# Patient Record
Sex: MALE | Race: WHITE | NOT HISPANIC OR LATINO | Employment: FULL TIME | URBAN - METROPOLITAN AREA
[De-identification: names, ages, dates, MRNs, and addresses within clinical notes are randomized per-mention and may not be internally consistent; named-entity substitution may affect disease eponyms.]

---

## 2018-04-19 ENCOUNTER — HOSPITAL ENCOUNTER (INPATIENT)
Facility: HOSPITAL | Age: 43
LOS: 1 days | Discharge: HOME | DRG: 305 | End: 2018-04-20
Attending: EMERGENCY MEDICINE | Admitting: INTERNAL MEDICINE
Payer: COMMERCIAL

## 2018-04-19 DIAGNOSIS — R80.9 PROTEINURIA, UNSPECIFIED TYPE: ICD-10-CM

## 2018-04-19 DIAGNOSIS — Z72.0 TOBACCO ABUSE: ICD-10-CM

## 2018-04-19 DIAGNOSIS — E87.6 HYPOKALEMIA: ICD-10-CM

## 2018-04-19 DIAGNOSIS — I16.1 HYPERTENSIVE EMERGENCY: Primary | ICD-10-CM

## 2018-04-19 DIAGNOSIS — I10 HYPERTENSION: ICD-10-CM

## 2018-04-19 DIAGNOSIS — N17.9 AKI (ACUTE KIDNEY INJURY): ICD-10-CM

## 2018-04-19 DIAGNOSIS — N28.1 BILATERAL RENAL CYSTS: ICD-10-CM

## 2018-04-19 DIAGNOSIS — R31.0 GROSS HEMATURIA: ICD-10-CM

## 2018-04-19 LAB
ABO + RH BLD: NORMAL
ALBUMIN SERPL BCP-MCNC: 3.9 G/DL
ALP SERPL-CCNC: 85 U/L
ALT SERPL W/O P-5'-P-CCNC: 27 U/L
AMPHET+METHAMPHET UR QL: NEGATIVE
ANION GAP SERPL CALC-SCNC: 12 MMOL/L
AST SERPL-CCNC: 20 U/L
BACTERIA #/AREA URNS HPF: NORMAL /HPF
BARBITURATES UR QL SCN>200 NG/ML: NEGATIVE
BASOPHILS # BLD AUTO: 0.09 K/UL
BASOPHILS NFR BLD: 0.6 %
BENZODIAZ UR QL SCN>200 NG/ML: NEGATIVE
BILIRUB SERPL-MCNC: 0.6 MG/DL
BILIRUB UR QL STRIP: NEGATIVE
BLD GP AB SCN CELLS X3 SERPL QL: NORMAL
BUN SERPL-MCNC: 23 MG/DL
BZE UR QL SCN: NEGATIVE
CALCIUM SERPL-MCNC: 9.7 MG/DL
CANNABINOIDS UR QL SCN: NEGATIVE
CHLORIDE SERPL-SCNC: 105 MMOL/L
CK SERPL-CCNC: 128 U/L
CLARITY UR: CLEAR
CO2 SERPL-SCNC: 24 MMOL/L
COLOR UR: YELLOW
CREAT SERPL-MCNC: 1.6 MG/DL
CREAT UR-MCNC: 158.1 MG/DL
CREAT UR-MCNC: 158.1 MG/DL
DIFFERENTIAL METHOD: ABNORMAL
EOSINOPHIL # BLD AUTO: 0.4 K/UL
EOSINOPHIL NFR BLD: 2.9 %
EOSINOPHIL URNS QL WRIGHT STN: NORMAL
ERYTHROCYTE [DISTWIDTH] IN BLOOD BY AUTOMATED COUNT: 13.9 %
EST. GFR  (AFRICAN AMERICAN): >60 ML/MIN/1.73 M^2
EST. GFR  (NON AFRICAN AMERICAN): 52 ML/MIN/1.73 M^2
ESTIMATED AVG GLUCOSE: 108 MG/DL
GLUCOSE SERPL-MCNC: 107 MG/DL
GLUCOSE UR QL STRIP: ABNORMAL
HBA1C MFR BLD HPLC: 5.4 %
HCT VFR BLD AUTO: 47.4 %
HGB BLD-MCNC: 16.3 G/DL
HGB UR QL STRIP: ABNORMAL
HYALINE CASTS #/AREA URNS LPF: 0 /LPF
INR PPP: 0.9
KETONES UR QL STRIP: NEGATIVE
LACTATE SERPL-SCNC: 1.8 MMOL/L
LEUKOCYTE ESTERASE UR QL STRIP: NEGATIVE
LYMPHOCYTES # BLD AUTO: 3.9 K/UL
LYMPHOCYTES NFR BLD: 26.7 %
MCH RBC QN AUTO: 30.5 PG
MCHC RBC AUTO-ENTMCNC: 34.4 G/DL
MCV RBC AUTO: 89 FL
METHADONE UR QL SCN>300 NG/ML: NEGATIVE
MICROALBUMIN UR DL<=1MG/L-MCNC: 1444 UG/ML
MICROALBUMIN/CREATININE RATIO: 913.3 UG/MG
MICROSCOPIC COMMENT: NORMAL
MONOCYTES # BLD AUTO: 1 K/UL
MONOCYTES NFR BLD: 6.7 %
NEUTROPHILS # BLD AUTO: 9 K/UL
NEUTROPHILS NFR BLD: 62.3 %
NITRITE UR QL STRIP: NEGATIVE
OPIATES UR QL SCN: NEGATIVE
PCP UR QL SCN>25 NG/ML: NEGATIVE
PH UR STRIP: 6 [PH] (ref 5–8)
PLATELET # BLD AUTO: 252 K/UL
PMV BLD AUTO: 12.1 FL
POTASSIUM SERPL-SCNC: 3.3 MMOL/L
PROT SERPL-MCNC: 7.6 G/DL
PROT UR QL STRIP: ABNORMAL
PROTHROMBIN TIME: 9.4 SEC
RBC # BLD AUTO: 5.34 M/UL
RBC #/AREA URNS HPF: 1 /HPF (ref 0–4)
SODIUM SERPL-SCNC: 141 MMOL/L
SODIUM UR-SCNC: 93 MMOL/L
SP GR UR STRIP: >=1.03 (ref 1–1.03)
SQUAMOUS #/AREA URNS HPF: 2 /HPF
TOXICOLOGY INFORMATION: NORMAL
TROPONIN I SERPL DL<=0.01 NG/ML-MCNC: 0.03 NG/ML
TROPONIN I SERPL DL<=0.01 NG/ML-MCNC: 0.04 NG/ML
TSH SERPL DL<=0.005 MIU/L-ACNC: 1.77 UIU/ML
URN SPEC COLLECT METH UR: ABNORMAL
UROBILINOGEN UR STRIP-ACNC: NEGATIVE EU/DL
WBC # BLD AUTO: 14.4 K/UL
WBC #/AREA URNS HPF: 3 /HPF (ref 0–5)

## 2018-04-19 PROCEDURE — 25000003 PHARM REV CODE 250: Performed by: INTERNAL MEDICINE

## 2018-04-19 PROCEDURE — 84484 ASSAY OF TROPONIN QUANT: CPT

## 2018-04-19 PROCEDURE — 87205 SMEAR GRAM STAIN: CPT

## 2018-04-19 PROCEDURE — 85025 COMPLETE CBC W/AUTO DIFF WBC: CPT

## 2018-04-19 PROCEDURE — 36415 COLL VENOUS BLD VENIPUNCTURE: CPT

## 2018-04-19 PROCEDURE — 82550 ASSAY OF CK (CPK): CPT

## 2018-04-19 PROCEDURE — 84484 ASSAY OF TROPONIN QUANT: CPT | Mod: 91

## 2018-04-19 PROCEDURE — 84300 ASSAY OF URINE SODIUM: CPT

## 2018-04-19 PROCEDURE — 83036 HEMOGLOBIN GLYCOSYLATED A1C: CPT

## 2018-04-19 PROCEDURE — 80053 COMPREHEN METABOLIC PANEL: CPT

## 2018-04-19 PROCEDURE — 84443 ASSAY THYROID STIM HORMONE: CPT

## 2018-04-19 PROCEDURE — 82043 UR ALBUMIN QUANTITATIVE: CPT

## 2018-04-19 PROCEDURE — 86901 BLOOD TYPING SEROLOGIC RH(D): CPT

## 2018-04-19 PROCEDURE — 25000003 PHARM REV CODE 250: Performed by: EMERGENCY MEDICINE

## 2018-04-19 PROCEDURE — 96366 THER/PROPH/DIAG IV INF ADDON: CPT

## 2018-04-19 PROCEDURE — 85610 PROTHROMBIN TIME: CPT

## 2018-04-19 PROCEDURE — 96361 HYDRATE IV INFUSION ADD-ON: CPT

## 2018-04-19 PROCEDURE — 81000 URINALYSIS NONAUTO W/SCOPE: CPT

## 2018-04-19 PROCEDURE — 80307 DRUG TEST PRSMV CHEM ANLYZR: CPT

## 2018-04-19 PROCEDURE — 93005 ELECTROCARDIOGRAM TRACING: CPT

## 2018-04-19 PROCEDURE — 20000000 HC ICU ROOM

## 2018-04-19 PROCEDURE — 99285 EMERGENCY DEPT VISIT HI MDM: CPT | Mod: 25

## 2018-04-19 PROCEDURE — 96365 THER/PROPH/DIAG IV INF INIT: CPT

## 2018-04-19 PROCEDURE — 93010 ELECTROCARDIOGRAM REPORT: CPT | Mod: ,,, | Performed by: INTERNAL MEDICINE

## 2018-04-19 PROCEDURE — 83605 ASSAY OF LACTIC ACID: CPT

## 2018-04-19 RX ORDER — SODIUM CHLORIDE 9 MG/ML
INJECTION, SOLUTION INTRAVENOUS CONTINUOUS
Status: ACTIVE | OUTPATIENT
Start: 2018-04-19 | End: 2018-04-19

## 2018-04-19 RX ORDER — METOPROLOL TARTRATE 50 MG/1
50 TABLET ORAL 2 TIMES DAILY
Status: DISCONTINUED | OUTPATIENT
Start: 2018-04-19 | End: 2018-04-20 | Stop reason: HOSPADM

## 2018-04-19 RX ORDER — POTASSIUM CHLORIDE 20 MEQ/1
20 TABLET, EXTENDED RELEASE ORAL ONCE
Status: COMPLETED | OUTPATIENT
Start: 2018-04-19 | End: 2018-04-19

## 2018-04-19 RX ORDER — ASPIRIN 325 MG
325 TABLET, DELAYED RELEASE (ENTERIC COATED) ORAL
Status: COMPLETED | OUTPATIENT
Start: 2018-04-19 | End: 2018-04-19

## 2018-04-19 RX ORDER — METOPROLOL TARTRATE 1 MG/ML
5 INJECTION, SOLUTION INTRAVENOUS EVERY 5 MIN PRN
Status: DISCONTINUED | OUTPATIENT
Start: 2018-04-19 | End: 2018-04-20 | Stop reason: HOSPADM

## 2018-04-19 RX ORDER — NICARDIPINE HYDROCHLORIDE 0.2 MG/ML
2.5 INJECTION INTRAVENOUS CONTINUOUS
Status: DISCONTINUED | OUTPATIENT
Start: 2018-04-19 | End: 2018-04-20

## 2018-04-19 RX ORDER — SODIUM CHLORIDE 0.9 % (FLUSH) 0.9 %
5 SYRINGE (ML) INJECTION
Status: DISCONTINUED | OUTPATIENT
Start: 2018-04-19 | End: 2018-04-20 | Stop reason: HOSPADM

## 2018-04-19 RX ADMIN — METOPROLOL TARTRATE 50 MG: 50 TABLET ORAL at 12:04

## 2018-04-19 RX ADMIN — ASPIRIN 325 MG: 325 TABLET, DELAYED RELEASE ORAL at 12:04

## 2018-04-19 RX ADMIN — SODIUM CHLORIDE: 0.9 INJECTION, SOLUTION INTRAVENOUS at 02:04

## 2018-04-19 RX ADMIN — METOPROLOL TARTRATE 50 MG: 50 TABLET ORAL at 08:04

## 2018-04-19 RX ADMIN — NICARDIPINE HYDROCHLORIDE 2.5 MG/HR: 0.2 INJECTION, SOLUTION INTRAVENOUS at 10:04

## 2018-04-19 RX ADMIN — SODIUM CHLORIDE, SODIUM LACTATE, POTASSIUM CHLORIDE, AND CALCIUM CHLORIDE 1000 ML: 600; 310; 30; 20 INJECTION, SOLUTION INTRAVENOUS at 10:04

## 2018-04-19 RX ADMIN — POTASSIUM CHLORIDE 20 MEQ: 20 TABLET, EXTENDED RELEASE ORAL at 12:04

## 2018-04-19 RX ADMIN — NICARDIPINE HYDROCHLORIDE 2.5 MG/HR: 0.2 INJECTION, SOLUTION INTRAVENOUS at 08:04

## 2018-04-19 NOTE — ED PROVIDER NOTES
Encounter Date: 4/19/2018    SCRIBE #1 NOTE: I, Melvin Hendrickson, am scribing for, and in the presence of,  Dr. Hua. I have scribed the entire note.       History     Chief Complaint   Patient presents with    Hematuria     c/o hematuria and pain across his lower back      Time seen by provider: 10:21 AM    This is a 42 y.o. male who presents with complaint of intermittent hematuria with associated lower back pain for the past 2 weeks. Patient is hypertensive at triage with a BP of 233/155. He reports that the pain does not radiate anywhere, but states it is worse on the right. He reports burning in his pelvic region, but not in his penis, during urination.  Patient also reports intermittent mild nausea, and frequency of urination. Patient denies any vomiting, fever, chills, rash, penile discomfort, penile discharge, HA, flushing, or any other concerning symptoms. Patient reports no improvement of symptoms with Azithromycin and cranberry juice. Patient has no history of kidney stone, STI's, or FHx of kidney abnormalities.      The history is provided by the patient. The history is limited by a language barrier. A  was used.     Review of patient's allergies indicates:  No Known Allergies  History reviewed. No pertinent past medical history.  History reviewed. No pertinent surgical history.  No family history on file.  Social History   Substance Use Topics    Smoking status: Current Every Day Smoker    Smokeless tobacco: Not on file    Alcohol use No     Review of Systems   Constitutional: Negative for chills and fever.   Gastrointestinal: Positive for nausea. Negative for abdominal pain and vomiting.   Genitourinary: Positive for dysuria, frequency and hematuria. Negative for discharge and penile pain.        Pelvic pain on urination   Musculoskeletal: Positive for back pain (bilateral, worse on right, nonradiating).   Skin: Negative for color change (No flushing) and rash.   Neurological:  Negative for headaches.   All other systems reviewed and are negative.      Physical Exam     Initial Vitals [04/19/18 0944]   BP Pulse Resp Temp SpO2   (!) 233/155 102 18 98 °F (36.7 °C) 97 %      MAP       181         Physical Exam    Nursing note and vitals reviewed.  Constitutional: He appears well-developed and well-nourished. He is not diaphoretic. No distress.   HENT:   Head: Normocephalic and atraumatic.   Mouth/Throat: Oropharynx is clear and moist.   Eyes: Conjunctivae and EOM are normal.   Neck: Normal range of motion. Neck supple.   Cardiovascular: Normal rate, regular rhythm and normal heart sounds. Exam reveals no gallop and no friction rub.    No murmur heard.  Pulmonary/Chest: Breath sounds normal. He has no wheezes. He has no rhonchi. He has no rales.   Abdominal: Soft. There is no tenderness.   No CVA TTP, no abdominal TTP   Genitourinary: Penis normal. No discharge found.   Genitourinary Comments: No penile discharge, no rash; circumcised   Musculoskeletal: Normal range of motion. He exhibits no edema or tenderness.   Lymphadenopathy:     He has no cervical adenopathy.   Neurological: He is alert and oriented to person, place, and time. He has normal strength.   Skin: Skin is warm and dry. No rash noted.         ED Course   Procedures  Labs Reviewed   URINALYSIS   URINALYSIS MICROSCOPIC     EKG Readings: (Independently Interpreted)   Sinus tachycardia at 113 bpm, QT interval normal, no ST elevation, RBBB          Medical Decision Making:   History:   I obtained history from: someone other than patient.       <> Summary of History: Patient's future brother-in-law  Clinical Tests:   Lab Tests: Ordered and Reviewed  Medical Tests: Ordered and Reviewed  ED Management:  11:44 AM  Paged [facility] hospitalist.    11:47 AM  Spoke with Dr. Carlos, [facility] hospitalist, who will assume care of patient. Dr. Carlos will wane patient off of Cardine drip so that patient can be admitted to floor. No further ED  imaging; Dr. Carlos will probably obtain US.    Additional MDM:   Comments: 42-year-old male with no past medical history presenting with extreme hypertension and a history of lower back pain intermittently over the past 2 weeks or so as well as intermittent hematuria.  Currently he has no pain and no complaints.  Does not endorse a history of flushing or headaches.  We will assess for end organ damage and lower his blood pressure with a titratable medication.  Patient had a small bump in troponin his tox screen is pending case discussed with Dr. Carlos he was given an aspirin prothrombin troponin.  He is to hydrated with a urine specific gravity of 10:30 and this could be contributing as a  prerenal  Insult however it is possible this could be due to his hypertension.  He needs a reassessment of his chemistry and kidney function after the fluids..                    Clinical Impression:   No diagnosis found.          Scribe attestation  I reviewed note and agree                 Anamaria Hua MD  04/19/18 3605       Anamaria Hua MD  04/22/18 8978

## 2018-04-19 NOTE — H&P
[facility] Internal Medicine History and Physical - Resident Note    Admitting Team: Medicine Team A  Attending Physician: Terrance  Resident: Vern  Interns: Gianna    Date of Admit: 4/19/2018    Chief Complaint     Hematuria and back pain x 2 weeks    Subjective:      History of Present Illness:  Mark Rivera is a 42 y.o. male who  has no past medical history on file.. The patient presented to [facility] on 4/19/2018 with a primary complaint of Hematuria (c/o hematuria and pain across his lower back )    Patient was USOH until 2 weeks ago when he began to notice blood in urine and intermittent low back pain. Pain described on right side lower back comes and goes throughout the day and will last for about 2 hours when it comes on. No radiation of the pain. Patient brought picture of bloody urine and urine is copious and turning entire toilet bowl red. Has been drinkingcranberry juice and the pain has improved but still persistent. Came to ER today because he had the day off and is concerned about kidney stones. Patient denies any headaches, dizziness, fever, lightheadedness, chest pain, SOB, changes in hearing or vision. Not currently sexually active. No discharge or rashes on penis.    Past Medical History:  History reviewed. No pertinent past medical history.    Past Surgical History:  History reviewed. No pertinent surgical history.    Allergies:  Review of patient's allergies indicates:  No Known Allergies    Home Medications:  Prior to Admission medications    Not on File       Family History:  M diabetes  F COPD    Social History:  Social History   Substance Use Topics    Smoking status: Current Every Day Smoker    Smokeless tobacco: Not on file    Alcohol use No   1 ppd smoker x 30 years. No alcohol/drugs. Works as [employer]    Review of Systems:  Pertinent positives and negatives are listed in HPI.  All other systems are reviewed and are negative.    Health Maintaince :   Primary Care  Physician: none  Immunizations:   Immunizations UTD     Objective:   Last 24 Hour Vital Signs:  BP  Min: 176/122  Max: 233/155  Temp  Av °F (36.7 °C)  Min: 98 °F (36.7 °C)  Max: 98 °F (36.7 °C)  Pulse  Av  Min: 98  Max: 118  Resp  Av.2  Min: 15  Max: 22  SpO2  Av.2 %  Min: 93 %  Max: 97 %  Height  Av' (182.9 cm)  Min: 6' (182.9 cm)  Max: 6' (182.9 cm)  Weight  Av.9 kg (174 lb)  Min: 78.9 kg (174 lb)  Max: 78.9 kg (174 lb)  Body mass index is 23.6 kg/m².  No intake/output data recorded.    Physical Examination:  General: Alert and awake in NAD  Head:  Normocephalic and atraumatic  Eyes:  PERRL; EOMi with anicteric sclera and clear conjunctivae  Mouth:  Oropharynx clear and without exudate; moist mucous membranes  Cardio:  Regular rate and rhythm with normal S1 and S2; no murmurs or rubs  Resp:  CTAB and unlabored; no wheezes, crackles or rhonchi  Back:  No CVA tenderness  Abdom: Soft, NTND with normoactive bowel sounds  Extrem: WWP with no clubbing, cyanosis or edema  Skin:  No rashes, lesions, or color changes  Pulses: 2+ and symmetric distally  Neuro:  AAOx3; cooperative and pleasant with no focal deficits    Laboratory:  Most Recent Data:  CBC: Lab Results   Component Value Date    WBC 14.40 (H) 2018    HGB 16.3 2018    HCT 47.4 2018     2018    MCV 89 2018    RDW 13.9 2018     BMP: Lab Results   Component Value Date     2018    K 3.3 (L) 2018     2018    CO2 24 2018    BUN 23 (H) 2018    CREATININE 1.6 (H) 2018     2018    CALCIUM 9.7 2018     LFTs: Lab Results   Component Value Date    PROT 7.6 2018    ALBUMIN 3.9 2018    BILITOT 0.6 2018    AST 20 2018    ALKPHOS 85 2018    ALT 27 2018     Coags:   Lab Results   Component Value Date    INR 0.9 2018     Urinalysis: Lab Results   Component Value Date    COLORU Yellow 2018     SPECGRAV >=1.030 (A) 04/19/2018    NITRITE Negative 04/19/2018    KETONESU Negative 04/19/2018    UROBILINOGEN Negative 04/19/2018    WBCUA 3 04/19/2018       Trended Lab Data:    Recent Labs  Lab 04/19/18  1043   WBC 14.40*   HGB 16.3   HCT 47.4      MCV 89   RDW 13.9      K 3.3*      CO2 24   BUN 23*   CREATININE 1.6*      PROT 7.6   ALBUMIN 3.9   BILITOT 0.6   AST 20   ALKPHOS 85   ALT 27       Trended Cardiac Data:    Recent Labs  Lab 04/19/18  1043   TROPONINI 0.038*     Radiology:  Imaging Results    None            Assessment:     HTN emergency  - initial /155. Creatinine elevated at 1.6  - likely longstanding essential HTN with no PCP  - started on nicardipine drip in ER  - starting lopressor 50, PRN IV metoprolol    Hematuria  - reported hematuria x2 weeks  - UA with 1+ occult blood. WBC 14.4  - urine labs pending. US and CT stone protocol pending  - lactate pending    DULCE  - Cr 1.6, BUN 23  - likely 2/2 stone vs HTN  - urine studies pending and imaging pending  - 1L LR given in ER, will add 1 more liter    Tobacco abuse  - 30 pack-year smoker  - will offer resources at [place]    Hypokalemia  - K 3.3  - monitor and replete PRN    HCM  - No PCP  - UTD vaccinations  - TSH A1c pending    Diet - cardiac  Ppx - OMAR hose  Dispo - pending workup    Code Status:     full    Simon Katz  [facility] Internal Medicine HO-I  [employer] Medicine Service    [facility] Medicine Hospitalist Pager numbers:   [facility] Hospitalist Medicine Team A (Jacqui/Terrance): [phone]  [facility] Hospitalist Medicine Team B (Brynn/Dyana):  [phone]

## 2018-04-19 NOTE — ED NOTES
Pt is alert and oriented x 3. Denies any pain or s/s of hypertension. No distress noted. On cardiac monitor. Friend at bedside. SR up and CB in reach.

## 2018-04-19 NOTE — NURSING
Pt arrived in stable condition. Walked from stretcher to ICU bed without difficulty. Attached monitor. Normal sinus rhythm noted, pt on Room air. No difficulty breathing. AAOx4, bilateral BS clear, active Bowel sounds, abd soft/nontender. Denies pain at this time. Pulses palpable in all extremities. Urinal provided. No complaints or questions at this time. Will continue to monitor.

## 2018-04-19 NOTE — ED NOTES
Pt transferred to ICU on cardiac monitor via stretcher and this nurse at side. Pt is alert and oriented, no distress noted. Pt watching videos on phone. ICU nurses met this nurse at bedside for transfer.

## 2018-04-19 NOTE — ED NOTES
APPEARANCE: Alert, oriented and in no acute distress.  CARDIAC: Tachycardic rate and rhythm, no murmur heard. Elevated BP  PERIPHERAL VASCULAR: peripheral pulses present. Normal cap refill. No edema. Warm to touch.    RESPIRATORY:Normal rate and effort, breath sounds clear bilaterally throughout chest. Respirations are equal and unlabored no obvious signs of distress.  GASTRO: soft, bowel sounds normal, no tenderness, no abdominal distention.  MUSC: Full ROM. No bony tenderness or soft tissue tenderness. No obvious deformity. Complains of back pain  SKIN: Skin is warm and dry, normal skin turgor, mucous membranes moist.  NEURO: 5/5 strength major flexors/extensors bilaterally. Sensory intact to light touch bilaterally. Antonia coma scale: eyes open spontaneously-4, oriented & converses-5, obeys commands-6. No neurological abnormalities.   MENTAL STATUS: awake, alert and aware of environment.  EYE: PERRL, both eyes: pupils brisk and reactive to light. Normal size.  ENT: EARS: no obvious drainage. NOSE: no active bleeding.   Pt complains of lower back pain and hematuria. BP is elevated with no hx or s/s of hypertension.

## 2018-04-19 NOTE — ED NOTES
42 year old male presented to ed complaint of back pain and hematuria ,dysuria for 2 weeks , no worzse todaty just had time to come to hospital today. Iv started and blood drawn , transfer to central monitoring / of bp 228/166 .

## 2018-04-19 NOTE — ED NOTES
Pt resting on stretcher, watching videos on his cell phone. Friend is at bedside. On cardiac monitor. No distress noted.

## 2018-04-20 VITALS
TEMPERATURE: 98 F | RESPIRATION RATE: 17 BRPM | SYSTOLIC BLOOD PRESSURE: 152 MMHG | OXYGEN SATURATION: 97 % | HEIGHT: 67 IN | HEART RATE: 74 BPM | WEIGHT: 177.5 LBS | BODY MASS INDEX: 27.86 KG/M2 | DIASTOLIC BLOOD PRESSURE: 103 MMHG

## 2018-04-20 LAB
ALBUMIN SERPL BCP-MCNC: 3.5 G/DL
ALP SERPL-CCNC: 75 U/L
ALT SERPL W/O P-5'-P-CCNC: 24 U/L
ANION GAP SERPL CALC-SCNC: 10 MMOL/L
AST SERPL-CCNC: 19 U/L
BASOPHILS # BLD AUTO: 0.08 K/UL
BASOPHILS NFR BLD: 0.5 %
BILIRUB SERPL-MCNC: 0.6 MG/DL
BUN SERPL-MCNC: 21 MG/DL
CALCIUM SERPL-MCNC: 9.1 MG/DL
CHLORIDE SERPL-SCNC: 105 MMOL/L
CO2 SERPL-SCNC: 24 MMOL/L
CREAT SERPL-MCNC: 1.4 MG/DL
CREAT UR-MCNC: 163.2 MG/DL
DIFFERENTIAL METHOD: ABNORMAL
EOSINOPHIL # BLD AUTO: 0.4 K/UL
EOSINOPHIL NFR BLD: 2.5 %
ERYTHROCYTE [DISTWIDTH] IN BLOOD BY AUTOMATED COUNT: 14 %
EST. GFR  (AFRICAN AMERICAN): >60 ML/MIN/1.73 M^2
EST. GFR  (NON AFRICAN AMERICAN): >60 ML/MIN/1.73 M^2
GLUCOSE SERPL-MCNC: 87 MG/DL
HCT VFR BLD AUTO: 45.3 %
HGB BLD-MCNC: 15.2 G/DL
LYMPHOCYTES # BLD AUTO: 5.2 K/UL
LYMPHOCYTES NFR BLD: 32.1 %
MAGNESIUM SERPL-MCNC: 2 MG/DL
MCH RBC QN AUTO: 29.9 PG
MCHC RBC AUTO-ENTMCNC: 33.6 G/DL
MCV RBC AUTO: 89 FL
MONOCYTES # BLD AUTO: 1 K/UL
MONOCYTES NFR BLD: 6.2 %
NEUTROPHILS # BLD AUTO: 9.3 K/UL
NEUTROPHILS NFR BLD: 57.5 %
PHOSPHATE SERPL-MCNC: 3.3 MG/DL
PLATELET # BLD AUTO: 266 K/UL
PMV BLD AUTO: 12.1 FL
POTASSIUM SERPL-SCNC: 3.2 MMOL/L
PROT SERPL-MCNC: 6.8 G/DL
RBC # BLD AUTO: 5.09 M/UL
SODIUM SERPL-SCNC: 139 MMOL/L
TROPONIN I SERPL DL<=0.01 NG/ML-MCNC: 0.03 NG/ML
WBC # BLD AUTO: 16.1 K/UL

## 2018-04-20 PROCEDURE — 90471 IMMUNIZATION ADMIN: CPT | Performed by: RADIOLOGY

## 2018-04-20 PROCEDURE — 85025 COMPLETE CBC W/AUTO DIFF WBC: CPT

## 2018-04-20 PROCEDURE — 90670 PCV13 VACCINE IM: CPT | Performed by: RADIOLOGY

## 2018-04-20 PROCEDURE — 3E0234Z INTRODUCTION OF SERUM, TOXOID AND VACCINE INTO MUSCLE, PERCUTANEOUS APPROACH: ICD-10-PCS | Performed by: INTERNAL MEDICINE

## 2018-04-20 PROCEDURE — 63600175 PHARM REV CODE 636 W HCPCS: Performed by: RADIOLOGY

## 2018-04-20 PROCEDURE — 84100 ASSAY OF PHOSPHORUS: CPT

## 2018-04-20 PROCEDURE — 36415 COLL VENOUS BLD VENIPUNCTURE: CPT

## 2018-04-20 PROCEDURE — 83735 ASSAY OF MAGNESIUM: CPT

## 2018-04-20 PROCEDURE — 80053 COMPREHEN METABOLIC PANEL: CPT

## 2018-04-20 PROCEDURE — 82570 ASSAY OF URINE CREATININE: CPT

## 2018-04-20 PROCEDURE — 25000003 PHARM REV CODE 250: Performed by: INTERNAL MEDICINE

## 2018-04-20 PROCEDURE — 94761 N-INVAS EAR/PLS OXIMETRY MLT: CPT

## 2018-04-20 PROCEDURE — 63600175 PHARM REV CODE 636 W HCPCS: Performed by: INTERNAL MEDICINE

## 2018-04-20 RX ORDER — LISINOPRIL 20 MG/1
20 TABLET ORAL DAILY
Qty: 90 TABLET | Refills: 3 | Status: SHIPPED | OUTPATIENT
Start: 2018-04-20 | End: 2018-04-20

## 2018-04-20 RX ORDER — LISINOPRIL 20 MG/1
40 TABLET ORAL DAILY
Qty: 180 TABLET | Refills: 3 | Status: SHIPPED | OUTPATIENT
Start: 2018-04-20 | End: 2018-07-18 | Stop reason: SDUPTHER

## 2018-04-20 RX ORDER — POTASSIUM CHLORIDE 20 MEQ/1
20 TABLET, EXTENDED RELEASE ORAL
Status: COMPLETED | OUTPATIENT
Start: 2018-04-20 | End: 2018-04-20

## 2018-04-20 RX ORDER — MAGNESIUM SULFATE 1 G/100ML
1 INJECTION INTRAVENOUS ONCE
Status: COMPLETED | OUTPATIENT
Start: 2018-04-20 | End: 2018-04-20

## 2018-04-20 RX ADMIN — MAGNESIUM SULFATE 1 G: 1 INJECTION INTRAVENOUS at 09:04

## 2018-04-20 RX ADMIN — POTASSIUM CHLORIDE 20 MEQ: 20 TABLET, EXTENDED RELEASE ORAL at 11:04

## 2018-04-20 RX ADMIN — METOPROLOL TARTRATE 50 MG: 50 TABLET ORAL at 07:04

## 2018-04-20 RX ADMIN — PNEUMOCOCCAL 13-VALENT CONJUGATE VACCINE 0.5 ML: 2.2; 2.2; 2.2; 2.2; 2.2; 4.4; 2.2; 2.2; 2.2; 2.2; 2.2; 2.2; 2.2 INJECTION, SUSPENSION INTRAMUSCULAR at 11:04

## 2018-04-20 RX ADMIN — POTASSIUM CHLORIDE 20 MEQ: 20 TABLET, EXTENDED RELEASE ORAL at 09:04

## 2018-04-20 NOTE — DISCHARGE SUMMARY
[facility] Hospitalist Medicine Discharge Summary    Primary Team: [facility] Medicine Team A  Attending Physician: Marge Carlos  Resident: Trevor Porras  Intern: Simon Katz    Date of Admit: 4/19/2018  Date of Discharge:     Discharge to: home  Condition: stable    Discharge Diagnoses     Patient Active Problem List   Diagnosis    Hypertensive emergency    DULCE (acute kidney injury)    Gross hematuria    Tobacco abuse    Hypokalemia       Consultants and Procedures     Consultants:  --    Procedures:   --    Brief History of Present Illness      Mark Rivera is a 42 y.o. male who  has no past medical history on file.. The patient presented to [facility] on 4/19/2018 with a primary complaint of Hematuria (c/o hematuria and pain across his lower back )     Patient was USOH until 2 weeks ago when he began to notice blood in urine and intermittent low back pain. Pain described on right side lower back comes and goes throughout the day and will last for about 2 hours when it comes on. No radiation of the pain. Patient brought picture of bloody urine and urine is copious and turning entire toilet bowl red. Has been drinkingcranberry juice and the pain has improved but still persistent. Came to ER today because he had the day off and is concerned about kidney stones. Patient denies any headaches, dizziness, fever, lightheadedness, chest pain, SOB, changes in hearing or vision. Not currently sexually active. No discharge or rashes on penis.    Hospital Course By Problem with Pertinent Findings     HTN emergency  - initial /155 on arrival. Creatinine elevated at 1.6  - likely longstanding essential HTN with no PCP  - started on nicardipine drip in ER. Weaned off  - started lopressor 50, continue. PRN IV metoprolol  - No SIRIA on US with doppler  - will go home with lisinopril 40 and f/u PCP     Hematuria  - reported hematuria x2 weeks  - UA with 1+ occult blood. WBC 14.4  - urine labs pending. US as  above, CT stone protocol without evidence of stone  - lactate wnl    DULCE  - Cr 1.6, BUN 23 on arrival. Cr 1.4 with normal GFR this AM.  - likely 2/2 stone vs HTN  - Fena pending, microalbumin/creatinine ratio 900  - 1L LR given in ER, additional liter of NS given on floor.     Tobacco abuse  - 30 pack-year smoker  - will offer resources at [place]     Hypokalemia  - Repleting as needed     HCM  - No PCP  - UTD vaccinations  - TSH 1.7, A1c 5.4     Discharge Medications        Medication List      START taking these medications    lisinopril 40 MG tablet  Commonly known as:  PRINIVIL,ZESTRIL  Take 1 tablet (40 mg total) by mouth once daily.           Where to Get Your Medications      You can get these medications from any pharmacy    Bring a paper prescription for each of these medications  · lisinopril 20 MG tablet         Discharge Information:   Diet:  cardiac    Physical Activity:  As tolerated    Instructions:  1. Take all medications as prescribed  2. Keep all follow-up appointments  3. Return to the hospital or call your primary care physicians if any worsening symptoms such as fever, vomiting, chest pain, shortness of breath, weakness, lightheadedness, fainting occur.      Follow-Up Appointments:  PCP 1-2 weeks    Simon Katz  [facility] Internal Medicine, -I

## 2018-04-20 NOTE — PROGRESS NOTES
Dr. Katz notified of pt bp, md and team aware ok to discharge pt with bp as below      04/20/18 1100   Vital Signs   Pulse 74   Resp 17   SpO2 97 %   BP (!) 152/103   MAP (mmHg) 123

## 2018-04-20 NOTE — PLAN OF CARE
Problem: Patient Care Overview  Goal: Plan of Care Review  Outcome: Ongoing (interventions implemented as appropriate)  Plan of care reviewed with pt in pt preferred language Yakut, via Language Line, pt educated on blood pressure, risk of high blood pressure, medications, side effects, fall risk prevention measures, and plan of care, pt verbalizes understanding. Denies any needs and denies any questions at this time. Care is ongoing will continue to monitor pt status.

## 2018-04-20 NOTE — PLAN OF CARE
Problem: Patient Care Overview  Goal: Plan of Care Review  Pt refuses language line for information in Hungarian, pt brother in law at bedside who is a pharmacist is pt preferred way of communication. Education given in the form of handouts and verbally educated pt. Pt given education on lisinopril and the side effects, hypertension, how to take your blood pressure, f/u, risk factors, and tobacco cessation. Pt states he has already gone 2 days without smoking and does not plan to start up. Pt denies any needs or questions. Brother in law states he will help pt with bp monitoring and medication at home. nad noted. Pt ambulated off unit denies any symptoms.

## 2018-04-20 NOTE — PROGRESS NOTES
Patient resting at present appears to be in no distress. He remain with his Cardene drip infusing. POC is ongoing.

## 2018-04-20 NOTE — PLAN OF CARE
Chief Complaint      Hematuria and back pain x 2 weeks    Pt and brother in law, Rosado Joselito, bedside for assessment, pt independent with ADLs, no HH/HME, lives with adult brother, friend, [place] can provide transportation on discharge    Pt speaks broken english, [place] offered ,  Pt declined, friend translated       04/20/18 1056   Discharge Assessment   Assessment Type Discharge Planning Assessment   Confirmed/corrected address and phone number on facesheet? Yes   Assessment information obtained from? Patient  (pt and brother in law Rosado Vo [phone])   Expected Length of Stay (days) 2   Communicated expected length of stay with patient/caregiver yes   Prior to hospitilization cognitive status: Alert/Oriented   Prior to hospitalization functional status: Independent   Current cognitive status: Alert/Oriented   Current Functional Status: Independent   Facility Arrived From: (home)   Lives With sibling(s)  (brother)   Able to Return to Prior Arrangements yes   Is patient able to care for self after discharge? Yes   Who are your caregiver(s) and their phone number(s)? brother in law: [facility] [phone]   Patient's perception of discharge disposition home or selfcare   Readmission Within The Last 30 Days no previous admission in last 30 days   Patient currently being followed by outpatient case management? No   Patient currently receives any other outside agency services? No   Equipment Currently Used at Home none   Do you have any problems affording any of your prescribed medications? No   Is the patient taking medications as prescribed? yes   Does the patient have transportation home? Yes   Transportation Available family or friend will provide   Dialysis Name and Scheduled days N/A   Does the patient receive services at the [facility]? No   Discharge Plan A Home   Discharge Plan B Home with family   Patient/Family In Agreement With Plan yes     Pura Hudson RN Transitional Navigator  [phone]

## 2018-04-20 NOTE — PROGRESS NOTES
"[facility] Internal Medicine Resident HO-III Progress Note    Subjective:      Afeb, VSS overnight. BP well controlled. Patient without complaints this AM. Denies headache.     Objective:   Last 24 Hour Vital Signs:  BP  Min: 130/86  Max: 233/155  Temp  Av.2 °F (36.8 °C)  Min: 98 °F (36.7 °C)  Max: 98.6 °F (37 °C)  Pulse  Av.3  Min: 69  Max: 118  Resp  Av.9  Min: 11  Max: 28  SpO2  Av.7 %  Min: 89 %  Max: 98 %  Height  Av' 9.5" (176.5 cm)  Min: 5' 7" (170.2 cm)  Max: 6' (182.9 cm)  Weight  Av.7 kg (175 lb 11.8 oz)  Min: 78.9 kg (174 lb)  Max: 80.5 kg (177 lb 7.5 oz)  I/O last 3 completed shifts:  In: -   Out: 2100 [Urine:2100]    Physical Examination:    General:          Alert and awake in NAD  Head:               Normocephalic and atraumatic  Eyes:               PERRL; EOMi with anicteric sclera and clear conjunctivae  Mouth:             Oropharynx clear and without exudate; moist mucous membranes  Cardio:             Regular rate and rhythm with normal S1 and S2; no murmurs or rubs  Resp:               CTAB and unlabored; no wheezes, crackles or rhonchi  Back:               No CVA tenderness  Abdom:            Soft, NTND with normoactive bowel sounds  Extrem:            WWP with no clubbing, cyanosis or edema  Skin:                No rashes, lesions, or color changes  Pulses:            2+ and symmetric distally  Neuro:             AAOx3; cooperative and pleasant with no focal deficits    Laboratory:    Laboratory Data Reviewed: yes  Pertinent Findings:      Recent Labs  Lab 18  1043 18  0335   WBC 14.40* 16.10*   HGB 16.3 15.2   HCT 47.4 45.3    266   MCV 89 89   INR 0.9  --           Recent Labs  Lab 18  1043 18  0335    139   K 3.3* 3.2*    105   CO2 24 24   BUN 23* 21*   CREATININE 1.6* 1.4   AST 20 19   ALT 27 24   ALBUMIN 3.9 3.5        Microbiology Data Reviewed: yes  Pertinent Findings:  Microbiology Results (last 7 days)     ** No results " found for the last 168 hours. **          Other Results:  EKG (my interpretation): None    Radiology Data Reviewed: yes  Pertinent Findings:  Imaging Results          US Renal Artery Stenosis Hyperten (xpd) (Final result)  Result time 04/19/18 14:19:24    Final result by Apple Brown MD (04/19/18 14:19:24)                 Impression:      There is no evidence renal artery stenosis.      Electronically signed by: Apple Brown MD  Date:    04/19/2018  Time:    14:19             Narrative:    EXAMINATION:  US RENAL ARTERY STENOSIS HYPERTEN (XPD)    CLINICAL HISTORY:  Hypertension/DULCE;    TECHNIQUE:  Sonographic evaluation of the kidneys was performed.    COMPARISON:  None    FINDINGS:  The right kidney measures 12.1 cm in length.  There is no hydronephrosis.  There is a 1.5 cm cyst seen within the superior pole.  Resistive indices within a cortical artery are 0.4.  The highest peak systolic velocity along the main renal artery is 65 centimeters/second.    The left kidney measures 10 cm in length with resistive index within the parenchymal artery of 0.5.  The highest velocity along the left main renal artery is 50 centimeters/second.  Cysts are seen within the left kidney.    The aortic peak systolic velocity is 50 centimeters/second.                               CT Renal Stone Study Abd Pelvis WO (Final result)  Result time 04/19/18 12:53:51    Final result by Niecy Keita MD (04/19/18 12:53:51)                 Impression:      No evidence of the forming or obstructive renal stone.      Electronically signed by: Niecy Keita MD  Date:    04/19/2018  Time:    12:53             Narrative:    EXAMINATION:  CT RENAL STONE STUDY ABD PELVIS WO    CLINICAL HISTORY:  Flank pain, stone disease suspected;    TECHNIQUE:  Low dose axial images, sagittal and coronal reformations were obtained from the lung bases to the pubic symphysis.  Contrast was not administered.    COMPARISON:  None    FINDINGS:  The  lung bases are clear.  No liver mass.  The gallbladder is present.  The stomach, pancreas, spleen, adrenal glands and bilateral kidneys appear normal.  There is no forming renal Stones.  No stone seen within the bilateral ureters or within the bladder.  The aorta tapers normally, no para-aortic lymphadenopathy.  Small amount of retained stool, no inflammatory changes of the bowel seen.  The appendix is normal.  The bladder, prostate and seminal vesicles appear within normal limits.  Small left fat containing inguinal hernia.  The osseous structures appear normal.                                Current Medications:   Infusions:          Scheduled:   metoprolol tartrate  50 mg Oral BID         PRN:  influenza, metoprolol, sodium chloride 0.9%    Antibiotics and Day Number of Therapy:  Antibiotics     None          Lines and Day Number of Therapy:    Assessment:     Mark Rivera is a 42 y.o.male with  Patient Active Problem List    Diagnosis Date Noted    Hypertensive emergency 04/19/2018    DULCE (acute kidney injury) 04/19/2018    Gross hematuria 04/19/2018    Tobacco abuse 04/19/2018    Hypokalemia 04/19/2018        Plan:     HTN emergency  - initial /155 on arrival. Creatinine elevated at 1.6  - likely longstanding essential HTN with no PCP  - started on nicardipine drip in ER. Weaned off  - started lopressor 50, continue. PRN IV metoprolol  - No SIRIA on US with doppler     Hematuria  - reported hematuria x2 weeks  - UA with 1+ occult blood. WBC 14.4  - urine labs pending. US as above, CT stone protocol without evidence of stone  - lactate wnl    DULCE  - Cr 1.6, BUN 23 on arrival. Cr 1.4 with normal GFR this AM.  - likely 2/2 stone vs HTN  - Fena pending, microalbumin/creatinine ratio 900  - 1L LR given in ER, additional liter of NS given on floor.     Tobacco abuse  - 30 pack-year smoker  - will offer resources at [place]     Hypokalemia  - Repleting as needed     HCM  - No PCP  - UTD vaccinations  - TSH 1.7, A1c  5.4      Diet - cardiac  Ppx - OMAR hose  Dispo - Likely discharge today    Trevor Porras MD  [facility] Internal Medicine HO-III  [facility] Internal Medicine Service Team A    [facility] Medicine Hospitalist Pager numbers:   [facility] Hospitalist Medicine Team A (Jacqui/Terrance): [phone]  [facility] Hospitalist Medicine Team B (Brynn/Dyana):  [phone]

## 2018-04-20 NOTE — PROGRESS NOTES
Patient have 2 visitors here to bring him some food and inquire as to when the doctor will be in tomorrow. Patient brother-in-law phone number was placed into [employer]. He request the doctor call him, secondary to language barrier that the patient have currently.

## 2018-04-20 NOTE — PLAN OF CARE
Discharge orders noted, no HH or HME ordered.    Pt's nurse will go over medications/signs and symptoms prior to discharge    TN met with pt bedside, no discharge needs at this time       04/20/18 1114   Final Note   Assessment Type Final Discharge Note   Discharge Disposition Home   What phone number can be called within the next 1-3 days to see how you are doing after discharge? [phone]   Hospital Follow Up  Appt(s) scheduled? No  (pt to schedule own f/u appt)   Right Care Referral Info   Post Acute Recommendation No Care     Pura Hudson, RN Transitional Navigator  [phone]

## 2018-04-20 NOTE — PROGRESS NOTES
Patient family bought him some fruits and he is finished eating it, for now. I offered to place it in the fridge with his patient label on it, he agreed.

## 2018-04-20 NOTE — PROGRESS NOTES
Bedside report given to oncoming nurse. PIV flushed again with saline, patent. IV site switched to right hand. Patient asked if he would be willing to stay, if the doctors do not order him d/c. He did not give a clear answer, he warrant further assessment. Patient was educated on the severity of high blood pressure, he just moaned, not using clear words. Ongoing care.

## 2018-04-20 NOTE — DISCHARGE INSTRUCTIONS
BLOOD PRESSURE, DISCHARGE INSTRUCTIONS: TAKING YOUR (ENGLISH)   Smoke Free, Benefits of Living (English)   Smoking Withdrawal, Coping with (English)  Smoking Cessation (English)   Heart-Healthy Food, Eating: Using the DASH Plan (English)  High Blood Pressure (Hypertension), Discharge Instructions (English)   Hypertension, New (Begin Treatment) (English)   Lisinopril tablets (English)

## 2018-04-20 NOTE — PROGRESS NOTES
Report was received from off going nurse, patient lying in bed resting  With phone in his hand. Patient is AAOX4 he speak English with some barrier. Noted he is currently on a Cardene drip at rate control. POC discussed with patient verbalized understanding.

## 2018-04-22 PROBLEM — R80.9 PROTEINURIA: Status: ACTIVE | Noted: 2018-04-22

## 2018-04-23 ENCOUNTER — PATIENT OUTREACH (OUTPATIENT)
Dept: ADMINISTRATIVE | Facility: CLINIC | Age: 43
End: 2018-04-23

## 2018-04-23 NOTE — PATIENT INSTRUCTIONS
"  Discharge Instructions for High Blood Pressure (Hypertension)  You have been diagnosed with high blood pressure (also called hypertension). This means the force of blood against your artery walls is too strong. It also means your heart is working hard to move blood. High blood pressure usually has no symptoms, but over time, it can damage your heart, blood vessels, eyes, kidneys, and other organs. With help from your doctor, you can manage your blood pressure and protect your health.  Taking medicine  · Learn to take your own blood pressure. Keep a record of your results. Ask your doctor which readings mean that you need medical attention.  · Take your blood pressure medicine exactly as directed. Dont skip doses. Missing doses can cause your blood pressure to get out of control.  · If you do miss a dose (or doses) check with your healthcare provider about what to do.  · Avoid medicine that contain heart stimulants, including over-the-counter drugs. Check for warnings about high blood pressure on the label. Ask the pharmacist before purchasing something you haven't used before  · Check with your doctor or pharmacist before taking a decongestant. Some decongestants can worsen high blood pressure.  Lifestyle changes  · Maintain a healthy weight. Get help to lose any extra pounds.  · Cut back on salt.  ¨ Limit canned, dried, packaged, and fast foods.  ¨ Dont add salt to your food at the table.  ¨ Season foods with herbs instead of salt when you cook.  ¨ Request no added salt when you go to a restaurant.  ¨ The American Heart Associations (AHA) "ideal" sodium intake recommendation is 1,500 milligrams per day.  However, since American's eat so much salt, the AHA says a positive change can occur by cutting back to even 2,400 milligrams of sodium a day.   · Follow the DASH (Dietary Approaches to Stop Hypertension) eating plan. This plan recommends vegetables, fruits, whole gains, and other heart healthy foods.  · Begin " an exercise program. Ask your doctor how to get started. The American Heart Association recommends aerobic exercise 3 to 4 times a week for an average of 40 minutes at a time, with your doctor's approval. Simple activities like walking or gardening can help.  · Break the smoking habit. Enroll in a stop-smoking program to improve your chances of success. Ask your healthcare provider about programs and medicines to help you stop smoking.  · Limit drinks that contain caffeine (coffee, black or green tea, cola) to 2 per day.  · Never take stimulants such as amphetamines or cocaine; these drugs can be deadly for someone with high blood pressure.  · Control your stress. Learn stress-management techniques.  · Limit alcohol to no more than 1 drink a day for women and 2 drinks a day for men.  Follow-up care  Make a follow-up appointment as directed by our staff.     When to seek medical care  Call your doctor immediately or seek emergency care if you have any of the following:  · Chest pain or shortness of breath (call 911)  · Moderate to severe headache  · Weakness in the muscles of your face, arms, or legs  · Trouble speaking  · Extreme drowsiness  · Confusion  · Fainting or dizziness  · Pulsating or rushing sound in your ears  · Unexplained nosebleed  · Weakness, tingling, or numbness of your face, arms, or legs  · Change in vision  · Blood pressure measured at home that is greater than 180/110   Date Last Reviewed: 4/27/2016  © 5154-5824 [employer]. [address] [place] [address], [place] [address]. All rights reserved. This information is not intended as a substitute for professional medical care. Always follow your healthcare professional's instructions.

## 2018-07-18 ENCOUNTER — OFFICE VISIT (OUTPATIENT)
Dept: FAMILY MEDICINE | Facility: CLINIC | Age: 43
End: 2018-07-18
Payer: COMMERCIAL

## 2018-07-18 VITALS
TEMPERATURE: 98 F | SYSTOLIC BLOOD PRESSURE: 220 MMHG | WEIGHT: 193.56 LBS | RESPIRATION RATE: 16 BRPM | BODY MASS INDEX: 30.38 KG/M2 | HEART RATE: 94 BPM | HEIGHT: 67 IN | DIASTOLIC BLOOD PRESSURE: 160 MMHG | OXYGEN SATURATION: 95 %

## 2018-07-18 DIAGNOSIS — I16.0 HYPERTENSIVE URGENCY: Primary | ICD-10-CM

## 2018-07-18 DIAGNOSIS — R31.0 GROSS HEMATURIA: ICD-10-CM

## 2018-07-18 DIAGNOSIS — N17.9 AKI (ACUTE KIDNEY INJURY): ICD-10-CM

## 2018-07-18 PROBLEM — F17.211 CIGARETTE NICOTINE DEPENDENCE IN REMISSION: Status: ACTIVE | Noted: 2018-04-19

## 2018-07-18 PROBLEM — I16.1 HYPERTENSIVE EMERGENCY: Status: RESOLVED | Noted: 2018-04-19 | Resolved: 2018-07-18

## 2018-07-18 LAB
BACTERIA #/AREA URNS AUTO: NORMAL /HPF
BILIRUB UR QL STRIP: NEGATIVE
CLARITY UR REFRACT.AUTO: ABNORMAL
COLOR UR AUTO: YELLOW
CREAT UR-MCNC: 98 MG/DL
GLUCOSE UR QL STRIP: ABNORMAL
HGB UR QL STRIP: ABNORMAL
HYALINE CASTS UR QL AUTO: 0 /LPF
KETONES UR QL STRIP: NEGATIVE
LEUKOCYTE ESTERASE UR QL STRIP: NEGATIVE
MICROALBUMIN UR DL<=1MG/L-MCNC: 324 UG/ML
MICROALBUMIN/CREATININE RATIO: 330.6 UG/MG
MICROSCOPIC COMMENT: NORMAL
NITRITE UR QL STRIP: NEGATIVE
PH UR STRIP: 5 [PH] (ref 5–8)
PROT UR QL STRIP: ABNORMAL
RBC #/AREA URNS AUTO: 1 /HPF (ref 0–4)
SP GR UR STRIP: 1.01 (ref 1–1.03)
URATE CRY UR QL COMP ASSIST: NORMAL
URN SPEC COLLECT METH UR: ABNORMAL
UROBILINOGEN UR STRIP-ACNC: NEGATIVE EU/DL
WBC #/AREA URNS AUTO: 0 /HPF (ref 0–5)

## 2018-07-18 PROCEDURE — 3080F DIAST BP >= 90 MM HG: CPT | Mod: CPTII,S$GLB,, | Performed by: FAMILY MEDICINE

## 2018-07-18 PROCEDURE — 82043 UR ALBUMIN QUANTITATIVE: CPT

## 2018-07-18 PROCEDURE — 99205 OFFICE O/P NEW HI 60 MIN: CPT | Mod: S$GLB,,, | Performed by: FAMILY MEDICINE

## 2018-07-18 PROCEDURE — 3077F SYST BP >= 140 MM HG: CPT | Mod: CPTII,S$GLB,, | Performed by: FAMILY MEDICINE

## 2018-07-18 PROCEDURE — 93010 ELECTROCARDIOGRAM REPORT: CPT | Mod: S$GLB,,, | Performed by: INTERNAL MEDICINE

## 2018-07-18 PROCEDURE — 3008F BODY MASS INDEX DOCD: CPT | Mod: CPTII,S$GLB,, | Performed by: FAMILY MEDICINE

## 2018-07-18 PROCEDURE — 81001 URINALYSIS AUTO W/SCOPE: CPT

## 2018-07-18 PROCEDURE — 93005 ELECTROCARDIOGRAM TRACING: CPT | Mod: S$GLB,,, | Performed by: FAMILY MEDICINE

## 2018-07-18 PROCEDURE — 99999 PR PBB SHADOW E&M-EST. PATIENT-LVL III: CPT | Mod: PBBFAC,,, | Performed by: FAMILY MEDICINE

## 2018-07-18 RX ORDER — LISINOPRIL 40 MG/1
40 TABLET ORAL DAILY
Qty: 90 TABLET | Refills: 3 | Status: SHIPPED | OUTPATIENT
Start: 2018-07-18 | End: 2018-08-09

## 2018-07-18 RX ORDER — HYDROCHLOROTHIAZIDE 25 MG/1
25 TABLET ORAL DAILY
Qty: 90 TABLET | Refills: 3 | Status: SHIPPED | OUTPATIENT
Start: 2018-07-18 | End: 2018-07-19

## 2018-07-18 NOTE — PROGRESS NOTES
Subjective:       Patient ID: Mark Rivera is a 42 y.o. male.    Chief Complaint: Hospital Follow Up (4/19/18 htn est care )    HPI Interpretation by friend provided and preferred by patient.     Pt was treated for htn emergency and was discharged to follow up and establish with a pcp. Due to pts insurance, he had difficulty finding a covered provider per report.     Pt states that he is adherent with lisinopril.     Nicotine dependence - quit smoking x 3 months! Rare alcohol use. Rare nsaids use.        No outpatient prescriptions have been marked as taking for the 7/18/18 encounter (Office Visit) with Ronald Pearson MD.       Past Medical History:   Diagnosis Date    Hypertension        Family History   Problem Relation Age of Onset    Diabetes Mother     Hypertension Father         reports that he quit smoking about 3 months ago. He has a 25.00 pack-year smoking history. He has never used smokeless tobacco. He reports that he drinks alcohol. He reports that he does not use drugs.    Review of Systems   Constitutional: Negative for chills and fever.   HENT: Negative for ear pain and rhinorrhea.    Eyes: Negative for discharge and visual disturbance.   Respiratory: Negative for shortness of breath and wheezing.    Cardiovascular: Negative for chest pain and palpitations.   Gastrointestinal: Negative for diarrhea and vomiting.   Genitourinary: Negative for dysuria and hematuria.   Skin: Negative for rash and wound.   Neurological: Negative for syncope and weakness.   Psychiatric/Behavioral: Negative for dysphoric mood. The patient is not nervous/anxious.        Objective:     Vitals:    07/18/18 1012   BP: (!) 220/160   Pulse: 94   Resp: 16   Temp: 98.2 °F (36.8 °C)        Physical Exam   Constitutional: He appears well-developed. No distress.   HENT:   Head: Normocephalic and atraumatic.   Eyes: Conjunctivae and EOM are normal. No scleral icterus.   Cardiovascular: Normal rate and regular rhythm.  Exam  reveals no gallop and no friction rub.    No murmur heard.  Pulmonary/Chest: Effort normal and breath sounds normal. No respiratory distress. He has no wheezes. He has no rales. He exhibits no tenderness.   Musculoskeletal: He exhibits no edema.   No gross extremity deformity   Neurological: He is alert.   Psychiatric: He has a normal mood and affect. His behavior is normal.   Nursing note and vitals reviewed.      Assessment:       1. Hypertensive urgency    2. DULCE (acute kidney injury)    3. Gross hematuria        Plan:       Mark was seen today for hospital follow up.    Diagnoses and all orders for this visit:    Hypertensive urgency  -     hydroCHLOROthiazide (HYDRODIURIL) 25 MG tablet; Take 1 tablet (25 mg total) by mouth once daily.  -     lisinopril (PRINIVIL,ZESTRIL) 40 MG tablet; Take 1 tablet (40 mg total) by mouth once daily.  -     Comprehensive metabolic panel; Future  -     Microalbumin/creatinine urine ratio  -     Metanephrines, urine Random; Future  -     Cortisol, 8AM; Future  -     ANTIDIURETIC HORMONE; Future  This is a high risk patient encounter with a patient who was lost to follow up after his hospital admission to the ICU for malignant hypertension.     Pt is aware that if he develops ANY sxs such as headache, chest pain, dizziness, vomiting, or any other concerning sxs that he is to report to the ED immediately.     Plan we will increase patient's medications today from 20 mg twice a day to once a day in order to help with adherence because he was forgetting the evening dose and will have also hydrochlorothiazide 25 mg. Patient will return in 2 days for blood pressure check. Complicating matters, as the patient is leaving the country for [place] for 2 weeks and will not return after this weekend until August 8. He will follow with me the week he returns. Patient is aware that I advise the avoidance of high risk activities such as heavy alcohol intake, smoking, or NSAID use.    I will also  check again for primary causes of hypertension. I reviewed some of the records from the hospitalization. I will not reorder ultrasound for renal artery stenosis, status post done while he was admitted. I do feel that these other labs warrant a repeat to confirm that they are within a normal range.     I feel that it is in the patient's best interest to try outpatient management of his hypertension. He denies any symptoms whatsoever at this time. In order  To remain sensitive to his cost of care, we will attempt outpatient treatment. Pt agrees with this plan of action.     EKG as read by me is NSR @ ~75 bpm, nl axis, IRBBB, mild nonspecific st- t wave abnormalities, no acute MI.      DULCE (acute kidney injury)  Will check labs today to confirm recovery.     Gross hematuria  -     Urinalysis            Follow-up in about 1 week (around 7/25/2018) for Hypertension.        Pt verbalized understanding and agreed with our plan.

## 2018-07-19 ENCOUNTER — LAB VISIT (OUTPATIENT)
Dept: LAB | Facility: HOSPITAL | Age: 43
End: 2018-07-19
Attending: FAMILY MEDICINE
Payer: COMMERCIAL

## 2018-07-19 ENCOUNTER — TELEPHONE (OUTPATIENT)
Dept: FAMILY MEDICINE | Facility: CLINIC | Age: 43
End: 2018-07-19

## 2018-07-19 ENCOUNTER — HOSPITAL ENCOUNTER (EMERGENCY)
Facility: HOSPITAL | Age: 43
Discharge: HOME | End: 2018-07-19
Attending: EMERGENCY MEDICINE
Payer: COMMERCIAL

## 2018-07-19 VITALS
DIASTOLIC BLOOD PRESSURE: 128 MMHG | WEIGHT: 190 LBS | HEIGHT: 67 IN | SYSTOLIC BLOOD PRESSURE: 190 MMHG | OXYGEN SATURATION: 100 % | BODY MASS INDEX: 29.82 KG/M2 | HEART RATE: 92 BPM | RESPIRATION RATE: 18 BRPM | TEMPERATURE: 98 F

## 2018-07-19 DIAGNOSIS — I16.0 HYPERTENSIVE URGENCY: ICD-10-CM

## 2018-07-19 DIAGNOSIS — I10 ASYMPTOMATIC HYPERTENSION: Primary | ICD-10-CM

## 2018-07-19 LAB
ALBUMIN SERPL BCP-MCNC: 4.3 G/DL
ALP SERPL-CCNC: 67 U/L
ALT SERPL W/O P-5'-P-CCNC: 119 U/L
ANION GAP SERPL CALC-SCNC: 9 MMOL/L
AST SERPL-CCNC: 59 U/L
BILIRUB SERPL-MCNC: 1.5 MG/DL
BUN SERPL-MCNC: 18 MG/DL
CALCIUM SERPL-MCNC: 10 MG/DL
CHLORIDE SERPL-SCNC: 99 MMOL/L
CO2 SERPL-SCNC: 30 MMOL/L
CORTIS SERPL-MCNC: 16.7 UG/DL
CREAT SERPL-MCNC: 1.7 MG/DL
EST. GFR  (AFRICAN AMERICAN): 55.9 ML/MIN/1.73 M^2
EST. GFR  (NON AFRICAN AMERICAN): 48.3 ML/MIN/1.73 M^2
GLUCOSE SERPL-MCNC: 142 MG/DL
POTASSIUM SERPL-SCNC: 3.8 MMOL/L
PROT SERPL-MCNC: 7.9 G/DL
SODIUM SERPL-SCNC: 138 MMOL/L

## 2018-07-19 PROCEDURE — 25000003 PHARM REV CODE 250: Performed by: EMERGENCY MEDICINE

## 2018-07-19 PROCEDURE — 99283 EMERGENCY DEPT VISIT LOW MDM: CPT

## 2018-07-19 PROCEDURE — 84588 ASSAY OF VASOPRESSIN: CPT

## 2018-07-19 PROCEDURE — 83835 ASSAY OF METANEPHRINES: CPT

## 2018-07-19 PROCEDURE — 80053 COMPREHEN METABOLIC PANEL: CPT

## 2018-07-19 PROCEDURE — 82533 TOTAL CORTISOL: CPT

## 2018-07-19 PROCEDURE — 36415 COLL VENOUS BLD VENIPUNCTURE: CPT | Mod: PO

## 2018-07-19 RX ORDER — AMLODIPINE BESYLATE 10 MG/1
10 TABLET ORAL DAILY
Qty: 30 TABLET | Refills: 0 | Status: SHIPPED | OUTPATIENT
Start: 2018-07-19 | End: 2018-08-09

## 2018-07-19 RX ORDER — HYDROCHLOROTHIAZIDE 25 MG/1
25 TABLET ORAL 2 TIMES DAILY
Qty: 30 TABLET | Refills: 0 | Status: SHIPPED | OUTPATIENT
Start: 2018-07-19 | End: 2018-07-19

## 2018-07-19 RX ORDER — AMLODIPINE BESYLATE 5 MG/1
10 TABLET ORAL
Status: COMPLETED | OUTPATIENT
Start: 2018-07-19 | End: 2018-07-19

## 2018-07-19 RX ORDER — AMLODIPINE BESYLATE 5 MG/1
5 TABLET ORAL
Status: DISCONTINUED | OUTPATIENT
Start: 2018-07-19 | End: 2018-07-19

## 2018-07-19 RX ORDER — HYDROCHLOROTHIAZIDE 25 MG/1
25 TABLET ORAL 2 TIMES DAILY
Qty: 30 TABLET | Refills: 0 | Status: SHIPPED | OUTPATIENT
Start: 2018-07-19 | End: 2018-08-09

## 2018-07-19 RX ORDER — HYDROCHLOROTHIAZIDE 25 MG/1
25 TABLET ORAL
Status: COMPLETED | OUTPATIENT
Start: 2018-07-19 | End: 2018-07-19

## 2018-07-19 RX ORDER — AMLODIPINE BESYLATE 10 MG/1
10 TABLET ORAL DAILY
Qty: 30 TABLET | Refills: 0 | Status: SHIPPED | OUTPATIENT
Start: 2018-07-19 | End: 2018-07-19

## 2018-07-19 RX ORDER — HYDROCHLOROTHIAZIDE 25 MG/1
50 TABLET ORAL
Status: DISCONTINUED | OUTPATIENT
Start: 2018-07-19 | End: 2018-07-19

## 2018-07-19 RX ADMIN — AMLODIPINE BESYLATE 10 MG: 5 TABLET ORAL at 07:07

## 2018-07-19 RX ADMIN — HYDROCHLOROTHIAZIDE 25 MG: 25 TABLET ORAL at 08:07

## 2018-07-19 NOTE — TELEPHONE ENCOUNTER
PCP would like to call patient to follow up on recent BP taken at home. Pt came to clinic this morning for labs. Spoke to patient stated will call back with BP level. Pt did took medication this morning. Denied of any sx. PCP notified.

## 2018-07-19 NOTE — TELEPHONE ENCOUNTER
----- Message from Ronald Pearson MD sent at 7/19/2018  3:53 PM CDT -----  Also, inform patient that his liver function is not normal either. Please ask him to avoid any alcohol intake until we can establish the cause of this! His kidneys are stressed as mentioned before, please make sure that he is drinking plenty of water.

## 2018-07-19 NOTE — TELEPHONE ENCOUNTER
----- Message from Ronald Pearson MD sent at 7/19/2018  3:50 PM CDT -----  Please notify patient results are abnormal and show sings of kidney damage. Nothing emergent needs to be done, but we need to get his BP down ASAP.  Please have the patient follow up with me when he gets back from [place].  Thanks.

## 2018-07-19 NOTE — TELEPHONE ENCOUNTER
Patient stated recent BP was 204/141. Notified pt due to recent abnormal labs of kidney and liver it is best for patient to report to ED due to recent elevated blood pressure per Dr Pearson. Note can go to any ER with [facility] in case facility need to see pt records and labs.  Pt stated not able to go to emergency room today due to having to go to work tomorrow and will not have anyone to work in place for him if he go to ER. Notified patient again it is best for patient to go to ER before going out of country. Strongly notified pt he may not be able to work anymore or go out of country if patient do not take care of himself if he do not report to ER. Notified patient to call work facility prior to going to ER and note again of precaution. Pt verbalized understanding. Pt stated ok regarding request.

## 2018-07-20 ENCOUNTER — CLINICAL SUPPORT (OUTPATIENT)
Dept: FAMILY MEDICINE | Facility: CLINIC | Age: 43
End: 2018-07-20
Payer: COMMERCIAL

## 2018-07-20 VITALS — SYSTOLIC BLOOD PRESSURE: 154 MMHG | HEART RATE: 93 BPM | DIASTOLIC BLOOD PRESSURE: 120 MMHG

## 2018-07-20 DIAGNOSIS — I10 HYPERTENSION, UNSPECIFIED TYPE: Primary | ICD-10-CM

## 2018-07-20 PROCEDURE — 99499 UNLISTED E&M SERVICE: CPT | Mod: S$GLB,,, | Performed by: FAMILY MEDICINE

## 2018-07-20 PROCEDURE — 99999 PR PBB SHADOW E&M-EST. PATIENT-LVL I: CPT | Mod: PBBFAC,,,

## 2018-07-20 NOTE — ED NOTES
To ER with c/o high blood pressure.  Pt placed on medication yesterday for same c/o.  Denies headache, blurred vision, chest pain or SOB.  Awake and alert, oriented x 4.  resp even and unlabored.  Lungs clear.  abd soft and non tender.  + BS noted.  No peripheral edema noted.  Dr. Duron at the bedside.

## 2018-07-20 NOTE — ED NOTES
Pt continues to deny headache, vision changes, chest pain or SOB.  Discharge instructions given and explained.  Prescriptions and medications discussed.  Follow up care discussed.

## 2018-07-20 NOTE — PROGRESS NOTES
Karen  Mark Rivera 42 y.o. male is here today for Blood Pressure check.   History of HTN yes.    Review of patient's allergies indicates:  No Known Allergies  Creatinine   Date Value Ref Range Status   07/19/2018 1.7 (H) 0.5 - 1.4 mg/dL Final     Sodium   Date Value Ref Range Status   07/19/2018 138 136 - 145 mmol/L Final     Potassium   Date Value Ref Range Status   07/19/2018 3.8 3.5 - 5.1 mmol/L Final   ]  Patient verifies taking blood pressure medications on a regular basis at the same time of the day.     Current Outpatient Prescriptions:     amLODIPine (NORVASC) 10 MG tablet, Take 1 tablet (10 mg total) by mouth once daily., Disp: 30 tablet, Rfl: 0    hydroCHLOROthiazide (HYDRODIURIL) 25 MG tablet, Take 1 tablet (25 mg total) by mouth 2 (two) times daily., Disp: 30 tablet, Rfl: 0    lisinopril (PRINIVIL,ZESTRIL) 40 MG tablet, Take 1 tablet (40 mg total) by mouth once daily., Disp: 90 tablet, Rfl: 3  No current facility-administered medications for this visit.   Does patient have record of home blood pressure readings no. Readings have been averaging N/A.   Last dose of blood pressure medication was taken at 7/20/2018.  Patient is asymptomatic.   Complains of nothing at this time.    BP: (!) 152/122 ,   .    Blood pressure reading after 15 minutes was 154/120, Pulse 93.  Dr. Pearson notified.   Patient advised per  to continue medication regimen with instructions to take lisinopril and hydrochlorothiazide in the morning and amlodipine at night. Patient verbalized understanding. Follow up appointment scheduled at last visit.

## 2018-07-20 NOTE — ED PROVIDER NOTES
Encounter Date: 7/19/2018    SCRIBE #1 NOTE: I, Yonas Maria, am scribing for, and in the presence of, Dr. Duron.       History     Chief Complaint   Patient presents with    Hypertension     c/o went to MD--elevated BP. [facility]. asymptomatic.lisinopril dose doubled yesterday and hydrochlorothiazide was started new yesterday.     Mark Rivera is a 42 y.o. male who  has a past medical history of Hypertension.    Patient presents to the ED c/o elevated BP.  He was just seen by PCP yesterday and had medications changed.  He is on lisinopril 40 mg daily, HCTZ 25 daily.  Reports that he is compliant.  This morning measured in 200's, later 170's. HE is completely asymptomatic.  Denies HA, CP, SOB, N/V, vision changes. Labs performed yesterday and reveal Friend acted as  from [place].            Review of patient's allergies indicates:  No Known Allergies  Past Medical History:   Diagnosis Date    Hypertension      No past surgical history on file.  Family History   Problem Relation Age of Onset    Diabetes Mother     Hypertension Father      Social History   Substance Use Topics    Smoking status: Former Smoker     Packs/day: 1.00     Years: 25.00     Quit date: 4/18/2018    Smokeless tobacco: Never Used    Alcohol use Yes      Comment: socially     Review of Systems   Constitutional: Negative for fever.   HENT: Negative for sore throat.    Respiratory: Negative for shortness of breath.    Cardiovascular: Negative for chest pain.        Asymptomatic HTN   Gastrointestinal: Negative for nausea.   Genitourinary: Negative for dysuria.   Musculoskeletal: Negative for back pain.   Skin: Negative for rash.   Neurological: Negative for weakness.   Hematological: Does not bruise/bleed easily.       Physical Exam     Initial Vitals [07/19/18 1759]   BP Pulse Resp Temp SpO2   (!) 195/127 106 18 98.4 °F (36.9 °C) 95 %      MAP       --         Physical Exam    Nursing note and vitals  reviewed.  Constitutional: He appears well-developed and well-nourished. He is not diaphoretic. No distress.   HENT:   Head: Normocephalic and atraumatic.   Right Ear: External ear normal.   Left Ear: External ear normal.   Nose: Nose normal.   Eyes: Conjunctivae and EOM are normal. Right eye exhibits no discharge. Left eye exhibits no discharge. No scleral icterus.   Neck: Normal range of motion. Neck supple.   Cardiovascular: Normal rate, regular rhythm, normal heart sounds and intact distal pulses. Exam reveals no gallop and no friction rub.    No murmur heard.  Pulmonary/Chest: Breath sounds normal. No respiratory distress. He has no wheezes. He has no rhonchi. He has no rales.   Abdominal: Soft. He exhibits no distension. There is no tenderness. There is no rebound.   Musculoskeletal: Normal range of motion.   Neurological: He is alert and oriented to person, place, and time.   Skin: Skin is warm and dry. Capillary refill takes less than 2 seconds.   Psychiatric: He has a normal mood and affect.         ED Course   Procedures  Labs Reviewed - No data to display       Imaging Results    None          Medical Decision Making:   Initial Assessment:   Patient here with asymptomatic HTN  Differential Diagnosis:   HTN emergency, HTN urgency, asymptomatic HTN  ED Management:  After complete ED evaluation, clinical impression is most consistent with asymptomatic HTN.  His HCTZ was increased to BID and norvasc 10 mg added.  Recommended that patient continue to monitor BP and return if not more controlled in 24 hrs  At this time, I feel there is no emergent condition requiring further evaluation or admission. I believe the patient is stable for discharge from the ED. The patient and any additional family present were updated with test results, overall clinical impression, and recommended further plan of care. All questions were answered. The patient expressed understanding and agreed with current plan for discharge with  PCP follow-up within 2 days for BP recheck. Strict return precautions were provided, including HA, N/V, blurry vision, CP/SOB, return/worsening of current symptoms or any other concerns.                      ED Course as of Jul 22 0725   Thu Jul 19, 2018 1944 Patient asymptomatic.  Norvasc 10 mg, HCTZ 25 mg BP: (!) 195/127 [LD]      ED Course User Index  [LD] Savannah Duron MD     Clinical Impression:   The encounter diagnosis was Asymptomatic hypertension.      Disposition:   Disposition: Discharged  Condition: Stable     I, Savannah Duron,  personally performed the services described in this documentation. All medical record entries made by the scribe were at my direction and in my presence.  I have reviewed the chart and agree that the record reflects my personal performance and is accurate and complete. Savannah Duron M.D. 7:25 AM07/22/2018                   Savannah Duron MD  07/22/18 0725

## 2018-07-23 LAB
COLLECT DURATION TIME SPEC: NORMAL HR
CREAT 24H UR-MRATE: NORMAL MG/D (ref 1000–2500)
CREAT UR-MCNC: 129 MG/DL
METANEPH 24H UR-MCNC: 121 UG/L
METANEPH 24H UR-MRATE: NORMAL UG/D (ref 62–207)
METANEPH+NORMETANEPH UR-IMP: NORMAL
METANEPH/CREAT 24H UR: 94 UG/G CRT (ref 0–300)
NORMETANEPHRINE 24H UR-MCNC: 359 UG/L
NORMETANEPHRINE 24H UR-MRATE: NORMAL UG/D (ref 125–510)
NORMETANEPHRINE/CREAT 24H UR: 278 UG/G CRT (ref 0–400)
SPECIMEN VOL ?TM UR: NORMAL ML

## 2018-07-26 LAB — VASOPRESSIN SERPL-MCNC: <0.5 PG/ML (ref 0–6.9)

## 2018-08-09 ENCOUNTER — OFFICE VISIT (OUTPATIENT)
Dept: FAMILY MEDICINE | Facility: CLINIC | Age: 43
End: 2018-08-09
Payer: COMMERCIAL

## 2018-08-09 ENCOUNTER — LAB VISIT (OUTPATIENT)
Dept: LAB | Facility: HOSPITAL | Age: 43
End: 2018-08-09
Attending: FAMILY MEDICINE
Payer: COMMERCIAL

## 2018-08-09 VITALS
DIASTOLIC BLOOD PRESSURE: 80 MMHG | HEART RATE: 96 BPM | SYSTOLIC BLOOD PRESSURE: 130 MMHG | WEIGHT: 195.13 LBS | OXYGEN SATURATION: 96 % | HEIGHT: 67 IN | RESPIRATION RATE: 16 BRPM | TEMPERATURE: 99 F | BODY MASS INDEX: 30.63 KG/M2

## 2018-08-09 DIAGNOSIS — M54.6 ACUTE LEFT-SIDED THORACIC BACK PAIN: ICD-10-CM

## 2018-08-09 DIAGNOSIS — N17.9 AKI (ACUTE KIDNEY INJURY): ICD-10-CM

## 2018-08-09 DIAGNOSIS — Z13.6 ENCOUNTER FOR LIPID SCREENING FOR CARDIOVASCULAR DISEASE: ICD-10-CM

## 2018-08-09 DIAGNOSIS — Z13.220 ENCOUNTER FOR LIPID SCREENING FOR CARDIOVASCULAR DISEASE: ICD-10-CM

## 2018-08-09 DIAGNOSIS — M54.6 ACUTE LEFT-SIDED THORACIC BACK PAIN: Primary | ICD-10-CM

## 2018-08-09 DIAGNOSIS — I16.0 HYPERTENSIVE URGENCY: ICD-10-CM

## 2018-08-09 LAB
ALBUMIN SERPL BCP-MCNC: 3.8 G/DL
ALP SERPL-CCNC: 69 U/L
ALT SERPL W/O P-5'-P-CCNC: 107 U/L
ANION GAP SERPL CALC-SCNC: 10 MMOL/L
AST SERPL-CCNC: 47 U/L
BILIRUB SERPL-MCNC: 0.7 MG/DL
BUN SERPL-MCNC: 26 MG/DL
CALCIUM SERPL-MCNC: 9.9 MG/DL
CHLORIDE SERPL-SCNC: 102 MMOL/L
CHOLEST SERPL-MCNC: 245 MG/DL
CHOLEST/HDLC SERPL: 6.6 {RATIO}
CO2 SERPL-SCNC: 26 MMOL/L
CREAT SERPL-MCNC: 1.6 MG/DL
EST. GFR  (AFRICAN AMERICAN): >60 ML/MIN/1.73 M^2
EST. GFR  (NON AFRICAN AMERICAN): 52 ML/MIN/1.73 M^2
GLUCOSE SERPL-MCNC: 103 MG/DL
HDLC SERPL-MCNC: 37 MG/DL
HDLC SERPL: 15.1 %
LDLC SERPL CALC-MCNC: ABNORMAL MG/DL
NONHDLC SERPL-MCNC: 208 MG/DL
POTASSIUM SERPL-SCNC: 3.7 MMOL/L
PROT SERPL-MCNC: 7.3 G/DL
SODIUM SERPL-SCNC: 138 MMOL/L
TRIGL SERPL-MCNC: 871 MG/DL

## 2018-08-09 PROCEDURE — 3079F DIAST BP 80-89 MM HG: CPT | Mod: CPTII,S$GLB,, | Performed by: FAMILY MEDICINE

## 2018-08-09 PROCEDURE — 80061 LIPID PANEL: CPT

## 2018-08-09 PROCEDURE — 99999 PR PBB SHADOW E&M-EST. PATIENT-LVL III: CPT | Mod: PBBFAC,,, | Performed by: FAMILY MEDICINE

## 2018-08-09 PROCEDURE — 3008F BODY MASS INDEX DOCD: CPT | Mod: CPTII,S$GLB,, | Performed by: FAMILY MEDICINE

## 2018-08-09 PROCEDURE — 99214 OFFICE O/P EST MOD 30 MIN: CPT | Mod: S$GLB,,, | Performed by: FAMILY MEDICINE

## 2018-08-09 PROCEDURE — 36415 COLL VENOUS BLD VENIPUNCTURE: CPT | Mod: PO

## 2018-08-09 PROCEDURE — 80053 COMPREHEN METABOLIC PANEL: CPT

## 2018-08-09 PROCEDURE — 3075F SYST BP GE 130 - 139MM HG: CPT | Mod: CPTII,S$GLB,, | Performed by: FAMILY MEDICINE

## 2018-08-09 RX ORDER — HYDROCHLOROTHIAZIDE 25 MG/1
25 TABLET ORAL 2 TIMES DAILY
Qty: 90 TABLET | Refills: 3 | Status: CANCELLED | OUTPATIENT
Start: 2018-08-09 | End: 2019-08-09

## 2018-08-09 RX ORDER — TIZANIDINE 4 MG/1
4 TABLET ORAL NIGHTLY PRN
Qty: 25 TABLET | Refills: 0 | Status: SHIPPED | OUTPATIENT
Start: 2018-08-09 | End: 2019-05-22 | Stop reason: SDUPTHER

## 2018-08-09 RX ORDER — AMLODIPINE AND BENAZEPRIL HYDROCHLORIDE 10; 40 MG/1; MG/1
1 CAPSULE ORAL DAILY
Qty: 90 CAPSULE | Refills: 3 | Status: SHIPPED | OUTPATIENT
Start: 2018-08-09 | End: 2019-05-22 | Stop reason: SDUPTHER

## 2018-08-09 RX ORDER — HYDROCHLOROTHIAZIDE 25 MG/1
25 TABLET ORAL 2 TIMES DAILY
Qty: 90 TABLET | Refills: 3 | Status: SHIPPED | OUTPATIENT
Start: 2018-08-09 | End: 2018-12-12

## 2018-08-09 RX ORDER — AMLODIPINE BESYLATE 10 MG/1
10 TABLET ORAL DAILY
Qty: 90 TABLET | Refills: 3 | Status: CANCELLED | OUTPATIENT
Start: 2018-08-09 | End: 2019-08-09

## 2018-08-10 ENCOUNTER — TELEPHONE (OUTPATIENT)
Dept: FAMILY MEDICINE | Facility: CLINIC | Age: 43
End: 2018-08-10

## 2018-08-10 NOTE — TELEPHONE ENCOUNTER
Notified patient cousin regarding information below. Pt scheduled for 9/20/18 would you like patient to come in earlier?

## 2018-08-10 NOTE — TELEPHONE ENCOUNTER
----- Message from Ronald Pearson MD sent at 8/10/2018  8:40 AM CDT -----  Please notify patient results are abnormal as his kidney function has improved, but shows signs of permanent damage and his triglycerides are very high. Nothing emergent needs to be done. Please have the patient follow up with me in 1-2 weeks to discuss if not already scheduled in this time frame. Thanks.

## 2018-08-10 NOTE — PROGRESS NOTES
Subjective:       Patient ID: Mark Rivera is a 42 y.o. male.    Chief Complaint: Hypertension (follow up )    HPI Hx partially through friend who interprets from [place].     htn - chronic - pt has been adherent with his medications without side effect, and has been recording his blood pressures at home, all of which have been belwo 140/90 over the last 10 days or so! He states that he feels much better now that his blood pressure is controlled with more energy and better sleep.       Alcohol dependence - pt has dramatically cut back on his alcohol intake and is doing very well. He has a hard time quantifying exactly how much he is drinking now, but it is a dramatic decrease.     DULCE - pt has been adherent with bp meds, and has no urinary complaints. Due for recheck.     Pt also complains of Int L sided upper back pain s/p going on a flight from [place] that is worse with certain movements, and mild -mod in severity.     Outpatient Prescriptions Marked as Taking for the 8/9/18 encounter (Office Visit) with Ronald Pearson MD   Medication Sig Dispense Refill    hydroCHLOROthiazide (HYDRODIURIL) 25 MG tablet Take 1 tablet (25 mg total) by mouth 2 (two) times daily. 90 tablet 3    [DISCONTINUED] amLODIPine (NORVASC) 10 MG tablet Take 1 tablet (10 mg total) by mouth once daily. 30 tablet 0    [DISCONTINUED] hydroCHLOROthiazide (HYDRODIURIL) 25 MG tablet Take 1 tablet (25 mg total) by mouth 2 (two) times daily. 30 tablet 0    [DISCONTINUED] lisinopril (PRINIVIL,ZESTRIL) 40 MG tablet Take 1 tablet (40 mg total) by mouth once daily. 90 tablet 3       Past Medical History:   Diagnosis Date    Hypertension        Family History   Problem Relation Age of Onset    Diabetes Mother     Hypertension Father         reports that he quit smoking about 3 months ago. He has a 25.00 pack-year smoking history. He has never used smokeless tobacco. He reports that he drinks alcohol. He reports that he does not use  drugs.    Review of Systems    Objective:     Vitals:    08/09/18 1137   BP: 130/80   Pulse:    Resp:    Temp:         Physical Exam    Assessment:       1. Rhomboid Strain Acute   2. Hypertensive urgency    3. Acute left-sided thoracic back pain    4. DULCE (acute kidney injury)    5. Encounter for lipid screening for cardiovascular disease        Plan:       Mark was seen today for hypertension.    Diagnoses and all orders for this visit:    Rhomboid Strain  -     tiZANidine (ZANAFLEX) 4 MG tablet; Take 1 tablet (4 mg total) by mouth nightly as needed. Do not take with alcohol  - Pts hx and pex suggest muscle strain/spasm of rhomboid, L, muscle. Pt advised to take NSAIDs and magnesium to help relieve sxs, and to perform PT exercises given to them two times a day. Pt to return if sxs have not improved in 2 weeks.     Hypertensive urgency  -     amlodipine-benazepril (LOTREL) 10-40 mg per capsule; Take 1 capsule by mouth once daily.  -     hydroCHLOROthiazide (HYDRODIURIL) 25 MG tablet; Take 1 tablet (25 mg total) by mouth 2 (two) times daily.  Improved -  Pts blood pressure looks perfect at home. Will combine medications to increase ease of use and prob of adherence.      Acute left-sided thoracic back pain  -     tiZANidine (ZANAFLEX) 4 MG tablet; Take 1 tablet (4 mg total) by mouth nightly as needed. Do not take with alcohol    DULCE (acute kidney injury)  -     Comprehensive metabolic panel; Future  Asxs. Will recheck today. Acute vs chronic Ki?    Encounter for lipid screening for cardiovascular disease  -     Lipid panel; Future              Follow-up in about 6 weeks (around 9/20/2018) for Hypertension.        Pt verbalized understanding and agreed with our plan.

## 2018-08-30 ENCOUNTER — OFFICE VISIT (OUTPATIENT)
Dept: FAMILY MEDICINE | Facility: CLINIC | Age: 43
End: 2018-08-30
Payer: COMMERCIAL

## 2018-08-30 VITALS
BODY MASS INDEX: 29.2 KG/M2 | OXYGEN SATURATION: 96 % | HEIGHT: 67 IN | HEART RATE: 60 BPM | WEIGHT: 186.06 LBS | RESPIRATION RATE: 20 BRPM | SYSTOLIC BLOOD PRESSURE: 132 MMHG | DIASTOLIC BLOOD PRESSURE: 84 MMHG | TEMPERATURE: 98 F

## 2018-08-30 DIAGNOSIS — R80.9 PROTEINURIA, UNSPECIFIED TYPE: ICD-10-CM

## 2018-08-30 DIAGNOSIS — E78.00 HYPERCHOLESTEROLEMIA: ICD-10-CM

## 2018-08-30 DIAGNOSIS — I10 HYPERTENSION, WELL CONTROLLED: ICD-10-CM

## 2018-08-30 DIAGNOSIS — E78.1 HYPERTRIGLYCERIDEMIA: ICD-10-CM

## 2018-08-30 DIAGNOSIS — N18.30 STAGE 3 CHRONIC KIDNEY DISEASE: Primary | ICD-10-CM

## 2018-08-30 PROBLEM — N17.9 AKI (ACUTE KIDNEY INJURY): Status: RESOLVED | Noted: 2018-04-19 | Resolved: 2018-08-30

## 2018-08-30 PROCEDURE — 99214 OFFICE O/P EST MOD 30 MIN: CPT | Mod: S$GLB,,, | Performed by: FAMILY MEDICINE

## 2018-08-30 PROCEDURE — 99999 PR PBB SHADOW E&M-EST. PATIENT-LVL III: CPT | Mod: PBBFAC,,, | Performed by: FAMILY MEDICINE

## 2018-08-30 PROCEDURE — 3008F BODY MASS INDEX DOCD: CPT | Mod: CPTII,S$GLB,, | Performed by: FAMILY MEDICINE

## 2018-08-30 PROCEDURE — 3079F DIAST BP 80-89 MM HG: CPT | Mod: CPTII,S$GLB,, | Performed by: FAMILY MEDICINE

## 2018-08-30 PROCEDURE — 3075F SYST BP GE 130 - 139MM HG: CPT | Mod: CPTII,S$GLB,, | Performed by: FAMILY MEDICINE

## 2018-08-30 RX ORDER — FENOFIBRATE 160 MG/1
160 TABLET ORAL DAILY
Qty: 90 TABLET | Refills: 3 | Status: SHIPPED | OUTPATIENT
Start: 2018-08-30 | End: 2019-10-31 | Stop reason: SDUPTHER

## 2018-08-30 NOTE — PATIENT INSTRUCTIONS
Chronic Kidney Disease (CKD)     The role of the kidneys is to remove waste products and extra water from the blood.  When the kidneys do not work as they should, waste products begin to build up in the blood. This is called chronic kidney disease (CKD). CKD means that you have kidney damage or a decrease in kidney function lasting at least 3 months. CKD allows extra water, waste, and toxins to build up in the body. This can eventually become life-threatening. You might need dialysis or a kidney transplant to stay alive. This most severe form is called end stage renal disease.  Diabetes is the leading causes of chronic renal failure. Other causes include high blood pressure, hardening of the arteries (atherosclerosis), lupus, inflammation of the blood vessels (vasculitis), and past viral or bacterial infections. Certain over-the-counter pain medicines can cause renal failure when taken often over a long period of time. These include aspirin, ibuprofen, and related anti-inflammatory medicines called NSAIDs (nonsteroidal anti-inflammatory drugs).  Home care  The following guidelines will help you care for yourself at home:  · If you have diabetes, talk with your healthcare provider about keeping your blood sugar under control. Ask if you need to make and changes to your diet, lifestyle, or medicines.  · If you have high blood pressure:  ¨ Take prescribed medicine to lower your blood pressure to the recommended goal of less than 130/80.  ¨ Start a regular exercise program that you enjoy. Check with your healthcare provider to be sure your planned exercise program is right for you.  ¨ Eat less salt (sodium). Your healthcare provider can tell you how much salt per day is safe for you.  · If you are overweight, talk with your healthcare provider about a weight loss plan.  · If you smoke, you must quit. Smoking makes kidney disease worse. Talk with your healthcare provider about ways to help you quit.  For more  information, visit the following links:  ¨ www.smokefree.gov/sites/default/files/pdf/clearing-the-air-accessible.pdf  ¨ www.smokefree.gov  ¨ www.cancer.org/healthy/stayawayfromtobacco/guidetoquittingsmoking/  · Most people with CKD need to follow a special diet.  Be sure you understand yours. In general, you will need to limit protein, salt, potassium, and phosphorus. You also need to limit how much fluid you drink.   · CKD is a risk factor for heart disease. Talk with your healthcare provider about any other risk factors you might have and what you can do to lessen them.  · Talk with your healthcare provider about any medicines you are taking to find out if they need to be reduced or stopped.  · Don't use the following over-the-counter medicines, or consult your healthcare provider before using:  ¨ Aspirin and NSAIDs such as ibuprofen or naproxen. Using acetaminophen for fever or pain is OK.  ¨ Laxatives and antacids containing magnesium or aluminum  ¨ Fleet or phospho soda enemas containing phosphorus  ¨ Certain stomach acid-blocking medicine such as cimetidine or ranitidine   ¨ Decongestants containing pseudoephedrine   ¨ Herbal supplements  Follow-up care  Follow up with your healthcare provider, or as advised. Contact one of the following for more information:  · American Association of Kidney Patients 753-480-9920 www.aakp.org  · National Kidney Foundation 192-978-8829 www.kidney.org  · American Kidney Fund 846-078-8312 www.kidneyfund.org  · National Kidney Disease Education Program 866-4KIDNEY www.nkdep.nih.gov  If an X-ray, ECG (cardiogram), or other diagnostic test was taken, you will be told of any new findings that may affect your care.  Call 911  Call 911 if you have any of the following:  · Severe weakness, dizziness, fainting, drowsiness, or confusion  · Chest pain or shortness of breath  · Heart beating fast, slow, or irregularly  When to seek medical advice  Call your healthcare provider right away  if any of these occur:  · Nausea or vomiting  · Fever of 100.4°F (38°C) or higher, or as directed by your healthcare provider  · Unexpected weight gain or swelling in the legs, ankles, or around the eyes  · Decrease or absent urine output  Date Last Reviewed: 9/1/2016  © 0910-0958 [employer]. [address] [place] [address], [place] [address]. All rights reserved. This information is not intended as a substitute for professional medical care. Always follow your healthcare professional's instructions.        Chronic Kidney Disease (CKD)     The role of the kidneys is to remove waste products and extra water from the blood.  When the kidneys do not work as they should, waste products begin to build up in the blood. This is called chronic kidney disease (CKD). CKD means that you have kidney damage or a decrease in kidney function lasting at least 3 months. CKD allows extra water, waste, and toxins to build up in the body. This can eventually become life-threatening. You might need dialysis or a kidney transplant to stay alive. This most severe form is called end stage renal disease.  Diabetes is the leading causes of chronic renal failure. Other causes include high blood pressure, hardening of the arteries (atherosclerosis), lupus, inflammation of the blood vessels (vasculitis), and past viral or bacterial infections. Certain over-the-counter pain medicines can cause renal failure when taken often over a long period of time. These include aspirin, ibuprofen, and related anti-inflammatory medicines called NSAIDs (nonsteroidal anti-inflammatory drugs).  Home care  The following guidelines will help you care for yourself at home:  · If you have diabetes, talk with your healthcare provider about keeping your blood sugar under control. Ask if you need to make and changes to your diet, lifestyle, or medicines.  · If you have high blood pressure:  ¨ Take prescribed medicine to lower your blood pressure to the  recommended goal of less than 130/80.  ¨ Start a regular exercise program that you enjoy. Check with your healthcare provider to be sure your planned exercise program is right for you.  ¨ Eat less salt (sodium). Your healthcare provider can tell you how much salt per day is safe for you.  · If you are overweight, talk with your healthcare provider about a weight loss plan.  · If you smoke, you must quit. Smoking makes kidney disease worse. Talk with your healthcare provider about ways to help you quit.  For more information, visit the following links:  ¨ www.smokefree.gov/sites/default/files/pdf/clearing-the-air-accessible.pdf  ¨ www.smokefree.gov  ¨ www.cancer.org/healthy/stayawayfromtobacco/guidetoquittingsmoking/  · Most people with CKD need to follow a special diet.  Be sure you understand yours. In general, you will need to limit protein, salt, potassium, and phosphorus. You also need to limit how much fluid you drink.   · CKD is a risk factor for heart disease. Talk with your healthcare provider about any other risk factors you might have and what you can do to lessen them.  · Talk with your healthcare provider about any medicines you are taking to find out if they need to be reduced or stopped.  · Don't use the following over-the-counter medicines, or consult your healthcare provider before using:  ¨ Aspirin and NSAIDs such as ibuprofen or naproxen. Using acetaminophen for fever or pain is OK.  ¨ Laxatives and antacids containing magnesium or aluminum  ¨ Fleet or phospho soda enemas containing phosphorus  ¨ Certain stomach acid-blocking medicine such as cimetidine or ranitidine   ¨ Decongestants containing pseudoephedrine   ¨ Herbal supplements  Follow-up care  Follow up with your healthcare provider, or as advised. Contact one of the following for more information:  · American Association of Kidney Patients 993-210-4176 www.aakp.org  · National Kidney Foundation 220-407-9978 www.kidney.org  · American Kidney  [facility] 463-970-5649 www.kidneyfund.org  · National Kidney Disease Education Program 866-4KIDNEY www.nkdep.nih.gov  If an X-ray, ECG (cardiogram), or other diagnostic test was taken, you will be told of any new findings that may affect your care.  Call 911  Call 911 if you have any of the following:  · Severe weakness, dizziness, fainting, drowsiness, or confusion  · Chest pain or shortness of breath  · Heart beating fast, slow, or irregularly  When to seek medical advice  Call your healthcare provider right away if any of these occur:  · Nausea or vomiting  · Fever of 100.4°F (38°C) or higher, or as directed by your healthcare provider  · Unexpected weight gain or swelling in the legs, ankles, or around the eyes  · Decrease or absent urine output  Date Last Reviewed: 9/1/2016  © 4921-4823 [employer]. [address] [place] [address], [place] [address]. All rights reserved. This information is not intended as a substitute for professional medical care. Always follow your healthcare professional's instructions.

## 2018-08-30 NOTE — PROGRESS NOTES
Subjective:       Patient ID: Mark Rivera is a 42 y.o. male.    Chief Complaint: Results    HPI    HTN F/u:    Adherence with meds? Yes  Home BP range: 130-140s  Side effects: Yes  Following a low salt diet ? No  Current exercise? Yes  Need of refills? No  Recent changes in blood pressure medication: Yes  Patient has been in pain or taking decongestants/anti-inflammatory/OCP/SSRI medication: no  Patient has other symptoms (headache, weakness,  blurry vision, chest pain, palpitations, SOB): no    Lost 9 lbs from diet changes! A little walking for exercise.     CKD 3 - reviewed blood work and new diagnosis with the patient today.     Proteinuria - reviewed blood work and new diagnosis with the patient today.     Alcohol - 1-2 times a month when he goes to a party 5-6 beers at a time.   Current Outpatient Medications on File Prior to Visit   Medication Sig Dispense Refill    amlodipine-benazepril (LOTREL) 10-40 mg per capsule Take 1 capsule by mouth once daily. 90 capsule 3    hydroCHLOROthiazide (HYDRODIURIL) 25 MG tablet Take 1 tablet (25 mg total) by mouth 2 (two) times daily. 90 tablet 3    tiZANidine (ZANAFLEX) 4 MG tablet Take 1 tablet (4 mg total) by mouth nightly as needed. Do not take with alcohol 25 tablet 0     No current facility-administered medications on file prior to visit.        Past Medical History:   Diagnosis Date    Hypertension     Hypertriglyceridemia 8/30/2018       Family History   Problem Relation Age of Onset    Diabetes Mother     Hypertension Father         reports that he quit smoking about 4 months ago. He has a 25.00 pack-year smoking history. he has never used smokeless tobacco. He reports that he drinks alcohol. He reports that he does not use drugs.    Review of Systems   Respiratory: Negative for chest tightness and shortness of breath.    Cardiovascular: Negative for chest pain and palpitations.       Objective:     Vitals:    08/30/18 0819   BP: 132/84   Pulse: 60   Resp: 20    Temp: 98.1 °F (36.7 °C)        Physical Exam   Constitutional: He appears well-developed. No distress.   HENT:   Head: Normocephalic and atraumatic.   Eyes: Conjunctivae are normal. No scleral icterus.   Pulmonary/Chest: Effort normal.   Neurological: He is alert.   Psychiatric: He has a normal mood and affect. His behavior is normal.   Nursing note and vitals reviewed.      Assessment:       1. Stage 3 chronic kidney disease    2. Proteinuria, unspecified type    3. Hypertension, well controlled    4. Hypertriglyceridemia    5. Hypercholesterolemia        Plan:       Mark was seen today for results.    Diagnoses and all orders for this visit:    Stage 3 chronic kidney disease  -     Protein, urine, timed; Future  -     Comprehensive metabolic panel; Future  New dx - pt educated on disease etiology, prognosis and treatment. Answered pts questions.  Pt handout given  Proteinuria, unspecified type  -     Protein, urine, timed; Future  New dx - pt educated on disease etiology, prognosis and treatment. Answered pts questions.    Hypertension, well controlled  - Chronic - stable     Pt is doing well on current therapy. No side effects noted. Will continue current therapy.    Hypertriglyceridemia  -     fenofibrate 160 MG Tab; Take 1 tablet (160 mg total) by mouth once daily.  New dx - pt educated on disease etiology, prognosis and treatment. Answered pts questions.    Hypercholesterolemia  -     Lipid panel; Future  Pt will try diet modifications and continue exercise with a goal of weight loss. Recheck in 3-4 months.          Follow-up in about 3 months (around 11/30/2018) for Hypertension, high cholesterol, ckd3.        Pt verbalized understanding and agreed with our plan.

## 2018-09-06 ENCOUNTER — LAB VISIT (OUTPATIENT)
Dept: LAB | Facility: HOSPITAL | Age: 43
End: 2018-09-06
Attending: FAMILY MEDICINE
Payer: COMMERCIAL

## 2018-09-06 DIAGNOSIS — N18.30 STAGE 3 CHRONIC KIDNEY DISEASE: ICD-10-CM

## 2018-09-06 DIAGNOSIS — R80.9 PROTEINURIA, UNSPECIFIED TYPE: ICD-10-CM

## 2018-09-06 LAB
PROT 24H UR-MRATE: 258 MG/SPEC
PROT UR-MCNC: 12 MG/DL
URINE COLLECTION DURATION: 24 HR
URINE VOLUME: 2150 ML

## 2018-09-06 PROCEDURE — 84156 ASSAY OF PROTEIN URINE: CPT

## 2018-12-05 ENCOUNTER — LAB VISIT (OUTPATIENT)
Dept: LAB | Facility: HOSPITAL | Age: 43
End: 2018-12-05
Attending: FAMILY MEDICINE
Payer: COMMERCIAL

## 2018-12-05 DIAGNOSIS — N18.30 STAGE 3 CHRONIC KIDNEY DISEASE: ICD-10-CM

## 2018-12-05 DIAGNOSIS — E78.00 HYPERCHOLESTEROLEMIA: ICD-10-CM

## 2018-12-05 LAB
ALBUMIN SERPL BCP-MCNC: 4 G/DL
ALP SERPL-CCNC: 51 U/L
ALT SERPL W/O P-5'-P-CCNC: 84 U/L
ANION GAP SERPL CALC-SCNC: 7 MMOL/L
AST SERPL-CCNC: 44 U/L
BILIRUB SERPL-MCNC: 0.5 MG/DL
BUN SERPL-MCNC: 40 MG/DL
CALCIUM SERPL-MCNC: 10.7 MG/DL
CHLORIDE SERPL-SCNC: 103 MMOL/L
CHOLEST SERPL-MCNC: 251 MG/DL
CHOLEST/HDLC SERPL: 6.1 {RATIO}
CO2 SERPL-SCNC: 28 MMOL/L
CREAT SERPL-MCNC: 2.2 MG/DL
EST. GFR  (AFRICAN AMERICAN): 40.6 ML/MIN/1.73 M^2
EST. GFR  (NON AFRICAN AMERICAN): 35.1 ML/MIN/1.73 M^2
GLUCOSE SERPL-MCNC: 161 MG/DL
HDLC SERPL-MCNC: 41 MG/DL
HDLC SERPL: 16.3 %
LDLC SERPL CALC-MCNC: 141.4 MG/DL
NONHDLC SERPL-MCNC: 210 MG/DL
POTASSIUM SERPL-SCNC: 4.2 MMOL/L
PROT SERPL-MCNC: 7.8 G/DL
SODIUM SERPL-SCNC: 138 MMOL/L
TRIGL SERPL-MCNC: 343 MG/DL

## 2018-12-05 PROCEDURE — 80053 COMPREHEN METABOLIC PANEL: CPT

## 2018-12-05 PROCEDURE — 80061 LIPID PANEL: CPT

## 2018-12-05 PROCEDURE — 36415 COLL VENOUS BLD VENIPUNCTURE: CPT | Mod: PO

## 2018-12-12 ENCOUNTER — OFFICE VISIT (OUTPATIENT)
Dept: FAMILY MEDICINE | Facility: CLINIC | Age: 43
End: 2018-12-12
Payer: COMMERCIAL

## 2018-12-12 VITALS
BODY MASS INDEX: 30 KG/M2 | TEMPERATURE: 98 F | RESPIRATION RATE: 16 BRPM | HEIGHT: 67 IN | WEIGHT: 191.13 LBS | SYSTOLIC BLOOD PRESSURE: 130 MMHG | OXYGEN SATURATION: 97 % | HEART RATE: 41 BPM | DIASTOLIC BLOOD PRESSURE: 80 MMHG

## 2018-12-12 DIAGNOSIS — E83.52 HYPERCALCEMIA: ICD-10-CM

## 2018-12-12 DIAGNOSIS — E78.2 MIXED HYPERLIPIDEMIA: ICD-10-CM

## 2018-12-12 DIAGNOSIS — G56.21 CUBITAL TUNNEL SYNDROME ON RIGHT: ICD-10-CM

## 2018-12-12 DIAGNOSIS — N18.30 STAGE 3 CHRONIC KIDNEY DISEASE: ICD-10-CM

## 2018-12-12 DIAGNOSIS — E66.3 OVERWEIGHT (BMI 25.0-29.9): ICD-10-CM

## 2018-12-12 DIAGNOSIS — R80.9 PROTEINURIA, UNSPECIFIED TYPE: ICD-10-CM

## 2018-12-12 DIAGNOSIS — I10 HYPERTENSION, WELL CONTROLLED: Primary | ICD-10-CM

## 2018-12-12 PROCEDURE — 3079F DIAST BP 80-89 MM HG: CPT | Mod: CPTII,S$GLB,, | Performed by: FAMILY MEDICINE

## 2018-12-12 PROCEDURE — 99214 OFFICE O/P EST MOD 30 MIN: CPT | Mod: S$GLB,,, | Performed by: FAMILY MEDICINE

## 2018-12-12 PROCEDURE — 3008F BODY MASS INDEX DOCD: CPT | Mod: CPTII,S$GLB,, | Performed by: FAMILY MEDICINE

## 2018-12-12 PROCEDURE — 99999 PR PBB SHADOW E&M-EST. PATIENT-LVL III: CPT | Mod: PBBFAC,,, | Performed by: FAMILY MEDICINE

## 2018-12-12 PROCEDURE — 3075F SYST BP GE 130 - 139MM HG: CPT | Mod: CPTII,S$GLB,, | Performed by: FAMILY MEDICINE

## 2018-12-12 RX ORDER — ROSUVASTATIN CALCIUM 20 MG/1
20 TABLET, COATED ORAL DAILY
Qty: 90 TABLET | Refills: 2 | Status: SHIPPED | OUTPATIENT
Start: 2018-12-12 | End: 2019-03-20

## 2018-12-12 RX ORDER — HYDROCHLOROTHIAZIDE 25 MG/1
25 TABLET ORAL DAILY
Qty: 90 TABLET | Refills: 3 | Status: SHIPPED | OUTPATIENT
Start: 2018-12-12 | End: 2019-10-31 | Stop reason: SDUPTHER

## 2018-12-12 NOTE — PROGRESS NOTES
Subjective:       Patient ID: Mark Rivera is a 43 y.o. male.    Chief Complaint: Hypertension (3 months follow up ); Hyperlipidemia (3 months follow up ); Stage 3 chronic kidney disease (3 months follow up ); and Numbness (pt c/o numbness in middle of palm when patient wakes up in the morning ; past month )    HPI    HPI     HTN F/u:     Adherence with meds? Yes  Home BP range: 130-140s  Side effects: Yes  Following a low salt diet ? No  Current exercise? Yes  Need of refills? No  Recent changes in blood pressure medication: Yes  Patient has been in pain or taking decongestants/anti-inflammatory/OCP/SSRI medication: no  Patient has other symptoms (headache, weakness,  blurry vision, chest pain, palpitations, SOB):     He has been limiting his alcohol intake     Up 5 lbs. A little walking for exercise 1 day per week.      CKD 3 - reviewed blood work and worsening eGFR today. No NSAID use.      Proteinuria - reviewed blood work and urien studies with  the patient today.   Current Outpatient Medications on File Prior to Visit   Medication Sig Dispense Refill    amlodipine-benazepril (LOTREL) 10-40 mg per capsule Take 1 capsule by mouth once daily. 90 capsule 3    fenofibrate 160 MG Tab Take 1 tablet (160 mg total) by mouth once daily. 90 tablet 3    tiZANidine (ZANAFLEX) 4 MG tablet Take 1 tablet (4 mg total) by mouth nightly as needed. Do not take with alcohol 25 tablet 0    [DISCONTINUED] FLUARIX QUAD 9915-0811, PF, 60 mcg (15 mcg x 4)/0.5 mL Syrg vaccine ADM 0.5ML IM UTD  0    [DISCONTINUED] hydroCHLOROthiazide (HYDRODIURIL) 25 MG tablet Take 1 tablet (25 mg total) by mouth 2 (two) times daily. 90 tablet 3     No current facility-administered medications on file prior to visit.        Past Medical History:   Diagnosis Date    Hypertension     Hypertriglyceridemia 8/30/2018       Family History   Problem Relation Age of Onset    Diabetes Mother     Hypertension Father         reports that he quit smoking  about 7 months ago. He has a 25.00 pack-year smoking history. he has never used smokeless tobacco. He reports that he drinks alcohol. He reports that he does not use drugs.    Review of Systems  see hpi  Objective:     Vitals:    12/12/18 0922   BP: 130/80   Pulse:    Resp:    Temp:         Physical Exam   Constitutional: He appears well-developed. No distress.   HENT:   Head: Normocephalic and atraumatic.   Eyes: Conjunctivae are normal. No scleral icterus.   Pulmonary/Chest: Effort normal.   Neurological: He is alert.   Psychiatric: He has a normal mood and affect. His behavior is normal.   Nursing note and vitals reviewed.      Assessment:       1. Hypertension, well controlled    2. Proteinuria, unspecified type    3. Stage 3 chronic kidney disease    4. Cubital tunnel syndrome on right    5. Mixed hyperlipidemia    6. Hypercalcemia    7. Overweight (BMI 25.0-29.9)        Plan:       Mark was seen today for hypertension, hyperlipidemia, stage 3 chronic kidney disease and numbness.    Diagnoses and all orders for this visit:    Hypertension, well controlled  - Chronic - stable     Pt is doing well on current therapy and is requesting a refill. No side effects noted. Will continue current therapy.      Proteinuria, unspecified type  -     Comprehensive metabolic panel; Future    Stage 3 chronic kidney disease  Chronic - worsening - patient asked to keep track of his blood pressure at home and to avoid any NSAID use.  I also encouraged his efforts to work on diet weight loss.    Cubital tunnel syndrome on right  New dx - pt educated on disease etiology, prognosis and treatment. Answered pts questions.    Mixed hyperlipidemia  -     rosuvastatin (CRESTOR) 20 MG tablet; Take 1 tablet (20 mg total) by mouth once daily. For high cholesterol  New dx - pt educated on disease etiology, prognosis and treatment. Answered pts questions.    Hypercalcemia  -     Calcium, ionized; Future    Hypertensive urgency  -      hydroCHLOROthiazide (HYDRODIURIL) 25 MG tablet; Take 1 tablet (25 mg total) by mouth once daily.    Overweight (BMI 25.0-29.9)  Advised weight loss          Follow-up in about 3 months (around 3/12/2019) for Hypertension, hld, ckd3.      Pt verbalized understanding and agreed with our plan.

## 2018-12-12 NOTE — PROGRESS NOTES
HPI     HTN F/u:     Adherence with meds? Yes  Home BP range: 130-140s  Side effects: Yes  Following a low salt diet ? No  Current exercise? Yes  Need of refills? No  Recent changes in blood pressure medication: Yes  Patient has been in pain or taking decongestants/anti-inflammatory/OCP/SSRI medication: no  Patient has other symptoms (headache, weakness,  blurry vision, chest pain, palpitations, SOB): no     Up 5 lbs. A little walking for exercise 1 day per week.      CKD 3 - reviewed blood work and worsening eGFR today. No NSAID use.      Proteinuria - reviewed blood work and new diagnosis with the patient today.

## 2019-03-13 ENCOUNTER — LAB VISIT (OUTPATIENT)
Dept: LAB | Facility: HOSPITAL | Age: 44
End: 2019-03-13
Attending: FAMILY MEDICINE
Payer: COMMERCIAL

## 2019-03-13 DIAGNOSIS — R80.9 PROTEINURIA, UNSPECIFIED TYPE: ICD-10-CM

## 2019-03-13 DIAGNOSIS — E83.52 HYPERCALCEMIA: ICD-10-CM

## 2019-03-13 LAB
ALBUMIN SERPL BCP-MCNC: 4.3 G/DL
ALP SERPL-CCNC: 49 U/L
ALT SERPL W/O P-5'-P-CCNC: 125 U/L
ANION GAP SERPL CALC-SCNC: 11 MMOL/L
AST SERPL-CCNC: 54 U/L
BILIRUB SERPL-MCNC: 0.7 MG/DL
BUN SERPL-MCNC: 26 MG/DL
CA-I BLDV-SCNC: 1.27 MMOL/L
CALCIUM SERPL-MCNC: 10.2 MG/DL
CHLORIDE SERPL-SCNC: 101 MMOL/L
CO2 SERPL-SCNC: 24 MMOL/L
CREAT SERPL-MCNC: 1.7 MG/DL
EST. GFR  (AFRICAN AMERICAN): 55.5 ML/MIN/1.73 M^2
EST. GFR  (NON AFRICAN AMERICAN): 48 ML/MIN/1.73 M^2
GLUCOSE SERPL-MCNC: 155 MG/DL
POTASSIUM SERPL-SCNC: 4 MMOL/L
PROT SERPL-MCNC: 7.3 G/DL
SODIUM SERPL-SCNC: 136 MMOL/L

## 2019-03-13 PROCEDURE — 80053 COMPREHEN METABOLIC PANEL: CPT

## 2019-03-13 PROCEDURE — 82330 ASSAY OF CALCIUM: CPT

## 2019-03-13 PROCEDURE — 36415 COLL VENOUS BLD VENIPUNCTURE: CPT | Mod: PO

## 2019-03-20 ENCOUNTER — OFFICE VISIT (OUTPATIENT)
Dept: FAMILY MEDICINE | Facility: CLINIC | Age: 44
End: 2019-03-20
Payer: COMMERCIAL

## 2019-03-20 ENCOUNTER — HOSPITAL ENCOUNTER (OUTPATIENT)
Dept: RADIOLOGY | Facility: HOSPITAL | Age: 44
Discharge: HOME | End: 2019-03-20
Attending: FAMILY MEDICINE
Payer: COMMERCIAL

## 2019-03-20 VITALS
RESPIRATION RATE: 16 BRPM | TEMPERATURE: 98 F | OXYGEN SATURATION: 97 % | WEIGHT: 191.38 LBS | HEIGHT: 67 IN | HEART RATE: 85 BPM | DIASTOLIC BLOOD PRESSURE: 75 MMHG | SYSTOLIC BLOOD PRESSURE: 130 MMHG | BODY MASS INDEX: 30.04 KG/M2

## 2019-03-20 DIAGNOSIS — L21.9 SEBORRHEIC DERMATITIS: ICD-10-CM

## 2019-03-20 DIAGNOSIS — R79.89 ELEVATED LIVER FUNCTION TESTS: ICD-10-CM

## 2019-03-20 DIAGNOSIS — E78.00 HYPERCHOLESTEROLEMIA: ICD-10-CM

## 2019-03-20 DIAGNOSIS — R79.89 ELEVATED LIVER FUNCTION TESTS: Primary | ICD-10-CM

## 2019-03-20 DIAGNOSIS — E78.1 HYPERTRIGLYCERIDEMIA: ICD-10-CM

## 2019-03-20 DIAGNOSIS — I10 HYPERTENSION, WELL CONTROLLED: ICD-10-CM

## 2019-03-20 PROBLEM — F10.10 ALCOHOL ABUSE: Status: ACTIVE | Noted: 2019-03-20

## 2019-03-20 PROCEDURE — 99999 PR PBB SHADOW E&M-EST. PATIENT-LVL IV: CPT | Mod: PBBFAC,,, | Performed by: FAMILY MEDICINE

## 2019-03-20 PROCEDURE — 3075F SYST BP GE 130 - 139MM HG: CPT | Mod: CPTII,S$GLB,, | Performed by: FAMILY MEDICINE

## 2019-03-20 PROCEDURE — 99214 PR OFFICE/OUTPT VISIT, EST, LEVL IV, 30-39 MIN: ICD-10-PCS | Mod: S$GLB,,, | Performed by: FAMILY MEDICINE

## 2019-03-20 PROCEDURE — 76705 ECHO EXAM OF ABDOMEN: CPT | Mod: TC

## 2019-03-20 PROCEDURE — 3075F PR MOST RECENT SYSTOLIC BLOOD PRESS GE 130-139MM HG: ICD-10-PCS | Mod: CPTII,S$GLB,, | Performed by: FAMILY MEDICINE

## 2019-03-20 PROCEDURE — 99999 PR PBB SHADOW E&M-EST. PATIENT-LVL IV: ICD-10-PCS | Mod: PBBFAC,,, | Performed by: FAMILY MEDICINE

## 2019-03-20 PROCEDURE — 3008F PR BODY MASS INDEX (BMI) DOCUMENTED: ICD-10-PCS | Mod: CPTII,S$GLB,, | Performed by: FAMILY MEDICINE

## 2019-03-20 PROCEDURE — 3078F PR MOST RECENT DIASTOLIC BLOOD PRESSURE < 80 MM HG: ICD-10-PCS | Mod: CPTII,S$GLB,, | Performed by: FAMILY MEDICINE

## 2019-03-20 PROCEDURE — 99214 OFFICE O/P EST MOD 30 MIN: CPT | Mod: S$GLB,,, | Performed by: FAMILY MEDICINE

## 2019-03-20 PROCEDURE — 76705 US ABDOMEN LIMITED: ICD-10-PCS | Mod: 26,,, | Performed by: RADIOLOGY

## 2019-03-20 PROCEDURE — 3078F DIAST BP <80 MM HG: CPT | Mod: CPTII,S$GLB,, | Performed by: FAMILY MEDICINE

## 2019-03-20 PROCEDURE — 76705 ECHO EXAM OF ABDOMEN: CPT | Mod: 26,,, | Performed by: RADIOLOGY

## 2019-03-20 PROCEDURE — 3008F BODY MASS INDEX DOCD: CPT | Mod: CPTII,S$GLB,, | Performed by: FAMILY MEDICINE

## 2019-03-20 RX ORDER — KETOCONAZOLE 20 MG/G
CREAM TOPICAL DAILY
Qty: 60 G | Refills: 3 | Status: SHIPPED | OUTPATIENT
Start: 2019-03-20 | End: 2020-10-29 | Stop reason: ALTCHOICE

## 2019-03-20 NOTE — PROGRESS NOTES
Subjective:       Patient ID: Mark Rivera is a 43 y.o. male.    Chief Complaint: Hypertension (3 months follow up ); Hyperlipidemia (3 months follow up ); and Chronic Kidney Disease (3 months follow up )    HPI    HTN F/u:     Adherence with meds? Yes  Home BP range: 130-140s/ mostly 130/70s  Side effects: No  Following a low salt diet ? No  Current exercise? Yes 2x per week.   Need of refills? No  Recent changes in blood pressure medication: Yes  Patient has been in pain or taking decongestants/anti-inflammatory/OCP/SSRI medication: no  Patient has other symptoms (headache, weakness,  blurry vision, chest pain, palpitations, SOB): No     CKD 3 with protenuria - Chronic - No hematuria. No Nsaid use.     HLD/ Hypertriglyceridemia - he is adherent. No se. No need of refills.     Alcohol intake - 1-2 beers 1-2 x per month.     Elevated LFts - continue to be elevated - reviewed today. No supplement use or street drugs      Post auricular rash - onset 1 _ month ago - constant and is itchy.   Current Outpatient Medications on File Prior to Visit   Medication Sig Dispense Refill    amlodipine-benazepril (LOTREL) 10-40 mg per capsule Take 1 capsule by mouth once daily. 90 capsule 3    fenofibrate 160 MG Tab Take 1 tablet (160 mg total) by mouth once daily. 90 tablet 3    hydroCHLOROthiazide (HYDRODIURIL) 25 MG tablet Take 1 tablet (25 mg total) by mouth once daily. 90 tablet 3    tiZANidine (ZANAFLEX) 4 MG tablet Take 1 tablet (4 mg total) by mouth nightly as needed. Do not take with alcohol 25 tablet 0    [DISCONTINUED] rosuvastatin (CRESTOR) 20 MG tablet Take 1 tablet (20 mg total) by mouth once daily. For high cholesterol 90 tablet 2     No current facility-administered medications on file prior to visit.        Past Medical History:   Diagnosis Date    Hypertension     Hypertriglyceridemia 8/30/2018       Family History   Problem Relation Age of Onset    Diabetes Mother     Hypertension Father         reports  that he quit smoking about 11 months ago. He has a 25.00 pack-year smoking history. he has never used smokeless tobacco. He reports that he drinks alcohol. He reports that he does not use drugs.    Review of Systems   Constitutional: Negative for chills and fever.   Gastrointestinal: Negative for abdominal pain, nausea and vomiting.   Skin: Negative for color change.       Objective:     Vitals:    03/20/19 0928   BP: 130/75   Pulse:    Resp:    Temp:         Physical Exam   Constitutional: He appears well-developed. No distress.   O   HENT:   Head: Normocephalic and atraumatic.       Eyes: Conjunctivae and EOM are normal.   Cardiovascular: Normal rate and regular rhythm. Exam reveals no gallop and no friction rub.   No murmur heard.  Pulmonary/Chest: Effort normal and breath sounds normal. No respiratory distress. He has no wheezes. He has no rales. He exhibits no tenderness.   Abdominal: Soft. Bowel sounds are normal. He exhibits no distension and no mass. There is no tenderness. There is no rebound and no guarding.   Musculoskeletal: He exhibits no edema.   No gross extremity deformity   Neurological: He is alert.   Psychiatric: He has a normal mood and affect. His behavior is normal.   Nursing note and vitals reviewed.      Assessment:       1. Elevated liver function tests    2. Hypertriglyceridemia    3. Hypercholesterolemia    4. Seborrheic dermatitis    5. Hypertension, well controlled        Plan:       Mark was seen today for hypertension, hyperlipidemia and chronic kidney disease.    Diagnoses and all orders for this visit:    Elevated liver function tests  -     Hepatitis panel, acute; Future  -     Cancel: US Abdomen Limited; Future  -     Comprehensive metabolic panel; Future  -     US Abdomen Limited; Future  Patient has persistently elevated liver function tests despite significant reduction alcohol intake per patient report.  Initially his alcohol intake was fairly heavy so it was assumed that this  was the culprit his elevated LFTs.  Will obtain hepatitis panel and DC his statin for 1 month and recheck CMP.  If still elevated may refer to GI.    Hypertriglyceridemia  - Chronic - stable     Pt is doing well on current therapy. No side effects noted. Will continue current therapy.    Hypercholesterolemia  Hold statin as above    Seborrheic dermatitis  -     ketoconazole (NIZORAL) 2 % cream; Apply topically once daily.  New dx - pt educated on disease etiology, prognosis and treatment. Answered pts questions.    Hypertension, well controlled  - Chronic - stable     Pt is doing well on current therapy. No side effects noted. Will continue current therapy.            Follow-up in about 3 months (around 6/20/2019) for Hypertension, ckd 3.        Pt verbalized understanding and agreed with our plan.

## 2019-03-21 ENCOUNTER — TELEPHONE (OUTPATIENT)
Dept: FAMILY MEDICINE | Facility: CLINIC | Age: 44
End: 2019-03-21

## 2019-03-21 DIAGNOSIS — R16.0 LIVER MASS: Primary | ICD-10-CM

## 2019-03-27 ENCOUNTER — HOSPITAL ENCOUNTER (OUTPATIENT)
Dept: RADIOLOGY | Facility: HOSPITAL | Age: 44
Discharge: HOME | End: 2019-03-27
Attending: FAMILY MEDICINE
Payer: COMMERCIAL

## 2019-03-27 DIAGNOSIS — R16.0 LIVER MASS: ICD-10-CM

## 2019-03-27 PROCEDURE — 74177 CT ABDOMEN PELVIS WITH CONTRAST: ICD-10-PCS | Mod: 26,,, | Performed by: RADIOLOGY

## 2019-03-27 PROCEDURE — 74177 CT ABD & PELVIS W/CONTRAST: CPT | Mod: TC

## 2019-03-27 PROCEDURE — 25500020 PHARM REV CODE 255: Performed by: FAMILY MEDICINE

## 2019-03-27 PROCEDURE — 74177 CT ABD & PELVIS W/CONTRAST: CPT | Mod: 26,,, | Performed by: RADIOLOGY

## 2019-03-27 RX ADMIN — IOHEXOL 15 ML: 300 INJECTION, SOLUTION INTRAVENOUS at 11:03

## 2019-03-27 RX ADMIN — IOHEXOL 100 ML: 350 INJECTION, SOLUTION INTRAVENOUS at 01:03

## 2019-04-02 ENCOUNTER — TELEPHONE (OUTPATIENT)
Dept: FAMILY MEDICINE | Facility: CLINIC | Age: 44
End: 2019-04-02

## 2019-04-02 NOTE — TELEPHONE ENCOUNTER
----- Message from Ronald Pearson MD sent at 4/1/2019  4:47 PM CDT -----  Please notify patient results are abnormal. Nothing emergent needs to be done. Please have the patient follow up with me in 2-4 weeks to discuss if not already scheduled in this time frame. Thanks.

## 2019-04-24 ENCOUNTER — LAB VISIT (OUTPATIENT)
Dept: LAB | Facility: HOSPITAL | Age: 44
End: 2019-04-24
Attending: FAMILY MEDICINE
Payer: COMMERCIAL

## 2019-04-24 ENCOUNTER — OFFICE VISIT (OUTPATIENT)
Dept: FAMILY MEDICINE | Facility: CLINIC | Age: 44
End: 2019-04-24
Payer: COMMERCIAL

## 2019-04-24 ENCOUNTER — TELEPHONE (OUTPATIENT)
Dept: FAMILY MEDICINE | Facility: CLINIC | Age: 44
End: 2019-04-24

## 2019-04-24 VITALS
BODY MASS INDEX: 29.93 KG/M2 | WEIGHT: 190.69 LBS | RESPIRATION RATE: 16 BRPM | SYSTOLIC BLOOD PRESSURE: 136 MMHG | OXYGEN SATURATION: 95 % | HEIGHT: 67 IN | DIASTOLIC BLOOD PRESSURE: 92 MMHG | TEMPERATURE: 99 F | HEART RATE: 93 BPM

## 2019-04-24 DIAGNOSIS — R16.0 LIVER MASS: Primary | ICD-10-CM

## 2019-04-24 DIAGNOSIS — R94.4 ABNORMAL CREATININE CLEARANCE GLOMERULAR FILTRATION: ICD-10-CM

## 2019-04-24 DIAGNOSIS — E78.1 HYPERTRIGLYCERIDEMIA: ICD-10-CM

## 2019-04-24 DIAGNOSIS — I10 HYPERTENSION, WELL CONTROLLED: ICD-10-CM

## 2019-04-24 LAB
ALBUMIN SERPL BCP-MCNC: 4.1 G/DL (ref 3.5–5.2)
ALP SERPL-CCNC: 60 U/L (ref 55–135)
ALT SERPL W/O P-5'-P-CCNC: 85 U/L (ref 10–44)
ANION GAP SERPL CALC-SCNC: 9 MMOL/L (ref 8–16)
AST SERPL-CCNC: 46 U/L (ref 10–40)
BILIRUB SERPL-MCNC: 0.7 MG/DL (ref 0.1–1)
BUN SERPL-MCNC: 23 MG/DL (ref 6–20)
CALCIUM SERPL-MCNC: 10.3 MG/DL (ref 8.7–10.5)
CHLORIDE SERPL-SCNC: 102 MMOL/L (ref 95–110)
CO2 SERPL-SCNC: 29 MMOL/L (ref 23–29)
CREAT SERPL-MCNC: 1.6 MG/DL (ref 0.5–1.4)
EST. GFR  (AFRICAN AMERICAN): 59.7 ML/MIN/1.73 M^2
EST. GFR  (NON AFRICAN AMERICAN): 51.6 ML/MIN/1.73 M^2
GLUCOSE SERPL-MCNC: 233 MG/DL (ref 70–110)
POTASSIUM SERPL-SCNC: 4 MMOL/L (ref 3.5–5.1)
PROT SERPL-MCNC: 7.6 G/DL (ref 6–8.4)
SODIUM SERPL-SCNC: 140 MMOL/L (ref 136–145)

## 2019-04-24 PROCEDURE — 99214 OFFICE O/P EST MOD 30 MIN: CPT | Mod: S$GLB,,, | Performed by: FAMILY MEDICINE

## 2019-04-24 PROCEDURE — 3008F PR BODY MASS INDEX (BMI) DOCUMENTED: ICD-10-PCS | Mod: CPTII,S$GLB,, | Performed by: FAMILY MEDICINE

## 2019-04-24 PROCEDURE — 99999 PR PBB SHADOW E&M-EST. PATIENT-LVL IV: CPT | Mod: PBBFAC,,, | Performed by: FAMILY MEDICINE

## 2019-04-24 PROCEDURE — 3008F BODY MASS INDEX DOCD: CPT | Mod: CPTII,S$GLB,, | Performed by: FAMILY MEDICINE

## 2019-04-24 PROCEDURE — 3080F DIAST BP >= 90 MM HG: CPT | Mod: CPTII,S$GLB,, | Performed by: FAMILY MEDICINE

## 2019-04-24 PROCEDURE — 80053 COMPREHEN METABOLIC PANEL: CPT

## 2019-04-24 PROCEDURE — 99214 PR OFFICE/OUTPT VISIT, EST, LEVL IV, 30-39 MIN: ICD-10-PCS | Mod: S$GLB,,, | Performed by: FAMILY MEDICINE

## 2019-04-24 PROCEDURE — 3080F PR MOST RECENT DIASTOLIC BLOOD PRESSURE >= 90 MM HG: ICD-10-PCS | Mod: CPTII,S$GLB,, | Performed by: FAMILY MEDICINE

## 2019-04-24 PROCEDURE — 3075F SYST BP GE 130 - 139MM HG: CPT | Mod: CPTII,S$GLB,, | Performed by: FAMILY MEDICINE

## 2019-04-24 PROCEDURE — 36415 COLL VENOUS BLD VENIPUNCTURE: CPT | Mod: PO

## 2019-04-24 PROCEDURE — 99999 PR PBB SHADOW E&M-EST. PATIENT-LVL IV: ICD-10-PCS | Mod: PBBFAC,,, | Performed by: FAMILY MEDICINE

## 2019-04-24 PROCEDURE — 3075F PR MOST RECENT SYSTOLIC BLOOD PRESS GE 130-139MM HG: ICD-10-PCS | Mod: CPTII,S$GLB,, | Performed by: FAMILY MEDICINE

## 2019-04-24 NOTE — TELEPHONE ENCOUNTER
----- Message from Alina Reid sent at 4/24/2019  2:54 PM CDT -----  Contact: [facility]   Name of Who is Calling: BRANDT VALDES [[id number]]    What is the request in detail: please fax orders for pt's MRI along with authorization fax: [phone].. Please advise      Can the clinic reply by [facility]: no      What [facility]: [phone]

## 2019-04-25 NOTE — PROGRESS NOTES
Please inform patient that his liver enzymes have come down which is a good thing.  His blood sugar was very high when it was checked yesterday.  Please have him limit sugar and carbohydrate intake including potatoes bread pasta and rice.

## 2019-04-25 NOTE — PROGRESS NOTES
Labs are mildly abnormal. His liver function has improved, but hsi blood sugar was very high. Please have him work to reduce the sugars and starches/carbohydrates in his diet (pasta/rice/bread/potatoes) I will discuss in detail at the patients next appointment.

## 2019-04-25 NOTE — PROGRESS NOTES
Subjective:       Patient ID: Mark Rivera is a 43 y.o. male.    Chief Complaint: Hypertension (follow up ) and Hyperlipidemia (follow up)    HPI    Liver mass  - reviewed ct scan results. 1.8 cm mass noted in R lobe. Radiologist advised MRi if further evaluation warranted. No abd pain, n/v, jaundice, weight loss.     Htn - chronic - improving. Ranging 130s/80s at home    HLD chronic stable - adherent with meds without side effect.  Current Outpatient Medications on File Prior to Visit   Medication Sig Dispense Refill    amlodipine-benazepril (LOTREL) 10-40 mg per capsule Take 1 capsule by mouth once daily. 90 capsule 3    fenofibrate 160 MG Tab Take 1 tablet (160 mg total) by mouth once daily. 90 tablet 3    hydroCHLOROthiazide (HYDRODIURIL) 25 MG tablet Take 1 tablet (25 mg total) by mouth once daily. 90 tablet 3    ketoconazole (NIZORAL) 2 % cream Apply topically once daily. 60 g 3    tiZANidine (ZANAFLEX) 4 MG tablet Take 1 tablet (4 mg total) by mouth nightly as needed. Do not take with alcohol 25 tablet 0     No current facility-administered medications on file prior to visit.        Past Medical History:   Diagnosis Date    Hypertension     Hypertriglyceridemia 8/30/2018       Family History   Problem Relation Age of Onset    Diabetes Mother     Hypertension Father         reports that he quit smoking about a year ago. He has a 25.00 pack-year smoking history. He has never used smokeless tobacco. He reports that he drinks alcohol. He reports that he does not use drugs.    Review of Systems  see hpi  Objective:     Vitals:    04/24/19 1312   BP: (!) 136/92   Pulse: 93   Resp: 16   Temp: 98.5 °F (36.9 °C)        Physical Exam   Constitutional: He appears well-developed. No distress.   o   HENT:   Head: Normocephalic and atraumatic.   Eyes: Conjunctivae are normal. No scleral icterus.   Pulmonary/Chest: Effort normal.   Neurological: He is alert.   Psychiatric: He has a normal mood and affect. His  behavior is normal.   Nursing note and vitals reviewed.      Assessment:       1. Liver mass    2. Abnormal creatinine clearance glomerular filtration    3. Hypertriglyceridemia    4. Hypertension, well controlled        Plan:       Mark was seen today for hypertension and hyperlipidemia.    Diagnoses and all orders for this visit:    Liver mass - new  -     MRI Abdomen With Contrast; Future  -     MRI Abdomen With Contrast  Spending a significant amount of time explaining risks and benefits of pursuing additional imaging, patient opted for MRI.    Abnormal creatinine clearance glomerular filtration  -     Comprehensive metabolic panel; Future    Hypertriglyceridemia  - Chronic - stable     Pt is doing well on current therapy. No side effects noted. Will continue current therapy.    Hypertension, well controlled  - Chronic - stable     Pt is doing well on current therapy. No side effects noted. Will continue current therapy.            Follow up in about 1 month (around 5/22/2019) for Decreased GFR, liver mass, htn.        Pt verbalized understanding and agreed with our plan.

## 2019-04-30 ENCOUNTER — TELEPHONE (OUTPATIENT)
Dept: FAMILY MEDICINE | Facility: CLINIC | Age: 44
End: 2019-04-30

## 2019-04-30 DIAGNOSIS — R16.0 LIVER MASS: Primary | ICD-10-CM

## 2019-04-30 NOTE — TELEPHONE ENCOUNTER
Rosendo with [facility] stated they need an updated order for the MRI to be  with and without for protocol [phone]

## 2019-04-30 NOTE — TELEPHONE ENCOUNTER
----- Message from Elmo Lindsey sent at 4/30/2019  1:42 PM CDT -----  Contact: Rosendo (SARI)  Name of Who is Calling: Rosendo REAL)      What is the request in detail: Needs an updated order for MRI with and without contrast for abdomen.. Fax [phone]      Can the clinic reply by [facility]: no      What [facility] Back [facility]: [phone]

## 2019-04-30 NOTE — TELEPHONE ENCOUNTER
----- Message from Christina Hines sent at 4/30/2019  1:40 PM CDT -----  Contact: Sister  Type: Patient Call Back    Who called: Sister- Toya Rivera    What is the request in detail: she says patient's order was sent to [facility] in [place] and they need additional information before they can schedule him .     Can the clinic reply by [facility]? No     Would the patient rather a call back or a response via [facility]? Call     Best call back number:[phone]

## 2019-05-01 ENCOUNTER — TELEPHONE (OUTPATIENT)
Dept: FAMILY MEDICINE | Facility: CLINIC | Age: 44
End: 2019-05-01

## 2019-05-01 NOTE — TELEPHONE ENCOUNTER
----- Message from Lou Reynoso sent at 5/1/2019  3:27 PM CDT -----  Contact: ioana with [employer] [facility] [phone]  [phone]  Type: Patient Call Back    Who called:ioana with [employer] [facility] [phone]  [phone]    What is the request in detail:need to know if patient has any type of implants. Has questions regards to mri. Patient doesn't speak english well.  Can the clinic reply by [facility]?    Would the patient rather a call back or a response via [facility]? Call back    Best call back number:ioana with [facility] [facility] [phone]  [phone]    Additional Information:

## 2019-05-08 ENCOUNTER — PATIENT OUTREACH (OUTPATIENT)
Dept: ADMINISTRATIVE | Facility: HOSPITAL | Age: 44
End: 2019-05-08

## 2019-05-14 ENCOUNTER — TELEPHONE (OUTPATIENT)
Dept: FAMILY MEDICINE | Facility: CLINIC | Age: 44
End: 2019-05-14

## 2019-05-14 NOTE — TELEPHONE ENCOUNTER
----- Message from Angelika Stephens sent at 5/14/2019  1:53 PM CDT -----  Contact: Thelma ( [facility] )   Name of Who is Calling: Thelma ( [facility] )       What is the request in detail:Thelma ( [facility] ) is returning a call from staff for the authorization for MRI patient is scheduled for exam in morning and she needs to know if paperwork was received  .....Please contact to further discuss and advise.     Can the clinic reply by [facility]: no     What Number to Call Back if not in [facility]: [phone] ext [phone]

## 2019-05-20 ENCOUNTER — TELEPHONE (OUTPATIENT)
Dept: FAMILY MEDICINE | Facility: CLINIC | Age: 44
End: 2019-05-20

## 2019-05-20 NOTE — TELEPHONE ENCOUNTER
Called pt, he put me on speaker phone so his sister will be able to hear since she speaks better english; informed her of OV on Wednesday to discuss more; she is requesting  at OV or can call her during visit so she can be able to understand results

## 2019-05-20 NOTE — TELEPHONE ENCOUNTER
----- Message from Lou Reynoso sent at 5/20/2019 10:06 AM CDT -----  Contact: pt  Type: Patient Call Back    Who called:pt    What is the request in detail:pt is requesting ct scan results. Call pt    Can the clinic reply by [facility]?    Would the patient rather a call back or a response via [facility]? Call back    Best call back number:[phone]    Additional Information:

## 2019-05-22 ENCOUNTER — LAB VISIT (OUTPATIENT)
Dept: LAB | Facility: HOSPITAL | Age: 44
End: 2019-05-22
Attending: FAMILY MEDICINE
Payer: COMMERCIAL

## 2019-05-22 ENCOUNTER — OFFICE VISIT (OUTPATIENT)
Dept: FAMILY MEDICINE | Facility: CLINIC | Age: 44
End: 2019-05-22
Payer: COMMERCIAL

## 2019-05-22 VITALS
OXYGEN SATURATION: 97 % | HEART RATE: 81 BPM | TEMPERATURE: 98 F | RESPIRATION RATE: 20 BRPM | DIASTOLIC BLOOD PRESSURE: 100 MMHG | SYSTOLIC BLOOD PRESSURE: 134 MMHG | HEIGHT: 67 IN | BODY MASS INDEX: 29.62 KG/M2 | WEIGHT: 188.69 LBS

## 2019-05-22 DIAGNOSIS — R16.0 LIVER MASS: ICD-10-CM

## 2019-05-22 DIAGNOSIS — I10 HYPERTENSION, UNCONTROLLED: ICD-10-CM

## 2019-05-22 DIAGNOSIS — M54.6 ACUTE LEFT-SIDED THORACIC BACK PAIN: ICD-10-CM

## 2019-05-22 DIAGNOSIS — R93.2 ABNORMAL MRI, LIVER: ICD-10-CM

## 2019-05-22 DIAGNOSIS — R93.2 ABNORMAL MRI, LIVER: Primary | ICD-10-CM

## 2019-05-22 PROBLEM — F10.11 H/O ALCOHOL ABUSE: Status: ACTIVE | Noted: 2019-03-20

## 2019-05-22 LAB
ALBUMIN SERPL BCP-MCNC: 4.2 G/DL (ref 3.5–5.2)
ALP SERPL-CCNC: 48 U/L (ref 55–135)
ALT SERPL W/O P-5'-P-CCNC: 74 U/L (ref 10–44)
ANION GAP SERPL CALC-SCNC: 9 MMOL/L (ref 8–16)
AST SERPL-CCNC: 41 U/L (ref 10–40)
BASOPHILS # BLD AUTO: 0.09 K/UL (ref 0–0.2)
BASOPHILS NFR BLD: 1.2 % (ref 0–1.9)
BILIRUB SERPL-MCNC: 0.5 MG/DL (ref 0.1–1)
BUN SERPL-MCNC: 22 MG/DL (ref 6–20)
CALCIUM SERPL-MCNC: 10.2 MG/DL (ref 8.7–10.5)
CHLORIDE SERPL-SCNC: 104 MMOL/L (ref 95–110)
CO2 SERPL-SCNC: 28 MMOL/L (ref 23–29)
CREAT SERPL-MCNC: 1.5 MG/DL (ref 0.5–1.4)
DIFFERENTIAL METHOD: NORMAL
EOSINOPHIL # BLD AUTO: 0.3 K/UL (ref 0–0.5)
EOSINOPHIL NFR BLD: 4.2 % (ref 0–8)
ERYTHROCYTE [DISTWIDTH] IN BLOOD BY AUTOMATED COUNT: 13.4 % (ref 11.5–14.5)
EST. GFR  (AFRICAN AMERICAN): >60 ML/MIN/1.73 M^2
EST. GFR  (NON AFRICAN AMERICAN): 55.8 ML/MIN/1.73 M^2
FERRITIN SERPL-MCNC: 434 NG/ML (ref 20–300)
GLUCOSE SERPL-MCNC: 131 MG/DL (ref 70–110)
HCT VFR BLD AUTO: 48.5 % (ref 40–54)
HGB BLD-MCNC: 15.8 G/DL (ref 14–18)
IMM GRANULOCYTES # BLD AUTO: 0.04 K/UL (ref 0–0.04)
IMM GRANULOCYTES NFR BLD AUTO: 0.5 % (ref 0–0.5)
IRON SERPL-MCNC: 103 UG/DL (ref 45–160)
LYMPHOCYTES # BLD AUTO: 2.8 K/UL (ref 1–4.8)
LYMPHOCYTES NFR BLD: 36.3 % (ref 18–48)
MCH RBC QN AUTO: 30 PG (ref 27–31)
MCHC RBC AUTO-ENTMCNC: 32.6 G/DL (ref 32–36)
MCV RBC AUTO: 92 FL (ref 82–98)
MONOCYTES # BLD AUTO: 0.5 K/UL (ref 0.3–1)
MONOCYTES NFR BLD: 6.8 % (ref 4–15)
NEUTROPHILS # BLD AUTO: 3.9 K/UL (ref 1.8–7.7)
NEUTROPHILS NFR BLD: 51 % (ref 38–73)
NRBC BLD-RTO: 0 /100 WBC
PLATELET # BLD AUTO: 277 K/UL (ref 150–350)
PMV BLD AUTO: 12.6 FL (ref 9.2–12.9)
POTASSIUM SERPL-SCNC: 4 MMOL/L (ref 3.5–5.1)
PROT SERPL-MCNC: 7.6 G/DL (ref 6–8.4)
RBC # BLD AUTO: 5.27 M/UL (ref 4.6–6.2)
SATURATED IRON: 23 % (ref 20–50)
SODIUM SERPL-SCNC: 141 MMOL/L (ref 136–145)
TOTAL IRON BINDING CAPACITY: 457 UG/DL (ref 250–450)
TRANSFERRIN SERPL-MCNC: 309 MG/DL (ref 200–375)
WBC # BLD AUTO: 7.69 K/UL (ref 3.9–12.7)

## 2019-05-22 PROCEDURE — 3080F DIAST BP >= 90 MM HG: CPT | Mod: CPTII,S$GLB,, | Performed by: FAMILY MEDICINE

## 2019-05-22 PROCEDURE — 3075F PR MOST RECENT SYSTOLIC BLOOD PRESS GE 130-139MM HG: ICD-10-PCS | Mod: CPTII,S$GLB,, | Performed by: FAMILY MEDICINE

## 2019-05-22 PROCEDURE — 99214 PR OFFICE/OUTPT VISIT, EST, LEVL IV, 30-39 MIN: ICD-10-PCS | Mod: S$GLB,,, | Performed by: FAMILY MEDICINE

## 2019-05-22 PROCEDURE — 36415 COLL VENOUS BLD VENIPUNCTURE: CPT | Mod: PO

## 2019-05-22 PROCEDURE — 85025 COMPLETE CBC W/AUTO DIFF WBC: CPT

## 2019-05-22 PROCEDURE — 82728 ASSAY OF FERRITIN: CPT

## 2019-05-22 PROCEDURE — 83540 ASSAY OF IRON: CPT

## 2019-05-22 PROCEDURE — 3080F PR MOST RECENT DIASTOLIC BLOOD PRESSURE >= 90 MM HG: ICD-10-PCS | Mod: CPTII,S$GLB,, | Performed by: FAMILY MEDICINE

## 2019-05-22 PROCEDURE — 3008F PR BODY MASS INDEX (BMI) DOCUMENTED: ICD-10-PCS | Mod: CPTII,S$GLB,, | Performed by: FAMILY MEDICINE

## 2019-05-22 PROCEDURE — 99214 OFFICE O/P EST MOD 30 MIN: CPT | Mod: S$GLB,,, | Performed by: FAMILY MEDICINE

## 2019-05-22 PROCEDURE — 80053 COMPREHEN METABOLIC PANEL: CPT

## 2019-05-22 PROCEDURE — 99999 PR PBB SHADOW E&M-EST. PATIENT-LVL IV: CPT | Mod: PBBFAC,,, | Performed by: FAMILY MEDICINE

## 2019-05-22 PROCEDURE — 99999 PR PBB SHADOW E&M-EST. PATIENT-LVL IV: ICD-10-PCS | Mod: PBBFAC,,, | Performed by: FAMILY MEDICINE

## 2019-05-22 PROCEDURE — 3075F SYST BP GE 130 - 139MM HG: CPT | Mod: CPTII,S$GLB,, | Performed by: FAMILY MEDICINE

## 2019-05-22 PROCEDURE — 3008F BODY MASS INDEX DOCD: CPT | Mod: CPTII,S$GLB,, | Performed by: FAMILY MEDICINE

## 2019-05-22 RX ORDER — TIZANIDINE 4 MG/1
4 TABLET ORAL NIGHTLY PRN
Qty: 25 TABLET | Refills: 0 | Status: SHIPPED | OUTPATIENT
Start: 2019-05-22 | End: 2020-10-29

## 2019-05-22 RX ORDER — AMLODIPINE AND BENAZEPRIL HYDROCHLORIDE 10; 40 MG/1; MG/1
1 CAPSULE ORAL DAILY
Qty: 90 CAPSULE | Refills: 3 | Status: SHIPPED | OUTPATIENT
Start: 2019-05-22 | End: 2019-10-31 | Stop reason: SDUPTHER

## 2019-05-22 RX ORDER — EPLERENONE 25 MG/1
25 TABLET, FILM COATED ORAL DAILY
Qty: 90 TABLET | Refills: 1 | Status: SHIPPED | OUTPATIENT
Start: 2019-05-22 | End: 2019-07-31

## 2019-05-22 NOTE — PROGRESS NOTES
Subjective:       Patient ID: Mark Rivera is a 43 y.o. male.    Chief Complaint: Decrease GFR (1 mo follow up ); Hypertension (1 mo follow up ); Liver Madd (1 mo follow up ); and Results (follow up MRI )    HPI   [facility]  used    MRI review - liver abnormalities - 1.8 cm undefined lesion heamgioma vs possible HCC.     Possible hemachromatosis - reviewed findings on MRI    l rhomboid strain - pt states that he has hurt his L rhomboid again and is requesting a refill on muslce relaxer    Unc htn - bp still high here, and runs 90s diastolic at best at home. Pt is adherent with medications. No side effects.      Current Outpatient Medications on File Prior to Visit   Medication Sig Dispense Refill    fenofibrate 160 MG Tab Take 1 tablet (160 mg total) by mouth once daily. 90 tablet 3    hydroCHLOROthiazide (HYDRODIURIL) 25 MG tablet Take 1 tablet (25 mg total) by mouth once daily. 90 tablet 3    ketoconazole (NIZORAL) 2 % cream Apply topically once daily. 60 g 3    [DISCONTINUED] amlodipine-benazepril (LOTREL) 10-40 mg per capsule Take 1 capsule by mouth once daily. 90 capsule 3    [DISCONTINUED] tiZANidine (ZANAFLEX) 4 MG tablet Take 1 tablet (4 mg total) by mouth nightly as needed. Do not take with alcohol 25 tablet 0     No current facility-administered medications on file prior to visit.        Past Medical History:   Diagnosis Date    Hypertension     Hypertriglyceridemia 8/30/2018       Family History   Problem Relation Age of Onset    Diabetes Mother     Hypertension Father         reports that he quit smoking about 13 months ago. He has a 25.00 pack-year smoking history. He has never used smokeless tobacco. He reports that he drinks alcohol. He reports that he does not use drugs.    Review of Systems   Constitutional: Negative for chills, fever and unexpected weight change.   Gastrointestinal: Negative for nausea and vomiting.       Objective:     Vitals:    05/22/19 0946   BP: (!)  134/100   Pulse: 81   Resp: 20   Temp: 98.1 °F (36.7 °C)        Physical Exam   Constitutional: He appears well-developed. No distress.   HENT:   Head: Normocephalic and atraumatic.   Eyes: Conjunctivae are normal. No scleral icterus.   Pulmonary/Chest: Effort normal.   Neurological: He is alert.   Psychiatric: He has a normal mood and affect. His behavior is normal.   Nursing note and vitals reviewed.      Assessment:       1. Abnormal MRI, liver    2. Liver mass    3. Hypertension, uncontrolled    4. Rhomboid Strain Acute       Plan:       Mark was seen today for decrease gfr, hypertension, liver madd and results.    Diagnoses and all orders for this visit:    Abnormal MRI, liver  -     CBC auto differential; Future  -     Ferritin; Future  -     Iron and TIBC; Future  -     MRI Abdomen W WO Contrast; Future  -     Comprehensive metabolic panel; Future  -     MRI Abdomen W WO Contrast  Liver mass - atypical hemagioma vs Hcc - 3 moth f/u mri recd, ordered today.     Answered pts questions. He is asxs.    MRi also suggested possible hemachromatosis  - will obtain iron studies.     Liver mass  -     MRI Abdomen W WO Contrast; Future  -     MRI Abdomen W WO Contrast    Hypertension, uncontrolled  -     eplerenone (INSPRA) 25 MG Tab; Take 1 tablet (25 mg total) by mouth once daily.  -     amlodipine-benazepril (LOTREL) 10-40 mg per capsule; Take 1 capsule by mouth once daily.  Pts blood pressure is uncontrolled. I advised the following lifestyle changes:  - exercise for 20 mins x 3-5 a week per AHA recommendations  - weight reduction if overweight by calorie restriction  - increase consumption of fruit/vegetables to 5 or more servings a day        - reduce sodium intake    At this stage, we discussed that their risk for MI and CVA is too high with their current BP, and will adjust their medications.          Pt asked to keep BP log with date/BP, taking BP at least 4 x a week. They will bring this with them to their  next appointment.     Pt asked to call me if BPs consistently above 140/90.    Pts questions were answered.    The 10-year ASCVD risk score ([place] KAREN Jr., et al., 2013) is: 4.4%    Values used to calculate the score:      Age: 43 years      Sex: Male      Is Non- : No      Diabetic: No      Tobacco smoker: No      Systolic Blood Pressure: 134 mmHg      Is BP treated: Yes      HDL Cholesterol: 41 mg/dL      Total Cholesterol: 251 mg/dL        Rhomboid Strain  -     tiZANidine (ZANAFLEX) 4 MG tablet; Take 1 tablet (4 mg total) by mouth nightly as needed. Do not take with alcohol  Refilled for recurrent strain          Follow up in about 6 weeks (around 7/3/2019) for Hypertension.        Pt verbalized understanding and agreed with our plan.     At least 25 minutes were spent today with the patient in the office, which more than 50% of the time was spent on evaluation and counseling regarding The primary encounter diagnosis was Abnormal MRI, liver. Diagnoses of Liver mass, Hypertension, uncontrolled, and Rhomboid Strain were also pertinent to this visit..

## 2019-05-23 ENCOUNTER — TELEPHONE (OUTPATIENT)
Dept: FAMILY MEDICINE | Facility: CLINIC | Age: 44
End: 2019-05-23

## 2019-05-23 DIAGNOSIS — R79.89 ELEVATED FERRITIN: Primary | ICD-10-CM

## 2019-05-23 DIAGNOSIS — R93.2 ABNORMAL MRI, LIVER: ICD-10-CM

## 2019-05-23 NOTE — TELEPHONE ENCOUNTER
Please inform pt that his blood work was not normal and he needs to be evaluated by a blood specialist. Nothing else needs to be done at this time.

## 2019-06-06 ENCOUNTER — PATIENT OUTREACH (OUTPATIENT)
Dept: ADMINISTRATIVE | Facility: HOSPITAL | Age: 44
End: 2019-06-06

## 2019-06-12 ENCOUNTER — TELEPHONE (OUTPATIENT)
Dept: HEMATOLOGY/ONCOLOGY | Facility: CLINIC | Age: 44
End: 2019-06-12

## 2019-06-12 ENCOUNTER — APPOINTMENT (OUTPATIENT)
Dept: LAB | Facility: HOSPITAL | Age: 44
End: 2019-06-12
Attending: INTERNAL MEDICINE
Payer: COMMERCIAL

## 2019-06-12 ENCOUNTER — TELEPHONE (OUTPATIENT)
Dept: FAMILY MEDICINE | Facility: CLINIC | Age: 44
End: 2019-06-12

## 2019-06-12 ENCOUNTER — INITIAL CONSULT (OUTPATIENT)
Dept: HEMATOLOGY/ONCOLOGY | Facility: CLINIC | Age: 44
End: 2019-06-12
Payer: COMMERCIAL

## 2019-06-12 VITALS
BODY MASS INDEX: 28.33 KG/M2 | HEIGHT: 68 IN | SYSTOLIC BLOOD PRESSURE: 137 MMHG | OXYGEN SATURATION: 97 % | HEART RATE: 80 BPM | DIASTOLIC BLOOD PRESSURE: 90 MMHG | TEMPERATURE: 98 F | WEIGHT: 186.94 LBS

## 2019-06-12 DIAGNOSIS — R16.0 HEPATOMEGALY: ICD-10-CM

## 2019-06-12 DIAGNOSIS — R93.5 ABNORMAL CT OF THE ABDOMEN: ICD-10-CM

## 2019-06-12 DIAGNOSIS — R79.89 ELEVATED FERRITIN: Primary | ICD-10-CM

## 2019-06-12 DIAGNOSIS — K76.89 LIVER NODULE: ICD-10-CM

## 2019-06-12 DIAGNOSIS — R74.8 ABNORMAL TRANSAMINASES: ICD-10-CM

## 2019-06-12 PROCEDURE — 99204 PR OFFICE/OUTPT VISIT, NEW, LEVL IV, 45-59 MIN: ICD-10-PCS | Mod: S$GLB,,, | Performed by: INTERNAL MEDICINE

## 2019-06-12 PROCEDURE — 99999 PR PBB SHADOW E&M-EST. PATIENT-LVL IV: CPT | Mod: PBBFAC,,, | Performed by: INTERNAL MEDICINE

## 2019-06-12 PROCEDURE — 99204 OFFICE O/P NEW MOD 45 MIN: CPT | Mod: S$GLB,,, | Performed by: INTERNAL MEDICINE

## 2019-06-12 PROCEDURE — 3075F PR MOST RECENT SYSTOLIC BLOOD PRESS GE 130-139MM HG: ICD-10-PCS | Mod: CPTII,S$GLB,, | Performed by: INTERNAL MEDICINE

## 2019-06-12 PROCEDURE — 3080F DIAST BP >= 90 MM HG: CPT | Mod: CPTII,S$GLB,, | Performed by: INTERNAL MEDICINE

## 2019-06-12 PROCEDURE — 3075F SYST BP GE 130 - 139MM HG: CPT | Mod: CPTII,S$GLB,, | Performed by: INTERNAL MEDICINE

## 2019-06-12 PROCEDURE — 3008F BODY MASS INDEX DOCD: CPT | Mod: CPTII,S$GLB,, | Performed by: INTERNAL MEDICINE

## 2019-06-12 PROCEDURE — 3080F PR MOST RECENT DIASTOLIC BLOOD PRESSURE >= 90 MM HG: ICD-10-PCS | Mod: CPTII,S$GLB,, | Performed by: INTERNAL MEDICINE

## 2019-06-12 PROCEDURE — 99999 PR PBB SHADOW E&M-EST. PATIENT-LVL IV: ICD-10-PCS | Mod: PBBFAC,,, | Performed by: INTERNAL MEDICINE

## 2019-06-12 PROCEDURE — 3008F PR BODY MASS INDEX (BMI) DOCUMENTED: ICD-10-PCS | Mod: CPTII,S$GLB,, | Performed by: INTERNAL MEDICINE

## 2019-06-12 NOTE — TELEPHONE ENCOUNTER
----- Message from Nayana Osuna RN sent at 6/12/2019  1:54 PM CDT -----  MRI results needed, pt seeing  now. Can someone please scan into media or fax to [phone]?  Thanks  Jenn

## 2019-06-12 NOTE — TELEPHONE ENCOUNTER
MRI results already sent to scanning to be placed in patient chart.   Called [facility] to obtain a copy, no answer. VM picked up.

## 2019-06-12 NOTE — PROGRESS NOTES
Subjective:       Patient ID: Mark Rivera is a 43 y.o. male.    Chief Complaint: Consult    HPI     REASON FOR CONSULTATION: Elevated Ferritin  REFERRING PHYSICIAN: Ronald Pearson MD      Pt is 43-year-old limited English speaking male with history of hypertension, hypertriglyceridemia, CKD  seen today in consultation for elevated ferritin.. Communicated with pt via [facility]  .  He is accompanied by a friend.  Patient denies any prior history of abnormal iron studies.  No history of diabetes mellitus.  No family history of diabetes mellitus.  Patient denies history of liver disease.  No history of hepatitis.  Appetite and weight stable.   No fatigue.  He is a former smoker.  He denies history of ETOH abuse.  He reports he was a heavy drinker and his 20s but rarely drinks in the past 5 years. CT a/p w/contrast 3/27/2019 reveals enlarged decreased attenuation of the liver suggesting hepatomegaly and hepatic steatosis. There is a 1.8 cm hyperdense nodule in the right.  He underwent further evaluation with MRI imaging which was performed at an outside facility.  No report available at time at today's visit.  CBC from  reveals a white blood cell count of 7 690/III hemoglobin of 15.8 grams/deciliter hematocrit of 40.5% MCV of 92 and a platelet count of 277 K. Iron studies reveal a serum iron of 103 TIBC of 457 iron sats of 20 for in a ferritin of 434.  He is here for further evaluation          Past Medical History:   Diagnosis Date    Hypertension     Hypertriglyceridemia 2018         No past surgical history on file.     Social History     Tobacco Use    Smoking status: Former Smoker     Packs/day: 1.00     Years: 25.00     Pack years: 25.00     Last attempt to quit: 2018     Years since quittin.1    Smokeless tobacco: Never Used   Substance Use Topics    Alcohol use: Yes     Comment: socially    Drug use: No       Review of Systems   Constitutional: Negative for appetite change,  "fatigue, fever and unexpected weight change.   HENT: Negative for mouth sores.    Eyes: Negative for visual disturbance.   Respiratory: Negative for cough and shortness of breath.    Cardiovascular: Negative for chest pain.   Gastrointestinal: Negative for abdominal pain and diarrhea.   Genitourinary: Negative for frequency.   Musculoskeletal: Negative for back pain.   Skin: Negative for rash.   Neurological: Negative for headaches.   Hematological: Negative for adenopathy.   Psychiatric/Behavioral: The patient is not nervous/anxious.        Objective:        Vitals:    06/12/19 1338   BP: (!) 137/90   BP Location: Left arm   Patient Position: Sitting   BP Method: Medium (Automatic)   Pulse: 80   Temp: 98.4 °F (36.9 °C)   TempSrc: Oral   SpO2: 97%   Weight: 84.8 kg (186 lb 15.2 oz)   Height: 5' 8" (1.727 m)       Physical Exam   Constitutional: He is oriented to person, place, and time. He appears well-developed and well-nourished.   HENT:   Head: Normocephalic.   Mouth/Throat: Oropharynx is clear and moist. No oropharyngeal exudate.   Eyes: Pupils are equal, round, and reactive to light. Conjunctivae are normal. No scleral icterus.   Neck: Normal range of motion. Neck supple. No thyromegaly present.   Cardiovascular: Normal rate, regular rhythm and normal heart sounds.   No murmur heard.  Pulmonary/Chest: Effort normal and breath sounds normal. He has no wheezes. He has no rales.   Abdominal: Soft. Bowel sounds are normal. He exhibits no distension and no mass. There is no hepatosplenomegaly. There is no tenderness. There is no rebound and no guarding.   Musculoskeletal: Normal range of motion. He exhibits no edema.   Lymphadenopathy:     He has no cervical adenopathy.     He has no axillary adenopathy.        Right: No supraclavicular adenopathy present.        Left: No supraclavicular adenopathy present.   Neurological: He is alert and oriented to person, place, and time. No cranial nerve deficit.   Skin: No rash " noted. No erythema.   Psychiatric: He has a normal mood and affect.         CT a/p w/contrast 3/27/2019   Enlarged decreased attenuation of the liver suggesting hepatomegaly and hepatic steatosis.    There is a 1.8 cm hyperdense nodule in the right lobe.  No prior precontrast images are submitted.  Etiology is indeterminate.  The lesion remains hyperdense on all phases.  If further evaluation desired MRI liver protocol may be of benefit.    Hypodensities in the kidneys consistent with cysts.    Additional findings as above.    Lab Results   Component Value Date    WBC 7.69 05/22/2019    HGB 15.8 05/22/2019    HCT 48.5 05/22/2019    MCV 92 05/22/2019     05/22/2019     Lab Results   Component Value Date    IRON 103 05/22/2019    TIBC 457 (H) 05/22/2019    FERRITIN 434 (H) 05/22/2019         Assessment:       1. Elevated ferritin    2. Abnormal CT of the abdomen    3. Liver nodule    4. Hepatomegaly    5. Abnormal transaminases        Plan:       Patient is a 43-year-old with abnormal CT imaging revealing  suggesting hepatomegaly and hepatic steatosis and an indeterminate 1.8 cm hyperdense nodule  Right lobe of liver .  Patient also with elevated transaminases.  Iron studies revealed a mildly elevated ferritin with normal iron sats.  Plan testing including hemochromatosis DNA analysis, ITZ,, CMP, HIV, anti smooth muscle antibody.  Follow-up recent MRI imaging results.  Plan Ambulatory Referral to Hepatology for further evaluation of abnormal imaging studies of liver in setting of elevated transaminases and abnormal iron studies and need for liver biopsy.  All questions posed answered to patient's satisfaction Follow up in 2 months    Thank you for allowing me to participate in the care of your patient      :cc:  Ronald Pearson MD

## 2019-06-12 NOTE — LETTER
June 12, 2019      Ronald Pearson MD  [address] [facility] [address]           [facility]  [address] [facility] [address]  [place] [address]  Phone: [phone]          Patient: Mark Rivera   MR Number: [id number]   YOB: 1975   Date of Visit: 6/12/2019       Dear Dr. Ronald Pearson:    Thank you for referring Mark Rivera to me for evaluation. Attached you will find relevant portions of my assessment and plan of care.    If you have questions, please do not hesitate to call me. I look forward to following Mark Rivera along with you.    Sincerely,    Altagracia Kline MD    Enclosure  CC:  No Recipients    If you would like to receive this communication electronically, please contact [email or web address] or [phone] to request more information on alike Link access.    For providers and/or their staff who would like to refer a patient to [facility], please contact us through our one-stop-shop provider referral line, [facility], at [phone].    If you feel you have received this communication in error or would no longer like to receive these types of communications, please e-mail [email or web address]

## 2019-06-20 ENCOUNTER — OFFICE VISIT (OUTPATIENT)
Dept: FAMILY MEDICINE | Facility: CLINIC | Age: 44
End: 2019-06-20
Payer: COMMERCIAL

## 2019-06-20 VITALS
BODY MASS INDEX: 28.37 KG/M2 | DIASTOLIC BLOOD PRESSURE: 90 MMHG | OXYGEN SATURATION: 96 % | TEMPERATURE: 98 F | WEIGHT: 187.19 LBS | HEIGHT: 68 IN | RESPIRATION RATE: 20 BRPM | HEART RATE: 87 BPM | SYSTOLIC BLOOD PRESSURE: 130 MMHG

## 2019-06-20 DIAGNOSIS — I10 HYPERTENSION, UNCONTROLLED: Primary | ICD-10-CM

## 2019-06-20 DIAGNOSIS — N18.30 STAGE 3 CHRONIC KIDNEY DISEASE: ICD-10-CM

## 2019-06-20 DIAGNOSIS — E78.1 HYPERTRIGLYCERIDEMIA: ICD-10-CM

## 2019-06-20 DIAGNOSIS — R93.2 ABNORMAL MRI, LIVER: ICD-10-CM

## 2019-06-20 PROCEDURE — 99999 PR PBB SHADOW E&M-EST. PATIENT-LVL III: ICD-10-PCS | Mod: PBBFAC,,, | Performed by: FAMILY MEDICINE

## 2019-06-20 PROCEDURE — 99214 PR OFFICE/OUTPT VISIT, EST, LEVL IV, 30-39 MIN: ICD-10-PCS | Mod: S$GLB,,, | Performed by: FAMILY MEDICINE

## 2019-06-20 PROCEDURE — 99999 PR PBB SHADOW E&M-EST. PATIENT-LVL III: CPT | Mod: PBBFAC,,, | Performed by: FAMILY MEDICINE

## 2019-06-20 PROCEDURE — 3075F PR MOST RECENT SYSTOLIC BLOOD PRESS GE 130-139MM HG: ICD-10-PCS | Mod: CPTII,S$GLB,, | Performed by: FAMILY MEDICINE

## 2019-06-20 PROCEDURE — 3008F BODY MASS INDEX DOCD: CPT | Mod: CPTII,S$GLB,, | Performed by: FAMILY MEDICINE

## 2019-06-20 PROCEDURE — 3008F PR BODY MASS INDEX (BMI) DOCUMENTED: ICD-10-PCS | Mod: CPTII,S$GLB,, | Performed by: FAMILY MEDICINE

## 2019-06-20 PROCEDURE — 3080F DIAST BP >= 90 MM HG: CPT | Mod: CPTII,S$GLB,, | Performed by: FAMILY MEDICINE

## 2019-06-20 PROCEDURE — 3080F PR MOST RECENT DIASTOLIC BLOOD PRESSURE >= 90 MM HG: ICD-10-PCS | Mod: CPTII,S$GLB,, | Performed by: FAMILY MEDICINE

## 2019-06-20 PROCEDURE — 99214 OFFICE O/P EST MOD 30 MIN: CPT | Mod: S$GLB,,, | Performed by: FAMILY MEDICINE

## 2019-06-20 PROCEDURE — 3075F SYST BP GE 130 - 139MM HG: CPT | Mod: CPTII,S$GLB,, | Performed by: FAMILY MEDICINE

## 2019-06-20 NOTE — PROGRESS NOTES
Subjective:       Patient ID: Mark Rivera is a 43 y.o. male.    Chief Complaint: Hypertension (follow up) and Chronic Kidney Disease (follow up)    HPI     [place] used through [employer].         HTN F/u:     Adherence with meds? Yes  Home BP range: 130/80s-90s  Side effects: No  Following a low salt diet ? No  Current exercise? Yes 2-3x per week.   Need of refills? No  Recent changes in blood pressure medication: Yes  Patient has been in pain or taking decongestants/anti-inflammatory/OCP/SSRI medication: no  Patient has other symptoms (headache, weakness,  blurry vision, chest pain, palpitations, SOB): No     CKD 3 with protenuria - Chronic - No hematuria. No Nsaid use.      HLD/ Hypertriglyceridemia - he is adherent. No se. No need of refills.       Current Outpatient Medications on File Prior to Visit   Medication Sig Dispense Refill    amlodipine-benazepril (LOTREL) 10-40 mg per capsule Take 1 capsule by mouth once daily. 90 capsule 3    eplerenone (INSPRA) 25 MG Tab Take 1 tablet (25 mg total) by mouth once daily. 90 tablet 1    fenofibrate 160 MG Tab Take 1 tablet (160 mg total) by mouth once daily. 90 tablet 3    hydroCHLOROthiazide (HYDRODIURIL) 25 MG tablet Take 1 tablet (25 mg total) by mouth once daily. 90 tablet 3    ketoconazole (NIZORAL) 2 % cream Apply topically once daily. 60 g 3    tiZANidine (ZANAFLEX) 4 MG tablet Take 1 tablet (4 mg total) by mouth nightly as needed. Do not take with alcohol 25 tablet 0     No current facility-administered medications on file prior to visit.        Past Medical History:   Diagnosis Date    Hypertension     Hypertriglyceridemia 8/30/2018       Family History   Problem Relation Age of Onset    Diabetes Mother     Hypertension Father         reports that he quit smoking about 14 months ago. He started smoking about 30 years ago. He has a 25.00 pack-year smoking history. He has never used smokeless tobacco. He reports that he drinks alcohol. He reports  that he does not use drugs.    Review of Systems   Respiratory: Negative for chest tightness and shortness of breath.    Cardiovascular: Negative for chest pain and palpitations.       Objective:     Vitals:    06/20/19 0944   BP: (!) 130/90   Pulse:    Resp:    Temp:         Physical Exam   Constitutional: He appears well-developed. No distress.   o   HENT:   Head: Normocephalic and atraumatic.   Eyes: Conjunctivae are normal. No scleral icterus.   Pulmonary/Chest: Effort normal.   Neurological: He is alert.   Psychiatric: He has a normal mood and affect. His behavior is normal.   Nursing note and vitals reviewed.      Assessment:       1. Hypertension, uncontrolled    2. Abnormal MRI, liver    3. Hypertriglyceridemia    4. Stage 3 chronic kidney disease        Plan:       Mark was seen today for hypertension and chronic kidney disease.    Diagnoses and all orders for this visit:    Hypertension, uncontrolled  Pts blood pressure is uncontrolled.          Pt asked to keep BP log with date/BP, taking BP at least 4 x a week. They will bring this with them to their next appointment.     Pt asked to call me if BPs consistently above 140/90.    Pts questions were answered.    The 10-year CVD risk score (D'Agostino, et al., 2008) is: 11.9%    Values used to calculate the score:      Age: 43 years      Sex: Male      Diabetic: No      Tobacco smoker: No      Systolic Blood Pressure: 130 mmHg      Is BP treated: Yes      HDL Cholesterol: 41 mg/dL      Total Cholesterol: 251 mg/dL    2 week nurse bp visit. May increase eplerenone if high    Abnormal MRI, liver  F/u hepatology as scheudled    Hypertriglyceridemia  - Chronic - stable     Pt is doing well on current therapy. No side effects noted. Will continue current therapy.    Stage 3 chronic kidney disease  Chronic - stable - avoid nsaids. Bp control.           Follow up in about 6 weeks (around 8/1/2019) for Hypertension, ckd 3, htg.      Pt verbalized understanding and  agreed with our plan.

## 2019-06-20 NOTE — PROGRESS NOTES
Health Maintenance Due   Topic     Pneumococcal Vaccine (Medium Risk) (1 of 1 - PPSV23) Consult with Dr Pearson

## 2019-06-21 ENCOUNTER — TELEPHONE (OUTPATIENT)
Dept: FAMILY MEDICINE | Facility: CLINIC | Age: 44
End: 2019-06-21

## 2019-06-21 NOTE — TELEPHONE ENCOUNTER
----- Message from Jed Ayala sent at 6/21/2019  2:45 PM CDT -----  Contact: Rep Simón CHRISTY  .Type: Patient Call Back    Who called:[facility] Rep    What is the request in detail: Pt is confused on which medication he should take    Can the clinic reply by [facility]?no     Would the patient rather a call back or a response via [facility]? Call Back     Best call back number:[phone]    Additional Information:

## 2019-06-21 NOTE — TELEPHONE ENCOUNTER
Called boogie and Rosado has left for the day, other staff does not know what the call was regarding; Rosado is listed as emergency contact, called that number, no answer; LM to call office

## 2019-07-03 ENCOUNTER — PROCEDURE VISIT (OUTPATIENT)
Dept: HEPATOLOGY | Facility: CLINIC | Age: 44
End: 2019-07-03
Attending: NURSE PRACTITIONER
Payer: COMMERCIAL

## 2019-07-03 ENCOUNTER — OFFICE VISIT (OUTPATIENT)
Dept: HEPATOLOGY | Facility: CLINIC | Age: 44
End: 2019-07-03
Payer: COMMERCIAL

## 2019-07-03 VITALS
WEIGHT: 185.44 LBS | OXYGEN SATURATION: 97 % | DIASTOLIC BLOOD PRESSURE: 92 MMHG | HEART RATE: 87 BPM | SYSTOLIC BLOOD PRESSURE: 146 MMHG | HEIGHT: 68 IN | TEMPERATURE: 97 F | RESPIRATION RATE: 18 BRPM | BODY MASS INDEX: 28.1 KG/M2

## 2019-07-03 DIAGNOSIS — E78.00 HYPERCHOLESTEROLEMIA: ICD-10-CM

## 2019-07-03 DIAGNOSIS — E66.3 OVERWEIGHT (BMI 25.0-29.9): ICD-10-CM

## 2019-07-03 DIAGNOSIS — R74.8 ELEVATED LIVER ENZYMES: ICD-10-CM

## 2019-07-03 DIAGNOSIS — R16.0 LIVER MASS: Primary | ICD-10-CM

## 2019-07-03 DIAGNOSIS — K76.0 NAFLD (NONALCOHOLIC FATTY LIVER DISEASE): ICD-10-CM

## 2019-07-03 DIAGNOSIS — R16.0 LIVER MASS: ICD-10-CM

## 2019-07-03 DIAGNOSIS — I10 HYPERTENSION, WELL CONTROLLED: ICD-10-CM

## 2019-07-03 PROCEDURE — 3077F SYST BP >= 140 MM HG: CPT | Mod: CPTII,S$GLB,, | Performed by: NURSE PRACTITIONER

## 2019-07-03 PROCEDURE — 3080F DIAST BP >= 90 MM HG: CPT | Mod: CPTII,S$GLB,, | Performed by: NURSE PRACTITIONER

## 2019-07-03 PROCEDURE — 3008F PR BODY MASS INDEX (BMI) DOCUMENTED: ICD-10-PCS | Mod: CPTII,S$GLB,, | Performed by: NURSE PRACTITIONER

## 2019-07-03 PROCEDURE — 3077F PR MOST RECENT SYSTOLIC BLOOD PRESSURE >= 140 MM HG: ICD-10-PCS | Mod: CPTII,S$GLB,, | Performed by: NURSE PRACTITIONER

## 2019-07-03 PROCEDURE — 3008F BODY MASS INDEX DOCD: CPT | Mod: CPTII,S$GLB,, | Performed by: NURSE PRACTITIONER

## 2019-07-03 PROCEDURE — 99999 PR PBB SHADOW E&M-EST. PATIENT-LVL V: CPT | Mod: PBBFAC,,, | Performed by: NURSE PRACTITIONER

## 2019-07-03 PROCEDURE — 3080F PR MOST RECENT DIASTOLIC BLOOD PRESSURE >= 90 MM HG: ICD-10-PCS | Mod: CPTII,S$GLB,, | Performed by: NURSE PRACTITIONER

## 2019-07-03 PROCEDURE — 99204 PR OFFICE/OUTPT VISIT, NEW, LEVL IV, 45-59 MIN: ICD-10-PCS | Mod: S$GLB,,, | Performed by: NURSE PRACTITIONER

## 2019-07-03 PROCEDURE — 91200 PR LIVER ELASTOGRAPHY W/OUT IMAG W/INTERP & REPORT: ICD-10-PCS | Mod: S$GLB,,, | Performed by: NURSE PRACTITIONER

## 2019-07-03 PROCEDURE — 91200 LIVER ELASTOGRAPHY: CPT | Mod: S$GLB,,, | Performed by: NURSE PRACTITIONER

## 2019-07-03 PROCEDURE — 99204 OFFICE O/P NEW MOD 45 MIN: CPT | Mod: S$GLB,,, | Performed by: NURSE PRACTITIONER

## 2019-07-03 PROCEDURE — 99999 PR PBB SHADOW E&M-EST. PATIENT-LVL V: ICD-10-PCS | Mod: PBBFAC,,, | Performed by: NURSE PRACTITIONER

## 2019-07-03 NOTE — PROGRESS NOTES
I have reviewed and concur with the JANICE's history, physical, assessment, and plan.  I have personally interviewed and examined the patient at bedside.  See below addendum for my evaluation and additional findings.     43 y.o. male that presents for evaluation of elevated liver tests and imaging with fatty liver/indeterminate liver lesion.   Serologic work-up has been negative.  Will obtain ceruloplasmin and A1AT serologies to complete work-up.  Risk factors for GRIMES present and discussed importance for moderate alcohol use (no current heavy use to account for enzyme elevation by report).  No clinical symptoms of chronic liver disease    Recommend fibroscan for fibrosis staging  Review MRI in IR conference to determine surveillance vs biopsy, will obtain AFP with labs today    Patient will return to clinic with Kierra Serra NP

## 2019-07-03 NOTE — LETTER
July 3, 2019      Altagracia Kline MD  [address] [facility] [address]  [address]  [place] [address]           [facility] Hepat[facility]  [address]  [place] [address]  Phone: [phone]  Fax: [phone]          Patient: Mark Rivera   MR Number: [id number]   YOB: 1975   Date of Visit: 7/3/2019       Dear Dr. Altagracia Kline:    Thank you for referring Mark Rivera to me for evaluation. Attached you will find relevant portions of my assessment and plan of care.    If you have questions, please do not hesitate to call me. I look forward to following Mark Rivera along with you.    Sincerely,    Kierra Serra, BRIAN    Enclosure  CC:  No Recipients    If you would like to receive this communication electronically, please contact [email or web address] or [phone] to request more information on [employer] Link access.    For providers and/or their staff who would like to refer a patient to [facility], please contact us through our one-stop-shop provider referral line, [facility] , at [phone].    If you feel you have received this communication in error or would no longer like to receive these types of communications, please e-mail [email or web address]

## 2019-07-03 NOTE — PATIENT INSTRUCTIONS
1. Weight loss goal of 15 lbs  2. Low fat, low cholesterol, low carb, high fiber, high protein diet  3. Exercise, 5 days a week, 30 min a day  4. Recommend good control of cholesterol, blood pressure, blood sugar levels  5. Fibroscan today for fibrosis and steatosis staging  6. Labs today  7. Will check immunity markers for HBV/HAV and arrange for vaccination if needed  8. Will review scans in IR conference next week and call you with the recommendations  9. Follow up pending results         Nonalcoholic Fatty Liver Disease (NAFLD)  Nonalcoholic fatty liver disease (NAFLD) is a common disease of the liver. It occurs when you have too much fat in the liver. If NAFLD is severe, it can cause liver damage that seems like the damage caused by drinking too much alcohol. But NAFLD is not caused by drinking alcohol. This sheet tells you more about NAFLD and how it can be managed.    How the liver works   The liver is an organ in the upper right side of the belly (abdomen). It has many important jobs. These include:  · Breaking down (metabolizing) proteins, carbohydrates, and fats  · Making a substance called bile that helps break down fats  · Storing and releasing sugar (glucose) into the blood to give the body energy  · Removing toxins from the blood  · Helping with blood clotting  Understanding NAFLD  A healthy liver may contain some fat. But if too much fat builds up in the liver, this causes NAFLD. NAFLD can be mild, causing fatty liver. Or it can be more severe and show inflammation, as well as the fat. This can cause non-alcoholic steatohepatitis (GRIMES).  · Fatty liver. With fatty liver, the liver simply has more fat than normal. This extra fat usually does not harm the liver.  · GRIMES. With GRIMES, the fatty liver becomes inflamed over time. GRIMES is serious because it can lead to scarring of the liver (fibrosis). Over time, the scarring may lead to cirrhosis of the liver. This can eventually cause liver failure or liver  cancer.  Causes and risk factors of NAFLD  Doctors don't know what causes NAFLD. But certain things make the problem more likely to happen. These include:  · Obesity  · Prediabetes or diabetes  · High levels of fat found in the blood (cholesterol and triglycerides)  · Being exposed to certain medicines   Symptoms of NAFLD  Most people with NAFLD have no symptoms. If symptoms do occur, they can include:  · Tiredness  · Weakness  · Weight loss  · Loss of appetite  · Nausea and vomiting  · Belly pain and cramping  · Yellowing of the skin and eyes (jaundice), as well as dark urine, or light-colored stools  · Swelling in the belly or legs  Diagnosing NAFLD  Your healthcare provider may think you have NAFLD if routine blood tests show high levels of liver enzymes. This may mean you have a liver problem. You may need one or more imaging tests, such as an ultrasound, CT, or MRI. You may need more blood tests to look for other causes of liver disease. You may also need a liver biopsy. During this test, a hollow needle is used to remove a tiny tissue sample from your liver. This tissue is then checked in a lab. This test can find signs of damage to liver tissue. It can also help figure out the cause of the damage and tell the difference between fatty liver and GRIMES.  Treating NAFLD  Treatment for NAFLD varies for each person. The best early treatment is to treat any underlying conditions causing metabolic syndrome. This is the name for a group of conditions that includes:  · High blood pressure  · High levels of cholesterol and triglycerides  · Being overweight or obese  · Diabetes  Your healthcare provider will monitor your health and treat any symptoms or underlying health problems you have. Your provider will also work with you to control your risk factors. This will make liver damage less likely. In fact, treating those underlying conditions can often improve liver disease. You may need to take certain medicines, but no  medicine will cure NAFLD. This is why treating the underlying conditions is most important. Your plan may include:  · Losing extra weight  · Getting regular exercise  · Controlling diabetes and high cholesterol or triglyceride levels  · Taking medicines and vitamins as prescribed by your provider  · Quitting smoking  · Not drinking alcohol  · Eating a healthy and balanced diet  Living with NAFLD  If NAFLD is caught early, it can be managed with treatment. Your healthcare provider will discuss further treatment choices with you as needed.  Be sure to ask your provider about recommended vaccines. These include vaccines for viruses that can cause liver disease.  Date Last Reviewed: 12/1/2016  © 8419-9398 [employer]. [address] [place] [address], [place] [address]. All rights reserved. This information is not intended as a substitute for professional medical care. Always follow your healthcare professional's instructions.

## 2019-07-03 NOTE — PROGRESS NOTES
[facility] Visit New Patient Note    REFERRING PROVIDER: Dr. Altagracia Kline    CHIEF COMPLAINT: elevated liver enzymes, liver lesion    HPI: This is a 43 y.o. White male referred for evaluation of elevated liver enzymes and liver lesion. Has had elevated transaminases since 7/2018, ALT > AST. Enzymes normal in 4/2018, no labs prior to this to review. Alk phos and Tbili nl. He had negative viral hepatitis panel, normal ASMA, ITZ. Ferritin elevated 434 with normal serum iron and iron sat. HH DNA analysis negative for C282Y and H63D genes. Ceruloplasmin and A1AT not tested.         U/s done 3/20/19 - liver enlarged 18.0 cm with steatosis. 2.2 x 2.0 x 1.7 cm area, ? Mass, spleen nl 9.1 cm    TPCT done 3/27/19 - hepatomegaly 18.6 cm with steatosis. 1.8 cm hyperdense nodule in the right lobe, indeterminate    Had MRI at outside facility, DIS. No AFP done.  - MRI 5/15/19 - 1.8 cm lesion in right lobe of liver, demonstrates uniform hyperintense contrast enhancement on most delayed postcontrast scans. ? Cavernous hemangioma vs HCC vs other, indeterminate    Denies previously known liver disease or abnormal serologies. GF with cancer, unknown type  ETOH: heavy drinker in his 20s but drinks socially  Herbal supplements or concerning medications: none  Has risk factors for NAFLD - overweight, HTN, HLD    No family history of liver disease. No personal history of autoimmune disease.      Today he feels well, no complaints. Denies symptoms of hepatic decompensation including jaundice, dark urine, hematemesis, melena, slowed mentation, abdominal distention, or lower extremity edema.     Lab Results   Component Value Date    ALT 85 (H) 06/12/2019    AST 50 (H) 06/12/2019    ALKPHOS 45 (L) 06/12/2019    BILITOT 0.8 06/12/2019    ALBUMIN 4.7 06/12/2019    INR 0.9 04/19/2018     05/22/2019       Review of patient's allergies indicates:  No Known Allergies      Current Outpatient Medications on File Prior to  Visit   Medication Sig Dispense Refill    amlodipine-benazepril (LOTREL) 10-40 mg per capsule Take 1 capsule by mouth once daily. 90 capsule 3    eplerenone (INSPRA) 25 MG Tab Take 1 tablet (25 mg total) by mouth once daily. 90 tablet 1    fenofibrate 160 MG Tab Take 1 tablet (160 mg total) by mouth once daily. 90 tablet 3    hydroCHLOROthiazide (HYDRODIURIL) 25 MG tablet Take 1 tablet (25 mg total) by mouth once daily. 90 tablet 3    ketoconazole (NIZORAL) 2 % cream Apply topically once daily. 60 g 3    tiZANidine (ZANAFLEX) 4 MG tablet Take 1 tablet (4 mg total) by mouth nightly as needed. Do not take with alcohol 25 tablet 0     No current facility-administered medications on file prior to visit.          PMHX:  has a past medical history of Hypertension and Hypertriglyceridemia (2018).    PSHX:  has no past surgical history on file.    FAMILY HISTORY: Negative for liver disease, reviewed in [employer].    SOCIAL HISTORY:   Social History     Tobacco Use   Smoking Status Former Smoker    Packs/day: 1.00    Years: 25.00    Pack years: 25.00    Start date:     Last attempt to quit: 2018    Years since quittin.2   Smokeless Tobacco Never Used       Social History     Substance and Sexual Activity   Alcohol Use Yes    Comment: socially       Social History     Substance and Sexual Activity   Drug Use No         ROS:   GENERAL: Denies fever, chills, weight loss/gain, fatigue  HEENT: Denies headaches, dizziness, vision/hearing changes  CARDIOVASCULAR: Denies chest pain, palpitations, or edema  RESPIRATORY: Denies dyspnea, cough  GI: Denies abdominal pain, rectal bleeding, nausea, vomiting. No change in bowel pattern or color  : Denies dysuria, hematuria   SKIN: Denies rash, itching   NEURO: Denies confusion, memory loss, or mood changes  PSYCH: Denies depression or anxiety  HEME/LYMPH: Denies easy bruising or bleeding  MSK: Denies joint pain or myalgia.     PHYSICAL EXAM:   Friendly White  "male, in no acute distress; alert and oriented to person, place and time  VITALS: BP (!) 146/92 (BP Location: Right arm, Patient Position: Sitting, BP Method: Medium (Automatic))   Pulse 87   Temp 97.4 °F (36.3 °C) (Oral)   Resp 18   Ht 5' 8" (1.727 m)   Wt 84.1 kg (185 lb 6.5 oz)   SpO2 97%   BMI 28.19 kg/m²    HENT: Normocephalic, without obvious abnormality. Oral mucosa pink and moist.   EYES: Sclerae anicteric.   NECK: Supple. No masses or cervical adenopathy.  CARDIOVASCULAR: Regular rate and rhythm. No murmurs.  RESPIRATORY: Normal respiratory effort. BBS CTA. No wheezes or crackles.  GI: Soft, non-tender, non-distended. No palpable hepatosplenomegaly. No masses palpable. No ascites.  EXTREMITIES:  No clubbing, cyanosis or edema.  SKIN: Warm and dry. No jaundice. No rashes noted to exposed skin. No telangectasias noted. No palmar erythema.  NEURO:  Normal gait. No asterixis.  PSYCH:  Memory intact. Thought and speech pattern appropriate. Behavior normal. No depression or anxiety noted.      LABS:  Lab Results   Component Value Date    WBC 7.69 05/22/2019    HGB 15.8 05/22/2019    HCT 48.5 05/22/2019     05/22/2019     06/12/2019    K 4.3 06/12/2019    CREATININE 1.6 (H) 06/12/2019    ALT 85 (H) 06/12/2019    AST 50 (H) 06/12/2019    ALKPHOS 45 (L) 06/12/2019    BILITOT 0.8 06/12/2019    ALBUMIN 4.7 06/12/2019    INR 0.9 04/19/2018    CHOL 251 (H) 12/05/2018    TRIG 343 (H) 12/05/2018    LDLCALC 141.4 12/05/2018    HGBA1C 5.4 04/19/2018       No results found for: HEPAIGG    Hepatitis B Surface Ag   Date Value Ref Range Status   03/20/2019 Negative  Final     No results found for: HEPBCAB  No results found for: HEPBSAB  Hepatitis C Ab   Date Value Ref Range Status   03/20/2019 Negative  Final     Immunization History   Administered Date(s) Administered    Influenza - Quadrivalent - PF 11/09/2018    Pneumococcal Conjugate - 13 Valent 04/20/2018          DIAGNOSTIC STUDIES:  ABD. U/S- " 3/20/19  FINDINGS:  Liver: Upper limits of normal in size, measuring 18.0 cm. Increased echotexture, parenchymal heterogeneity, and elevated hepatorenal index at 2.0.  Evaluation of the parenchyma is limited secondary to decreased ultrasound penetration.  2.2 x 2.0 x 1.7 cm focus of decreased echogenicity in the right hepatic lobe although this may represent focal fatty sparing, the location (which is not peripheral) is not typical and a hepatic mass cannot be excluded.    Gallbladder: There are multiple small echogenic foci, which could be stones or cholesterol clips crystals, in the gallbladder.  There is no gallbladder wall thickening, pericholecystic fluid, or sonographic Lopez sign.  The gallbladder is mildly contracted.    Biliary system: Common duct is not dilated, measuring 2 mm.  No intrahepatic ductal dilatation.    Spleen: Normal in size with a homogeneous echotexture, measuring 9.1 x 2.9 cm.    Pancreas: Obscured by overlying bowel gas.    Miscellaneous: No ascites.      Impression       1. Findings consistent with hepatic steatosis.  2. Possible hypoechoic mass in the right hepatic lobe.  Further evaluation with triple phase CT is recommended.  3. Cholelithiasis or cholesterol crystals in the gallbladder with nothing to strongly suggest acute cholecystitis.       CT- 3/27/19  FINDINGS:  In the chest no significant pleural or pericardial fluid.  The lung bases are clear.  No significant pulmonary nodules.    In the abdomen liver is enlarged 18.6 cm.  Diffuse decreased attenuation suggests fatty infiltration.  There is a 1.8 cm hyperdense nodule inferior right lobe, series 2, image 58.  No precontrast images submitted to determine if this is enhancing.  The lesion remains hyperdense on portal venous and delayed phase images.  No significant additional hepatic masses seen.  The high-density in the gallbladder though no stones.  The pancreas and spleen are normal.  No adrenal masses.  1.9 cm hypodensity  apex of the right kidney and 2 cm hypodensity left lower pole kidney most consistent with simple cyst.    Aorta tapers normally.  No significant para-aortic adenopathy.  In the pelvis no significant pelvic adenopathy.  Bladder and prostate unremarkable for age.  Bilateral fat containing inguinal hernias.    Evaluation of the bowel demonstrates no focal dilatation.  Significant amount of feces in the sigmoid colon extending to the rectum.  Appendix is unremarkable.  Mild thickening of the terminal ileum though no significant inflammatory changes.    Bone windows demonstrate no lytic or blastic lesions.  Well-defined hypodensity anterior body of L3 of doubtful significance.      Impression       Enlarged decreased attenuation of the liver suggesting hepatomegaly and hepatic steatosis.    There is a 1.8 cm hyperdense nodule in the right lobe.  No prior precontrast images are submitted.  Etiology is indeterminate.  The lesion remains hyperdense on all phases.  If further evaluation desired MRI liver protocol may be of benefit.    Hypodensities in the kidneys consistent with cysts.    Additional findings as above.       MRI- done at [facility], indeterminate      ASSESSMENT:  43 y.o. White male with:  1. Elevated liver enzymes, likely due to NAFLD  -- transaminases elevated, ALT > AST, normal in 2017 when weight was 15 lbs less  -- u/s shows hepatomegaly with fatty liver  -- serological workup was negative for hemochromatosis, autoimmune etiology, and viral hepatitis   -- no workup for Ez's, alpha-1 antitrypsin deficiency  -- ferritin elevated but likely d/t inflammation  2. NAFLD  -- transaminases mildly elevated   -- US shows hepatomegaly with fatty liver  -- synthetic liver function nl  -- risk factors for fatty liver include overweight, HTN, HLD  3. Elevated ferritin, suspect d/t inflammation secondary to fatty liver  -- normal serum iron and iron sat  -- HH DNA analysis negative for C282Y and H63D genes   4. Liver  mass, indeterminate on TPCT and MRI  -- check AFP  -- will review in IR conference      EDUCATION / DISCUSSION:   We discussed the manifestations of NAFLD. At this time there is no FDA approved therapy for NAFLD.   The patient and I discussed the importance of diet, exercise, and weight loss for management of NAFLD. We discussed a low fat, low carb/low sugar, high fiber diet, and a goal of 30 minutes of exercise 5 times per week.   Pt is aware that fatty liver can progress to steatohepatitis and possibly to cirrhosis, putting one at increased risk for liver cancer, liver failure, and death.      PLAN:  1. Labs today  2. Fibroscan today  3. Pt brought disc of MRI. Will upload and review scans in IR conference next week  4. Weight loss goal of 10-15 lbs  5. Low fat, low cholesterol, low carb, high fiber, high protein diet  6. Exercise, 5 days a week, 30 min a day  7. Recommend good control of cholesterol, blood pressure, blood sugar levels  8. Will check immunity markers for HBV/HAV and arrange for vaccination if needed  9. Follow up pending results    Thank you for allowing me to participate in the care of OMARI SilvermanP-C  [facility]     Addendum: Fibroscan today = kPa 5.0, F0-F1, no fibrosis. CAP = 364, S3 steatosis. Discussed results with pt. Await results of labs and IR conference review.

## 2019-07-03 NOTE — PROCEDURES
Vibration-controlled Transient Elastography Procedure (Fibroscan)    Name: Mark Rivera  Date of Procedure : 2019   :: Kierra Serra NP  Diagnosis: NAFLD    Probe: XL    Findings  Median liver stiffness score: 5.0 KPa  CAP readin dB/m    IQR/med: 14 %    Interpretation  Fibrosis interpretation is based on medial liver stiffness - Kilopascal (kPa).     Fibrosis stage: F 0-1     Steatosis interpretation is based on controlled attenuation parameter - (dB/m).    Steatosis grade: S3       Kierra Serra NP-DELONTE  Hepatology  [facility]

## 2019-07-05 ENCOUNTER — TELEPHONE (OUTPATIENT)
Dept: HEPATOLOGY | Facility: CLINIC | Age: 44
End: 2019-07-05

## 2019-07-05 NOTE — TELEPHONE ENCOUNTER
Patient: Mark Rivera       MRN: [id number]      : 1975     Age: 43 y.o.  [address] [place] [address]    Provider: JANICE - Kierra Serra NP    Urgency of review: non-urgent    Patient Transplant Status: Other hepatology pt    Reason for presentation: Indeterminate lesion    Clinical Summary: 44 yo with elevated liver enzymes, ? D/t NAFLD (workup in progress) with indeterminate lesion. Fibroscan = no fibrosis. AFP nl.    Imaging to be reviewed: u/s 3/20/19, TPCT 3/27/19, outside MRI 5/15/19 (disc to be uploaded)    Had MRI at outside facility, DIS.   - MRI 5/15/19 - 1.8 cm lesion in right lobe of liver, demonstrates uniform hyperintense contrast enhancement on most delayed postcontrast scans. ? Cavernous hemangioma vs HCC vs other, indeterminate    HCC Treatment History: n/a    ABO: O POS    Platelets:   Lab Results   Component Value Date/Time     2019 10:32 AM     Creatinine:   Lab Results   Component Value Date/Time    CREATININE 1.6 (H) 2019 02:45 PM     Bilirubin:   Lab Results   Component Value Date/Time    BILITOT 0.8 2019 04:00 PM     AFP Last 3 each if available:   Lab Results   Component Value Date/Time    AFP 3.6 2019 04:00 PM       MELD: Computed MELD-Na score unavailable. Necessary lab results were not found in the last year.  Computed MELD score unavailable. Necessary lab results were not found in the last year.    Plan:     Follow-up Provider:

## 2019-07-05 NOTE — TELEPHONE ENCOUNTER
MRI ABD DISC DATED 5/15/19 sent to the 2nd floor radiology to be loaded for 7/9/19 liver conference.

## 2019-07-09 ENCOUNTER — TELEPHONE (OUTPATIENT)
Dept: HEPATOLOGY | Facility: CLINIC | Age: 44
End: 2019-07-09

## 2019-07-09 DIAGNOSIS — R16.0 LIVER MASSES: ICD-10-CM

## 2019-07-09 NOTE — TELEPHONE ENCOUNTER
Called pt, notified of IR recs as below:    Plan:   Patient has steatosis.   1.8 cm enhancing lesion- no washout or pseudocapsule.  This remains indeterminate     6 month follow up MRI    Pt verbalized understanding of above    Please contact pt, schedule repeat MRI abdomen for 11/2019

## 2019-07-11 ENCOUNTER — TELEPHONE (OUTPATIENT)
Dept: HEPATOLOGY | Facility: CLINIC | Age: 44
End: 2019-07-11

## 2019-07-11 ENCOUNTER — PATIENT MESSAGE (OUTPATIENT)
Dept: HEPATOLOGY | Facility: CLINIC | Age: 44
End: 2019-07-11

## 2019-07-11 DIAGNOSIS — K76.0 NAFLD (NONALCOHOLIC FATTY LIVER DISEASE): Primary | ICD-10-CM

## 2019-07-11 NOTE — TELEPHONE ENCOUNTER
----- Message from Sosa Carrillo, Neftaly sent at 7/11/2019 12:32 PM CDT -----  Regarding: hep b  Good afternoon! Mr. Rivera's immunization is covered in the pharmacy at no charge. We were unable to reach the patient. Thank you!

## 2019-07-11 NOTE — TELEPHONE ENCOUNTER
Labs indicate no immunity to hepatitis B. vaccines ordered, sent to pharmacy. [facility]

## 2019-07-16 ENCOUNTER — TELEPHONE (OUTPATIENT)
Dept: HEPATOLOGY | Facility: CLINIC | Age: 44
End: 2019-07-16

## 2019-07-16 NOTE — TELEPHONE ENCOUNTER
Please schedule f/u appt with me one week after MRI in 1/2020. Tell him to check [facility] for messages regarding his hepatitis vaccines. They are covered in pharmacy for no charge.

## 2019-07-17 ENCOUNTER — TELEPHONE (OUTPATIENT)
Dept: FAMILY MEDICINE | Facility: CLINIC | Age: 44
End: 2019-07-17

## 2019-07-17 ENCOUNTER — CLINICAL SUPPORT (OUTPATIENT)
Dept: FAMILY MEDICINE | Facility: CLINIC | Age: 44
End: 2019-07-17
Payer: COMMERCIAL

## 2019-07-17 VITALS — SYSTOLIC BLOOD PRESSURE: 132 MMHG | DIASTOLIC BLOOD PRESSURE: 102 MMHG

## 2019-07-17 DIAGNOSIS — I10 ESSENTIAL HYPERTENSION: Primary | ICD-10-CM

## 2019-07-17 PROCEDURE — 99499 UNLISTED E&M SERVICE: CPT | Mod: S$GLB,,, | Performed by: FAMILY MEDICINE

## 2019-07-17 PROCEDURE — 99999 PR PBB SHADOW E&M-EST. PATIENT-LVL I: CPT | Mod: PBBFAC,,,

## 2019-07-17 PROCEDURE — 99499 NO LOS: ICD-10-PCS | Mod: S$GLB,,, | Performed by: FAMILY MEDICINE

## 2019-07-17 PROCEDURE — 99999 PR PBB SHADOW E&M-EST. PATIENT-LVL I: ICD-10-PCS | Mod: PBBFAC,,,

## 2019-07-17 NOTE — PROGRESS NOTES
Mark Rivera 43 y.o. male is here today for Blood Pressure check.   History of HTN yes.    Review of patient's allergies indicates:  No Known Allergies  Creatinine   Date Value Ref Range Status   06/12/2019 1.6 (H) 0.5 - 1.4 mg/dL Final     Sodium   Date Value Ref Range Status   06/12/2019 141 136 - 145 mmol/L Final     Potassium   Date Value Ref Range Status   06/12/2019 4.3 3.5 - 5.1 mmol/L Final   ]  Patient verifies taking blood pressure medications on a regular basis at the same time of the day.     Current Outpatient Medications:     amlodipine-benazepril (LOTREL) 10-40 mg per capsule, Take 1 capsule by mouth once daily., Disp: 90 capsule, Rfl: 3    eplerenone (INSPRA) 25 MG Tab, Take 1 tablet (25 mg total) by mouth once daily., Disp: 90 tablet, Rfl: 1    fenofibrate 160 MG Tab, Take 1 tablet (160 mg total) by mouth once daily., Disp: 90 tablet, Rfl: 3    hydroCHLOROthiazide (HYDRODIURIL) 25 MG tablet, Take 1 tablet (25 mg total) by mouth once daily., Disp: 90 tablet, Rfl: 3    ketoconazole (NIZORAL) 2 % cream, Apply topically once daily., Disp: 60 g, Rfl: 3    tiZANidine (ZANAFLEX) 4 MG tablet, Take 1 tablet (4 mg total) by mouth nightly as needed. Do not take with alcohol, Disp: 25 tablet, Rfl: 0  Does patient have record of home blood pressure readings no. Readings have been averaging no readings to report.   Last dose of blood pressure medication was taken at 9am 7/17/19.  Patient is asymptomatic.   Complains of no complaints.    Vitals:    07/17/19 1050 07/17/19 1106   BP: (!) 132/100 (!) 132/102   BP Location: Right arm Right arm   Patient Position: Sitting Sitting   BP Method: Large (Manual) Large (Manual)         Dr. Pearson informed of nurse visit.

## 2019-07-17 NOTE — TELEPHONE ENCOUNTER
Pt informed of recommendations; I did verify with pharmacy that he did pickup medications; pt already has OV scheduled with Dr ralph for 7/31

## 2019-07-17 NOTE — TELEPHONE ENCOUNTER
Was patient able to start his most recent medication the eplerenone?  Is he taking the other medications as well?  If so please have him take eplerenone twice a day and follow up in 2 weeks to have his blood pressure rechecked.  If he was not able to due to the expense please let me know and I will come up with an alternative medication.  Please note pt speaks [place] primarily.

## 2019-07-17 NOTE — TELEPHONE ENCOUNTER
Patient scheduled for a follow up appointment 1 week after MRI (1/9/2020). Appt is scheduled for Thurs 1/16/2020 at 8 am.   Appointment letter and message from Kierra Serra NP placed in the mail.  Will send a My Chart message

## 2019-07-31 ENCOUNTER — OFFICE VISIT (OUTPATIENT)
Dept: FAMILY MEDICINE | Facility: CLINIC | Age: 44
End: 2019-07-31
Payer: COMMERCIAL

## 2019-07-31 VITALS
WEIGHT: 184.31 LBS | HEIGHT: 68 IN | TEMPERATURE: 98 F | HEART RATE: 80 BPM | OXYGEN SATURATION: 96 % | BODY MASS INDEX: 27.93 KG/M2 | DIASTOLIC BLOOD PRESSURE: 74 MMHG | SYSTOLIC BLOOD PRESSURE: 118 MMHG

## 2019-07-31 DIAGNOSIS — I10 HYPERTENSION, WELL CONTROLLED: Primary | ICD-10-CM

## 2019-07-31 PROCEDURE — 99214 PR OFFICE/OUTPT VISIT, EST, LEVL IV, 30-39 MIN: ICD-10-PCS | Mod: S$GLB,,, | Performed by: FAMILY MEDICINE

## 2019-07-31 PROCEDURE — 3008F PR BODY MASS INDEX (BMI) DOCUMENTED: ICD-10-PCS | Mod: CPTII,S$GLB,, | Performed by: FAMILY MEDICINE

## 2019-07-31 PROCEDURE — 99999 PR PBB SHADOW E&M-EST. PATIENT-LVL III: ICD-10-PCS | Mod: PBBFAC,,, | Performed by: FAMILY MEDICINE

## 2019-07-31 PROCEDURE — 3078F PR MOST RECENT DIASTOLIC BLOOD PRESSURE < 80 MM HG: ICD-10-PCS | Mod: CPTII,S$GLB,, | Performed by: FAMILY MEDICINE

## 2019-07-31 PROCEDURE — 3074F SYST BP LT 130 MM HG: CPT | Mod: CPTII,S$GLB,, | Performed by: FAMILY MEDICINE

## 2019-07-31 PROCEDURE — 3008F BODY MASS INDEX DOCD: CPT | Mod: CPTII,S$GLB,, | Performed by: FAMILY MEDICINE

## 2019-07-31 PROCEDURE — 99999 PR PBB SHADOW E&M-EST. PATIENT-LVL III: CPT | Mod: PBBFAC,,, | Performed by: FAMILY MEDICINE

## 2019-07-31 PROCEDURE — 3078F DIAST BP <80 MM HG: CPT | Mod: CPTII,S$GLB,, | Performed by: FAMILY MEDICINE

## 2019-07-31 PROCEDURE — 99214 OFFICE O/P EST MOD 30 MIN: CPT | Mod: S$GLB,,, | Performed by: FAMILY MEDICINE

## 2019-07-31 PROCEDURE — 3074F PR MOST RECENT SYSTOLIC BLOOD PRESSURE < 130 MM HG: ICD-10-PCS | Mod: CPTII,S$GLB,, | Performed by: FAMILY MEDICINE

## 2019-07-31 RX ORDER — SPIRONOLACTONE 25 MG/1
25 TABLET ORAL 2 TIMES DAILY
Qty: 180 TABLET | Refills: 1 | Status: SHIPPED | OUTPATIENT
Start: 2019-07-31 | End: 2019-10-31 | Stop reason: SDUPTHER

## 2019-07-31 NOTE — PATIENT INSTRUCTIONS
Spironolactone tablets  What is this medicine?  SPIRONOLACTONE (daniel on oh LAK tone) is a diuretic. It helps you make more urine and to lose excess water from your body. This medicine is used to treat high blood pressure, and edema or swelling from heart, kidney, or liver disease. It is also used to treat patients who make too much aldosterone or have low potassium.  How should I use this medicine?  Take this medicine by mouth with a drink of water. Follow the directions on your prescription label. You can take it with or without food. If it upsets your stomach, take it with food. Do not take your medicine more often than directed. Remember that you will need to pass more urine after taking this medicine. Do not take your doses at a time of day that will cause you problems. Do not take at bedtime.  Talk to your pediatrician regarding the use of this medicine in children. While this drug may be prescribed for selected conditions, precautions do apply.  What side effects may I notice from receiving this medicine?  Side effects that you should report to your doctor or health care professional as soon as possible:  · allergic reactions such as skin rash or itching, hives, swelling of the lips, mouth, tongue, or throat  · black or tarry stools  · fast, irregular heartbeat  · fever  · muscle pain, cramps  · numbness, tingling in hands or feet  · trouble breathing  · trouble passing urine  · unusual bleeding  · unusually weak or tired  Side effects that usually do not require medical attention (report to your doctor or health care professional if they continue or are bothersome):  · change in voice or hair growth  · confusion  · dizzy, drowsy  · dry mouth, increased thirst  · enlarged or tender breasts  · headache  · irregular menstrual periods  · sexual difficulty, unable to have an erection  · stomach upset  What may interact with this medicine?  Do not take this medicine with any of the following  medications:  · eplerenone  This medicine may also interact with the following medications:  · corticosteroids  · digoxin  · lithium  · medicines for high blood pressure like ACE inhibitors  · skeletal muscle relaxants like tubocurarine  · NSAIDs, medicines for pain and inflammation, like ibuprofen or naproxen  · potassium products like salt substitute or supplements  · pressor amines like norepinephrine  · some diuretics  What if I miss a dose?  If you miss a dose, take it as soon as you can. If it is almost time for your next dose, take only that dose. Do not take double or extra doses.  Where should I keep my medicine?  Keep out of the reach of children.  Store below 25 degrees C (77 degrees F). Throw away any unused medicine after the expiration date.  What should I tell my health care provider before I take this medicine?  They need to know if you have any of these conditions:  · high blood level of potassium  · kidney disease or trouble making urine  · liver disease  · an unusual or allergic reaction to spironolactone, other medicines, foods, dyes, or preservatives  · pregnant or trying to get pregnant  · breast-feeding  What should I watch for while using this medicine?  Visit your doctor or health care professional for regular checks on your progress. Check your blood pressure as directed. Ask your doctor what your blood pressure should be, and when you should contact them.  You may need to be on a special diet while taking this medicine. Ask your doctor. Also, ask how many glasses of fluid you need to drink a day. You must not get dehydrated.  This medicine may make you feel confused, dizzy or lightheaded. Drinking alcohol and taking some medicines can make this worse. Do not drive, use machinery, or do anything that needs mental alertness until you know how this medicine affects you. Do not sit or stand up quickly.  NOTE:This sheet is a summary. It may not cover all possible information. If you have  questions about this medicine, talk to your doctor, pharmacist, or health care provider. Copyright© 2017 [employer]

## 2019-07-31 NOTE — PROGRESS NOTES
Subjective:       Patient ID: Mark Rivera is a 43 y.o. male.    Chief Complaint: Hypertension (6 week follow up)    HPI    HTN F/u:     Adherence with meds? Yes  Home BP range: 110s-130s/70-80s  Side effects: No, but epleronone cost prohibitive  Following a low salt diet ? No  Current exercise? Yes 2-3x per week.   Need of refills? No  Recent changes in blood pressure medication: Yes increased eplerenone to BID  Patient has been in pain or taking decongestants/anti-inflammatory/OCP/SSRI medication: no  Patient has other symptoms (headache, weakness, Cp, SOb, palpitations).         Current Outpatient Medications on File Prior to Visit   Medication Sig Dispense Refill    amlodipine-benazepril (LOTREL) 10-40 mg per capsule Take 1 capsule by mouth once daily. 90 capsule 3    fenofibrate 160 MG Tab Take 1 tablet (160 mg total) by mouth once daily. 90 tablet 3    hydroCHLOROthiazide (HYDRODIURIL) 25 MG tablet Take 1 tablet (25 mg total) by mouth once daily. 90 tablet 3    ketoconazole (NIZORAL) 2 % cream Apply topically once daily. 60 g 3    tiZANidine (ZANAFLEX) 4 MG tablet Take 1 tablet (4 mg total) by mouth nightly as needed. Do not take with alcohol 25 tablet 0    [DISCONTINUED] eplerenone (INSPRA) 25 MG Tab Take 1 tablet (25 mg total) by mouth once daily. 90 tablet 1     No current facility-administered medications on file prior to visit.        Past Medical History:   Diagnosis Date    Hypertension     Hypertriglyceridemia 8/30/2018       Family History   Problem Relation Age of Onset    Diabetes Mother     Hypertension Father     Cirrhosis Neg Hx         reports that he quit smoking about 15 months ago. He started smoking about 30 years ago. He has a 25.00 pack-year smoking history. He has never used smokeless tobacco. He reports that he drinks alcohol. He reports that he does not use drugs.    Review of Systems  see hpi  Objective:     Vitals:    07/31/19 1133   BP: 118/74   Pulse: 80   Temp: 98 °F (36.7  °C)        Physical Exam   Constitutional: He appears well-developed. No distress.   o   HENT:   Head: Normocephalic and atraumatic.   Eyes: Conjunctivae and EOM are normal.   Cardiovascular: Normal rate and regular rhythm. Exam reveals no gallop and no friction rub.   No murmur heard.  Pulmonary/Chest: Effort normal and breath sounds normal. No respiratory distress. He has no wheezes. He has no rales. He exhibits no tenderness.   Musculoskeletal: He exhibits no edema.   No gross extremity deformity   Neurological: He is alert.   Psychiatric: He has a normal mood and affect. His behavior is normal.   Nursing note and vitals reviewed.      Assessment:       1. Hypertension, well controlled        Plan:       Mark was seen today for hypertension.    Diagnoses and all orders for this visit:    Hypertension, well controlled  -     spironolactone (ALDACTONE) 25 MG tablet; Take 1 tablet (25 mg total) by mouth 2 (two) times daily. For high blood pressure  Patient's blood pressure is well controlled on current regimen with plantar known but a flare known is very cost prohibitive for him so I will switch it to spironolactone but gave him a warning about gynecomastia as a potential side effect.  He will notify me immediately if this becomes an issue with medicine will be discontinued.            Follow up in about 3 months (around 10/31/2019) for Hypertension - Rosie.        Pt verbalized understanding and agreed with our plan.

## 2019-08-01 ENCOUNTER — OFFICE VISIT (OUTPATIENT)
Dept: HEMATOLOGY/ONCOLOGY | Facility: CLINIC | Age: 44
End: 2019-08-01
Payer: COMMERCIAL

## 2019-08-01 VITALS
HEART RATE: 97 BPM | BODY MASS INDEX: 27.57 KG/M2 | WEIGHT: 181.88 LBS | SYSTOLIC BLOOD PRESSURE: 127 MMHG | TEMPERATURE: 98 F | OXYGEN SATURATION: 97 % | HEIGHT: 68 IN | DIASTOLIC BLOOD PRESSURE: 84 MMHG

## 2019-08-01 DIAGNOSIS — R79.89 ELEVATED FERRITIN: Primary | ICD-10-CM

## 2019-08-01 DIAGNOSIS — K76.0 NAFLD (NONALCOHOLIC FATTY LIVER DISEASE): ICD-10-CM

## 2019-08-01 DIAGNOSIS — R93.2 ABNORMAL MRI, LIVER: ICD-10-CM

## 2019-08-01 PROCEDURE — 99999 PR PBB SHADOW E&M-EST. PATIENT-LVL III: CPT | Mod: PBBFAC,,, | Performed by: INTERNAL MEDICINE

## 2019-08-01 PROCEDURE — 99999 PR PBB SHADOW E&M-EST. PATIENT-LVL III: ICD-10-PCS | Mod: PBBFAC,,, | Performed by: INTERNAL MEDICINE

## 2019-08-01 PROCEDURE — 3079F DIAST BP 80-89 MM HG: CPT | Mod: CPTII,S$GLB,, | Performed by: INTERNAL MEDICINE

## 2019-08-01 PROCEDURE — 3079F PR MOST RECENT DIASTOLIC BLOOD PRESSURE 80-89 MM HG: ICD-10-PCS | Mod: CPTII,S$GLB,, | Performed by: INTERNAL MEDICINE

## 2019-08-01 PROCEDURE — 3074F SYST BP LT 130 MM HG: CPT | Mod: CPTII,S$GLB,, | Performed by: INTERNAL MEDICINE

## 2019-08-01 PROCEDURE — 3074F PR MOST RECENT SYSTOLIC BLOOD PRESSURE < 130 MM HG: ICD-10-PCS | Mod: CPTII,S$GLB,, | Performed by: INTERNAL MEDICINE

## 2019-08-01 PROCEDURE — 3008F BODY MASS INDEX DOCD: CPT | Mod: CPTII,S$GLB,, | Performed by: INTERNAL MEDICINE

## 2019-08-01 PROCEDURE — 99213 OFFICE O/P EST LOW 20 MIN: CPT | Mod: S$GLB,,, | Performed by: INTERNAL MEDICINE

## 2019-08-01 PROCEDURE — 3008F PR BODY MASS INDEX (BMI) DOCUMENTED: ICD-10-PCS | Mod: CPTII,S$GLB,, | Performed by: INTERNAL MEDICINE

## 2019-08-01 PROCEDURE — 99213 PR OFFICE/OUTPT VISIT, EST, LEVL III, 20-29 MIN: ICD-10-PCS | Mod: S$GLB,,, | Performed by: INTERNAL MEDICINE

## 2019-08-01 NOTE — PROGRESS NOTES
Subjective:       Patient ID: Mark Rivera is a 43 y.o. male.    Chief Complaint: Follow-up    HPI     Diagnosis;: Elevated Ferritin        Pt is 43-year-old limited English speaking male with history of hypertension, hypertriglyceridemia, CKD here today for follow-up for elevated ferritin.. Pt accompanied by friend.    Patient denies any prior history of abnormal iron studies.  No history of diabetes mellitus.  No family history of diabetes mellitus.  Patient denies history of liver disease.  No history of hepatitis.  Appetite and weight stable.   No fatigue.  He is a former smoker.  He denies history of ETOH abuse.  He reports he was a heavy drinker and his 20s but rarely drinks in the past 5 years. CT a/p w/contrast 3/27/2019 reveals enlarged decreased attenuation of the liver suggesting hepatomegaly and hepatic steatosis. There is a 1.8 cm hyperdense nodule in the right.  He underwent further evaluation with MRI imaging which was performed at an outside facility.  No report available at time at today's visit.  CBC from 5/222019 reveals a white blood cell count of 7 690/III hemoglobin of 15.8 grams/deciliter hematocrit of 40.5% MCV of 92 and a platelet count of 277 K. Iron studies reveal a serum iron of 103 TIBC of 457 iron sats of 20 for in a ferritin of 434.      He had negative viral hepatitis panel, normal ASMA, ITZ. Ferritin elevated 434 with normal serum iron and iron sat.   HH DNA analysis negative for C282Y and H63D genes.   AFP 3.6ng/ml       Pt followed closely by Hepatology for NAFLD  MRI 7/10/2019  reveals 1.8 cm enhancing lesion- no washout or pseudocapsule.  This remains indeterminate  Follow-up imaging planned 6mos    Today, pt is doing well  No new issues  Appetite   Wt loss 3 lbs past month ( modified diet)            Past Medical History:   Diagnosis Date    Hypertension     Hypertriglyceridemia 8/30/2018         No past surgical history on file.     Social History     Tobacco Use    Smoking  "status: Former Smoker     Packs/day: 1.00     Years: 25.00     Pack years: 25.00     Start date:      Last attempt to quit: 2018     Years since quittin.2    Smokeless tobacco: Never Used   Substance Use Topics    Alcohol use: Yes     Comment: socially    Drug use: No     Comment: remote h/o cocaine use in 's       Review of Systems   Constitutional: Negative for appetite change, fatigue, fever and unexpected weight change.   HENT: Negative for mouth sores.    Eyes: Negative for visual disturbance.   Respiratory: Negative for cough and shortness of breath.    Cardiovascular: Negative for chest pain.   Gastrointestinal: Negative for abdominal pain and diarrhea.   Genitourinary: Negative for frequency.   Musculoskeletal: Negative for back pain.   Skin: Negative for rash.   Neurological: Negative for headaches.   Hematological: Negative for adenopathy.   Psychiatric/Behavioral: The patient is not nervous/anxious.        Objective:        Vitals:    19 1323   BP: 127/84   BP Location: Right arm   Patient Position: Sitting   BP Method: Medium (Automatic)   Pulse: 97   Temp: 98 °F (36.7 °C)   TempSrc: Oral   SpO2: 97%   Weight: 82.5 kg (181 lb 14.1 oz)   Height: 5' 8" (1.727 m)       Physical Exam   Constitutional: He is oriented to person, place, and time. He appears well-developed and well-nourished.   HENT:   Head: Normocephalic.   Mouth/Throat: Oropharynx is clear and moist. No oropharyngeal exudate.   Eyes: Pupils are equal, round, and reactive to light. Conjunctivae are normal. No scleral icterus.   Neck: Normal range of motion. Neck supple. No thyromegaly present.   Cardiovascular: Normal rate, regular rhythm and normal heart sounds.   No murmur heard.  Pulmonary/Chest: Effort normal and breath sounds normal. He has no wheezes. He has no rales.   Abdominal: Soft. Bowel sounds are normal. He exhibits no distension and no mass. There is no hepatosplenomegaly. There is no tenderness. There is " no rebound and no guarding.   Musculoskeletal: Normal range of motion. He exhibits no edema.   Lymphadenopathy:     He has no cervical adenopathy.     He has no axillary adenopathy.        Right: No supraclavicular adenopathy present.        Left: No supraclavicular adenopathy present.   Neurological: He is alert and oriented to person, place, and time. No cranial nerve deficit.   Skin: No rash noted. No erythema.   Psychiatric: He has a normal mood and affect.         CT a/p w/contrast 3/27/2019   Enlarged decreased attenuation of the liver suggesting hepatomegaly and hepatic steatosis.    There is a 1.8 cm hyperdense nodule in the right lobe.  No prior precontrast images are submitted.  Etiology is indeterminate.  The lesion remains hyperdense on all phases.  If further evaluation desired MRI liver protocol may be of benefit.    Hypodensities in the kidneys consistent with cysts.    Additional findings as above.    Lab Results   Component Value Date    WBC 7.69 05/22/2019    HGB 15.8 05/22/2019    HCT 48.5 05/22/2019    MCV 92 05/22/2019     05/22/2019     Lab Results   Component Value Date    IRON 103 05/22/2019    TIBC 457 (H) 05/22/2019    FERRITIN 434 (H) 05/22/2019         Assessment:       1. Elevated ferritin    2. Abnormal MRI, liver    3. NAFLD (nonalcoholic fatty liver disease)        Plan:       Patient is a 43-year-old with abnormal CT imaging revealing  suggesting hepatomegaly and hepatic steatosis and an indeterminate 1.8 cm hyperdense nodule  Right lobe of liver .    Patient also with elevated transaminases.    Iron studies revealed a mildly elevated ferritin with normal iron sats  Elevated ferritin likely inflammatory    MHe had negative viral hepatitis panel, normal ASMA, ITZ. Ferritin elevated 434 with normal serum iron and iron sat.   HH DNA analysis negative for C282Y and H63D genes.    AFP 3.6ng/ml   MRI outside facility 7/10/2019  Patient has steatosis. 1.8 cm enhancing lesion- no  washout or pseudocapsule.  This remains indeterminate     Pt will continue follow-up with Hepatology   6 month follow up MRI planned per Hepatology    Follow-up prn       All questions posed answered to patient's satisfaction Follow up in 2 months    :cc:  Ronald Pearson MD

## 2019-08-28 ENCOUNTER — TELEPHONE (OUTPATIENT)
Dept: FAMILY MEDICINE | Facility: CLINIC | Age: 44
End: 2019-08-28

## 2019-08-28 NOTE — TELEPHONE ENCOUNTER
Pharmacy calling to clarify what medication pt should be taking; according to epic spironolactone is still listed and eplerenone shows discontinued; please advise

## 2019-08-28 NOTE — TELEPHONE ENCOUNTER
----- Message from Leny Morgan sent at 8/28/2019  2:08 PM CDT -----  Contact: [facility]  Type:  Pharmacy Calling to Clarify an RX    Name of Caller: Carmine     Pharmacy Name: [facility] [phone]    Prescription Name: spironolactone (ALDACTONE) 25 MG tablet  eplerenone (INSPRA) 25 MG Tab     What do they need to clarify? Pt is confused on what medication he should be on.    Best Call Back Number: [phone]

## 2019-09-18 DIAGNOSIS — E78.1 HYPERTRIGLYCERIDEMIA: ICD-10-CM

## 2019-09-18 RX ORDER — FENOFIBRATE 160 MG/1
TABLET ORAL
Qty: 90 TABLET | Refills: 0 | OUTPATIENT
Start: 2019-09-18

## 2019-09-19 DIAGNOSIS — E78.1 HYPERTRIGLYCERIDEMIA: ICD-10-CM

## 2019-09-19 RX ORDER — FENOFIBRATE 160 MG/1
TABLET ORAL
Qty: 90 TABLET | Refills: 0 | OUTPATIENT
Start: 2019-09-19

## 2019-10-31 ENCOUNTER — OFFICE VISIT (OUTPATIENT)
Dept: FAMILY MEDICINE | Facility: CLINIC | Age: 44
End: 2019-10-31
Payer: COMMERCIAL

## 2019-10-31 VITALS
TEMPERATURE: 98 F | HEART RATE: 86 BPM | HEIGHT: 68 IN | OXYGEN SATURATION: 98 % | RESPIRATION RATE: 20 BRPM | DIASTOLIC BLOOD PRESSURE: 88 MMHG | BODY MASS INDEX: 28.49 KG/M2 | SYSTOLIC BLOOD PRESSURE: 122 MMHG | WEIGHT: 188 LBS

## 2019-10-31 DIAGNOSIS — E78.1 HYPERTRIGLYCERIDEMIA: ICD-10-CM

## 2019-10-31 DIAGNOSIS — I10 HYPERTENSION, WELL CONTROLLED: Primary | ICD-10-CM

## 2019-10-31 DIAGNOSIS — F10.11 H/O ALCOHOL ABUSE: ICD-10-CM

## 2019-10-31 DIAGNOSIS — R16.0 LIVER MASS: ICD-10-CM

## 2019-10-31 PROCEDURE — 99214 OFFICE O/P EST MOD 30 MIN: CPT | Mod: S$GLB,,, | Performed by: FAMILY MEDICINE

## 2019-10-31 PROCEDURE — 3008F PR BODY MASS INDEX (BMI) DOCUMENTED: ICD-10-PCS | Mod: CPTII,S$GLB,, | Performed by: FAMILY MEDICINE

## 2019-10-31 PROCEDURE — 99214 PR OFFICE/OUTPT VISIT, EST, LEVL IV, 30-39 MIN: ICD-10-PCS | Mod: S$GLB,,, | Performed by: FAMILY MEDICINE

## 2019-10-31 PROCEDURE — 3079F DIAST BP 80-89 MM HG: CPT | Mod: CPTII,S$GLB,, | Performed by: FAMILY MEDICINE

## 2019-10-31 PROCEDURE — 3074F SYST BP LT 130 MM HG: CPT | Mod: CPTII,S$GLB,, | Performed by: FAMILY MEDICINE

## 2019-10-31 PROCEDURE — 3079F PR MOST RECENT DIASTOLIC BLOOD PRESSURE 80-89 MM HG: ICD-10-PCS | Mod: CPTII,S$GLB,, | Performed by: FAMILY MEDICINE

## 2019-10-31 PROCEDURE — 99999 PR PBB SHADOW E&M-EST. PATIENT-LVL III: CPT | Mod: PBBFAC,,, | Performed by: FAMILY MEDICINE

## 2019-10-31 PROCEDURE — 99999 PR PBB SHADOW E&M-EST. PATIENT-LVL III: ICD-10-PCS | Mod: PBBFAC,,, | Performed by: FAMILY MEDICINE

## 2019-10-31 PROCEDURE — 3008F BODY MASS INDEX DOCD: CPT | Mod: CPTII,S$GLB,, | Performed by: FAMILY MEDICINE

## 2019-10-31 PROCEDURE — 3074F PR MOST RECENT SYSTOLIC BLOOD PRESSURE < 130 MM HG: ICD-10-PCS | Mod: CPTII,S$GLB,, | Performed by: FAMILY MEDICINE

## 2019-10-31 RX ORDER — HYDROCHLOROTHIAZIDE 25 MG/1
25 TABLET ORAL DAILY
Qty: 90 TABLET | Refills: 1 | Status: SHIPPED | OUTPATIENT
Start: 2019-10-31 | End: 2020-03-26 | Stop reason: ALTCHOICE

## 2019-10-31 RX ORDER — FENOFIBRATE 160 MG/1
160 TABLET ORAL DAILY
Qty: 90 TABLET | Refills: 1 | Status: SHIPPED | OUTPATIENT
Start: 2019-10-31 | End: 2020-06-11 | Stop reason: ALTCHOICE

## 2019-10-31 RX ORDER — AMLODIPINE AND BENAZEPRIL HYDROCHLORIDE 10; 40 MG/1; MG/1
1 CAPSULE ORAL DAILY
Qty: 90 CAPSULE | Refills: 1 | Status: SHIPPED | OUTPATIENT
Start: 2019-10-31 | End: 2021-01-08 | Stop reason: SDUPTHER

## 2019-10-31 RX ORDER — SPIRONOLACTONE 25 MG/1
25 TABLET ORAL 2 TIMES DAILY
Qty: 180 TABLET | Refills: 1 | Status: SHIPPED | OUTPATIENT
Start: 2019-10-31 | End: 2020-03-26 | Stop reason: ALTCHOICE

## 2019-10-31 NOTE — PROGRESS NOTES
Subjective:       Patient ID: Mark Rivera is a 44 y.o. male.    Chief Complaint: Hyperlipidemia and Hypertension      HPI  44-year-old male presents for establishing care.  Patient history of alcohol use.  Patient states he drinks occasionally and does not drink daily any more.  Had elevated LFTs.  Has a history of hepatic steatosis.  Follows with Heme-Onc and hepatology.  Last MRI showed patient had a hepatic lobe mass.  Would like a refill on his blood pressure medications.  Blood pressure today is 122/88.      Review of Systems   Constitutional: Negative.    HENT: Negative.    Respiratory: Negative.    Cardiovascular: Negative.    Gastrointestinal: Negative.    Endocrine: Negative.    Genitourinary: Negative.    Musculoskeletal: Negative.    Neurological: Negative.    Psychiatric/Behavioral: Negative.           Past Medical History:   Diagnosis Date    Hypertension     Hypertriglyceridemia 2018     No past surgical history on file.  Family History   Problem Relation Age of Onset    Diabetes Mother     Hypertension Father     Cirrhosis Neg Hx      Social History     Socioeconomic History    Marital status: Single     Spouse name: Not on file    Number of children: Not on file    Years of education: Not on file    Highest education level: Not on file   Occupational History    Not on file   Social Needs    Financial resource strain: Not on file    Food insecurity:     Worry: Not on file     Inability: Not on file    Transportation needs:     Medical: Not on file     Non-medical: Not on file   Tobacco Use    Smoking status: Former Smoker     Packs/day: 1.00     Years: 25.00     Pack years: 25.00     Start date:      Last attempt to quit: 2018     Years since quittin.5    Smokeless tobacco: Never Used   Substance and Sexual Activity    Alcohol use: Yes     Comment: socially    Drug use: No     Comment: remote h/o cocaine use in 20's    Sexual activity: Not Currently     Comment:  "7/18/18 single   Lifestyle    Physical activity:     Days per week: Not on file     Minutes per session: Not on file    Stress: Not on file   Relationships    Social connections:     Talks on phone: Not on file     Gets together: Not on file     Attends [employer] service: Not on file     Active member of club or organization: Not on file     Attends meetings of clubs or organizations: Not on file     Relationship status: Not on file   Other Topics Concern    Not on file   Social History Narrative    Not on file       Current Outpatient Medications:     amlodipine-benazepril (LOTREL) 10-40 mg per capsule, Take 1 capsule by mouth once daily., Disp: 90 capsule, Rfl: 1    fenofibrate 160 MG Tab, Take 1 tablet (160 mg total) by mouth once daily., Disp: 90 tablet, Rfl: 1    hydroCHLOROthiazide (HYDRODIURIL) 25 MG tablet, Take 1 tablet (25 mg total) by mouth once daily., Disp: 90 tablet, Rfl: 1    ketoconazole (NIZORAL) 2 % cream, Apply topically once daily., Disp: 60 g, Rfl: 3    spironolactone (ALDACTONE) 25 MG tablet, Take 1 tablet (25 mg total) by mouth 2 (two) times daily. For high blood pressure, Disp: 180 tablet, Rfl: 1    tiZANidine (ZANAFLEX) 4 MG tablet, Take 1 tablet (4 mg total) by mouth nightly as needed. Do not take with alcohol, Disp: 25 tablet, Rfl: 0   Objective:      Vitals:    10/31/19 1328   BP: 122/88   BP Location: Left arm   Patient Position: Sitting   BP Method: Large (Manual)   Pulse: 86   Resp: 20   Temp: 98.2 °F (36.8 °C)   TempSrc: Oral   SpO2: 98%   Weight: 85.3 kg (188 lb)   Height: 5' 8" (1.727 m)       Physical Exam   Constitutional: He is oriented to person, place, and time. No distress.   HENT:   Head: Normocephalic and atraumatic.   Eyes: Conjunctivae are normal.   Neck: Neck supple.   Cardiovascular: Normal rate, regular rhythm and normal heart sounds. Exam reveals no gallop and no friction rub.   No murmur heard.  Pulmonary/Chest: Effort normal and breath sounds normal. He " has no wheezes. He has no rales.   Neurological: He is alert and oriented to person, place, and time.   Skin: Skin is warm and dry.   Psychiatric: He has a normal mood and affect. His behavior is normal. Judgment and thought content normal.          Assessment:       1. Hypertension, well controlled    2. Liver mass    3. H/O alcohol abuse    4. Hypertriglyceridemia        Plan:       Hypertension, well controlled  -     hydroCHLOROthiazide (HYDRODIURIL) 25 MG tablet; Take 1 tablet (25 mg total) by mouth once daily.  Dispense: 90 tablet; Refill: 1  -     spironolactone (ALDACTONE) 25 MG tablet; Take 1 tablet (25 mg total) by mouth 2 (two) times daily. For high blood pressure  Dispense: 180 tablet; Refill: 1  -     amlodipine-benazepril (LOTREL) 10-40 mg per capsule; Take 1 capsule by mouth once daily.  Dispense: 90 capsule; Refill: 1    Liver mass  -     Comprehensive metabolic panel; Future; Expected date: 10/31/2019    H/O alcohol abuse  -     Comprehensive metabolic panel; Future; Expected date: 10/31/2019    Hypertriglyceridemia  -     fenofibrate 160 MG Tab; Take 1 tablet (160 mg total) by mouth once daily.  Dispense: 90 tablet; Refill: 1  -     Lipid panel; Future; Expected date: 10/31/2019    has a MRI scheduled in 1/2019. Cont f/u with heme/onc and hepatology    Follow up in about 3 months (around 1/31/2020).            Vj Velez MD

## 2019-12-03 DIAGNOSIS — I10 HYPERTENSION, UNCONTROLLED: ICD-10-CM

## 2019-12-03 RX ORDER — EPLERENONE 25 MG/1
25 TABLET, FILM COATED ORAL DAILY
Qty: 90 TABLET | Refills: 1 | Status: CANCELLED | OUTPATIENT
Start: 2019-12-03 | End: 2020-12-02

## 2020-01-09 ENCOUNTER — HOSPITAL ENCOUNTER (OUTPATIENT)
Dept: RADIOLOGY | Facility: HOSPITAL | Age: 45
Discharge: HOME | End: 2020-01-09
Attending: NURSE PRACTITIONER
Payer: COMMERCIAL

## 2020-01-09 ENCOUNTER — TELEPHONE (OUTPATIENT)
Dept: HEPATOLOGY | Facility: CLINIC | Age: 45
End: 2020-01-09

## 2020-01-09 DIAGNOSIS — R16.0 LIVER MASSES: ICD-10-CM

## 2020-01-09 PROCEDURE — A9585 GADOBUTROL INJECTION: HCPCS | Performed by: NURSE PRACTITIONER

## 2020-01-09 PROCEDURE — 74183 MRI ABDOMEN W WO CONTRAST: ICD-10-PCS | Mod: 26,,, | Performed by: RADIOLOGY

## 2020-01-09 PROCEDURE — 74183 MRI ABD W/O CNTR FLWD CNTR: CPT | Mod: 26,,, | Performed by: RADIOLOGY

## 2020-01-09 PROCEDURE — 74183 MRI ABD W/O CNTR FLWD CNTR: CPT | Mod: TC

## 2020-01-09 PROCEDURE — 25500020 PHARM REV CODE 255: Performed by: NURSE PRACTITIONER

## 2020-01-09 RX ORDER — GADOBUTROL 604.72 MG/ML
10 INJECTION INTRAVENOUS
Status: COMPLETED | OUTPATIENT
Start: 2020-01-09 | End: 2020-01-09

## 2020-01-09 RX ADMIN — GADOBUTROL 10 ML: 604.72 INJECTION INTRAVENOUS at 11:01

## 2020-01-09 NOTE — TELEPHONE ENCOUNTER
Patient: Mark Rivera       MRN: [id number]      : 1975     Age: 44 y.o.  [address] [place] [address]    Provider: JANICE - SHELTON Roach previously followed by Kierra     Urgency of review: non-urgent    Patient Transplant Status: Other hepatology pt    Reason for presentation: Reassessment    Clinical Summary: 42 yo with elevated liver enzymes, ? D/t NAFLD with indeterminate lesion. Fibroscan = no fibrosis. AFP nl.    Imaging reviwed at IR 2019 with lesion indeterminate, plan was for repeat MRI in 6 months, done       Imaging to be reviewed: u/s 3/20/19, TPCT 3/27/19, outside MRI 5/15/19 (disc to be uploaded)    Had MRI at outside facility, DIS.   - MRI 5/15/19 - 1.8 cm lesion in right lobe of liver, demonstrates uniform hyperintense contrast enhancement on most delayed postcontrast scans. ? Cavernous hemangioma vs HCC vs other, indeterminate    MRI: 2020- Nonspecific 2.5 x 1.9 cm T2 hypointense/T1 hyperintense lesion in the right hepatic lobe which does not demonstrate contrast enhancement, washout, or pseudo capsule    HCC Treatment History: n/a    ABO: O POS    Platelets:   Lab Results   Component Value Date/Time     2019 10:32 AM     Creatinine:   Lab Results   Component Value Date/Time    CREATININE 1.6 (H) 2019 02:45 PM     Bilirubin:   Lab Results   Component Value Date/Time    BILITOT 0.8 2019 04:00 PM     AFP Last 3 each if available:   Lab Results   Component Value Date/Time    AFP 3.6 2019 04:00 PM       MELD: Computed MELD-Na score unavailable. Necessary lab results were not found in the last year.  Computed MELD score unavailable. Necessary lab results were not found in the last year.    Plan:     Follow-up Provider:

## 2020-01-15 ENCOUNTER — PATIENT OUTREACH (OUTPATIENT)
Dept: ADMINISTRATIVE | Facility: OTHER | Age: 45
End: 2020-01-15

## 2020-01-16 ENCOUNTER — OFFICE VISIT (OUTPATIENT)
Dept: HEPATOLOGY | Facility: CLINIC | Age: 45
End: 2020-01-16
Payer: COMMERCIAL

## 2020-01-16 VITALS
DIASTOLIC BLOOD PRESSURE: 111 MMHG | RESPIRATION RATE: 18 BRPM | BODY MASS INDEX: 29.38 KG/M2 | OXYGEN SATURATION: 96 % | HEIGHT: 67 IN | SYSTOLIC BLOOD PRESSURE: 137 MMHG | WEIGHT: 187.19 LBS | HEART RATE: 79 BPM

## 2020-01-16 DIAGNOSIS — R16.0 LIVER MASSES: ICD-10-CM

## 2020-01-16 DIAGNOSIS — I10 ESSENTIAL HYPERTENSION: ICD-10-CM

## 2020-01-16 DIAGNOSIS — R74.8 ELEVATED LIVER ENZYMES: ICD-10-CM

## 2020-01-16 DIAGNOSIS — R79.89 ELEVATED FERRITIN: ICD-10-CM

## 2020-01-16 DIAGNOSIS — K76.0 NAFLD (NONALCOHOLIC FATTY LIVER DISEASE): Primary | ICD-10-CM

## 2020-01-16 DIAGNOSIS — N18.30 STAGE 3 CHRONIC KIDNEY DISEASE: ICD-10-CM

## 2020-01-16 PROCEDURE — 3080F DIAST BP >= 90 MM HG: CPT | Mod: CPTII,S$GLB,, | Performed by: PHYSICIAN ASSISTANT

## 2020-01-16 PROCEDURE — 3075F SYST BP GE 130 - 139MM HG: CPT | Mod: CPTII,S$GLB,, | Performed by: PHYSICIAN ASSISTANT

## 2020-01-16 PROCEDURE — 3008F PR BODY MASS INDEX (BMI) DOCUMENTED: ICD-10-PCS | Mod: CPTII,S$GLB,, | Performed by: PHYSICIAN ASSISTANT

## 2020-01-16 PROCEDURE — 3080F PR MOST RECENT DIASTOLIC BLOOD PRESSURE >= 90 MM HG: ICD-10-PCS | Mod: CPTII,S$GLB,, | Performed by: PHYSICIAN ASSISTANT

## 2020-01-16 PROCEDURE — 3075F PR MOST RECENT SYSTOLIC BLOOD PRESS GE 130-139MM HG: ICD-10-PCS | Mod: CPTII,S$GLB,, | Performed by: PHYSICIAN ASSISTANT

## 2020-01-16 PROCEDURE — 99999 PR PBB SHADOW E&M-EST. PATIENT-LVL IV: CPT | Mod: PBBFAC,,, | Performed by: PHYSICIAN ASSISTANT

## 2020-01-16 PROCEDURE — 99999 PR PBB SHADOW E&M-EST. PATIENT-LVL IV: ICD-10-PCS | Mod: PBBFAC,,, | Performed by: PHYSICIAN ASSISTANT

## 2020-01-16 PROCEDURE — 3008F BODY MASS INDEX DOCD: CPT | Mod: CPTII,S$GLB,, | Performed by: PHYSICIAN ASSISTANT

## 2020-01-16 PROCEDURE — 99214 PR OFFICE/OUTPT VISIT, EST, LEVL IV, 30-39 MIN: ICD-10-PCS | Mod: S$GLB,,, | Performed by: PHYSICIAN ASSISTANT

## 2020-01-16 PROCEDURE — 99214 OFFICE O/P EST MOD 30 MIN: CPT | Mod: S$GLB,,, | Performed by: PHYSICIAN ASSISTANT

## 2020-01-16 NOTE — PROGRESS NOTES
[facility] Visit    REFERRING PROVIDER: Ronald Pearson   CHIEF COMPLAINT: NAFLD, liver lesion     HPI: This is a 44 y.o. White male here for follow up of NAFLD and liver masses. He was last seen by Kierra Kiser NP 6 months ago as a new pt.     Work up consistent with NAFLD. Has had elevated transaminases since 7/2018, ALT > AST. Enzymes normal in 4/2018, no labs prior to this to review. Alk phos and Tbili nl. He had negative viral hepatitis panel, normal ASMA, ITZ. Ferritin elevated 434 with normal serum iron and iron sat. HH DNA analysis negative for C282Y and H63D genes. Normal ceruloplasmin, A1AT and negative viral hep.     In terms of liver mass, last imaging done was MRI, nonspecific lesion noted. He was reviewed at IR and plan was for f/u imaging with U/S. This will be repeated in 6 moths. Prior history of imaging done below.     U/s done 3/20/19 - liver enlarged 18.0 cm with steatosis. 2.2 x 2.0 x 1.7 cm area, ? Mass, spleen nl 9.1 cm    TPCT done 3/27/19 - hepatomegaly 18.6 cm with steatosis. 1.8 cm hyperdense nodule in the right lobe, indeterminate    Had MRI at outside facility, DIS. No AFP done.  - MRI 5/15/19 - 1.8 cm lesion in right lobe of liver, demonstrates uniform hyperintense contrast enhancement on most delayed postcontrast scans. ? Cavernous hemangioma vs HCC vs other, indeterminate      ETOH: heavy drinker in his 20s but drinks socially  Herbal supplements or concerning medications: none  Has risk factors for NAFLD - overweight, HTN, HLD    BP uncontrolled today, reports 130s/80s-90s at home. Does have stage 3 kidney disease.     No family history of liver disease.      Today he feels well, no complaints. Denies symptoms of hepatic decompensation including jaundice, dark urine, hematemesis, melena, slowed mentation, abdominal distention, or lower extremity edema.     Lab Results   Component Value Date    ALT 98 (H) 07/03/2019    AST 51 (H) 07/03/2019    ALKPHOS 47 (L)  2019    BILITOT 0.8 2019    ALBUMIN 4.5 2019    INR 0.9 2018     2019       Review of patient's allergies indicates:  No Known Allergies      Current Outpatient Medications on File Prior to Visit   Medication Sig Dispense Refill    amlodipine-benazepril (LOTREL) 10-40 mg per capsule Take 1 capsule by mouth once daily. 90 capsule 1    fenofibrate 160 MG Tab Take 1 tablet (160 mg total) by mouth once daily. 90 tablet 1    hydroCHLOROthiazide (HYDRODIURIL) 25 MG tablet Take 1 tablet (25 mg total) by mouth once daily. 90 tablet 1    spironolactone (ALDACTONE) 25 MG tablet Take 1 tablet (25 mg total) by mouth 2 (two) times daily. For high blood pressure 180 tablet 1    tiZANidine (ZANAFLEX) 4 MG tablet Take 1 tablet (4 mg total) by mouth nightly as needed. Do not take with alcohol 25 tablet 0    ketoconazole (NIZORAL) 2 % cream Apply topically once daily. (Patient not taking: Reported on 2020) 60 g 3     No current facility-administered medications on file prior to visit.          PMHX:  has a past medical history of Hypertension and Hypertriglyceridemia (2018).    PSHX:  has no past surgical history on file.    FAMILY HISTORY: Negative for liver disease, reviewed in [employer].    SOCIAL HISTORY:   Social History     Tobacco Use   Smoking Status Former Smoker    Packs/day: 1.00    Years: 25.00    Pack years: 25.00    Start date:     Last attempt to quit: 2018    Years since quittin.7   Smokeless Tobacco Never Used       Social History     Substance and Sexual Activity   Alcohol Use Yes    Comment: socially       Social History     Substance and Sexual Activity   Drug Use No    Comment: remote h/o cocaine use in 20's     ROS:   GENERAL: Denies fever, chills, weight loss/gain, fatigue  HEENT: Denies headaches, dizziness, vision/hearing changes  CARDIOVASCULAR: Denies chest pain, palpitations, or edema  RESPIRATORY: Denies dyspnea, cough  GI: Denies  "abdominal pain, rectal bleeding, nausea, vomiting. No change in bowel pattern or color  : Denies dysuria, hematuria   SKIN: Denies rash, itching   NEURO: Denies confusion, memory loss, or mood changes  PSYCH: Denies depression or anxiety  HEME/LYMPH: Denies easy bruising or bleeding  MSK: Denies joint pain or myalgia.     PHYSICAL EXAM:   Friendly White male, in no acute distress; alert and oriented to person, place and time  VITALS: BP (!) 137/111 (BP Location: Right arm, Patient Position: Sitting, BP Method: Medium (Automatic)) Comment: 151/100 first reading on left arm  Pulse 79   Resp 18   Ht 5' 7" (1.702 m)   Wt 84.9 kg (187 lb 2.7 oz)   SpO2 96%   BMI 29.32 kg/m²    HENT: Normocephalic, without obvious abnormality. Oral mucosa pink and moist.   EYES: Sclerae anicteric.   RESPIRATORY: Normal respiratory effort.   GI: Soft, non-tender, non-distended.   EXTREMITIES:  No clubbing, cyanosis or edema.  SKIN: Warm and dry. No jaundice. No rashes noted to exposed skin. No telangectasias noted. No palmar erythema.  NEURO:  Normal gait. No asterixis.  PSYCH:  Memory intact. Thought and speech pattern appropriate. Behavior normal. No depression or anxiety noted.      LABS:  Lab Results   Component Value Date    WBC 7.69 05/22/2019    HGB 15.8 05/22/2019    HCT 48.5 05/22/2019     05/22/2019     06/12/2019    K 4.3 06/12/2019    CREATININE 1.6 (H) 06/12/2019    ALT 98 (H) 07/03/2019    AST 51 (H) 07/03/2019    ALKPHOS 47 (L) 07/03/2019    BILITOT 0.8 07/03/2019    BILIDIR 0.3 07/03/2019    ALBUMIN 4.5 07/03/2019    INR 0.9 04/19/2018    AFP 3.6 07/03/2019    CHOL 251 (H) 12/05/2018    TRIG 343 (H) 12/05/2018    LDLCALC 141.4 12/05/2018    HGBA1C 5.4 04/19/2018       Hepatitis A Antibody IgG   Date Value Ref Range Status   07/03/2019 Positive (A)  Final       Hepatitis B Surface Ag   Date Value Ref Range Status   03/20/2019 Negative  Final     Hep B Core Total Ab   Date Value Ref Range Status "   07/03/2019 Negative  Final     Hep B S Ab   Date Value Ref Range Status   07/03/2019 Negative  Final     Hepatitis C Ab   Date Value Ref Range Status   03/20/2019 Negative  Final     Immunization History   Administered Date(s) Administered    Influenza - Quadrivalent - MDCK - PF 09/25/2019    Influenza - Quadrivalent - PF (6 months and older) 11/09/2018    Pneumococcal Conjugate - 13 Valent 04/20/2018     DIAGNOSTIC STUDIES:  MRI Abdomen W WO Contrast   Order: [id number]   Status:  Final result   Visible to patient:  Yes (Patient Portal) Next appt:  07/30/2020 at 08:45 AM in Radiology ([facility] US1) Dx:  Liver masses   Details     Reading Physician Reading Date Result Priority   Moise Oseguera MD 1/9/2020 STAT      Narrative     EXAMINATION:  MRI ABDOMEN W WO CONTRAST    CLINICAL HISTORY:  Liver masses, probably benign on screening after primary tumor treated;liver mass;  Hepatomegaly, not elsewhere classified    TECHNIQUE:  Multisequence, multiplanar MRI of the abdomen performed per liver protocol before and after administration of 10 mL Gadavist intravenous contrast.    COMPARISON:  CT abdomen pelvis with contrast 03/27/2019, ultrasound abdomen limited 03/20/2019.    FINDINGS:  Liver: The liver is enlarged in size measuring 18.9 cm and demonstrates intense signal dropout on out of phase imaging, suggesting diffuse hepatic steatosis.  There is a 2.5 x 1.9 cm T2 hypointense/T1 hyperintense lesion in the right hepatic lobe (coronal series 3, image 21).  This lesion does not demonstrate contrast enhancement, washout, or pseudo capsule.  No cystic component or calcifications.    Hepatic and portal veins are patent.    Biliary: Gallbladder is unremarkable.  No intrahepatic or extrahepatic biliary dilatation.    Pancreas: No masses.  No pancreatic ductal dilatation.    Spleen: The spleen is normal in size with no focal lesions.    Adrenal glands: Unremarkable.    Kidneys: No renal masses are hydronephrosis.   Multiple rounded T2 hyperintense lesions throughout both kidneys, favoring simple renal cysts.    Miscellaneous: Visualized bowel loops are unremarkable.  No evidence for lymphadenopathy.    Osseous structures are unremarkable.      Impression       Nonspecific 2.5 x 1.9 cm T2 hypointense/T1 hyperintense lesion in the right hepatic lobe which does not demonstrate contrast enhancement, washout, or pseudo capsule.  Considerations include hepatic adenoma with possible recent hemorrhage, atypical hemangioma, hematoma if clinical history includes trauma or recent biopsy, and focal nodular hyperplasia.  Continued follow-up to document stability or resolution of this finding is recommended.    Hepatomegaly with diffuse hepatic steatosis.    Multiple simple renal cysts.             ASSESSMENT:  44 y.o. White male with:  1. Elevated liver enzymes   -- transaminases elevated, ALT > AST, normal in 2017 when weight was 15 lbs less  -- u/s shows hepatomegaly with fatty liver  -- serological workup was negative for hemochromatosis, autoimmune etiology, wilsons, A1AT and viral hepatitis  -- ferritin elevated but likely d/t inflammation  2. NAFLD  -- transaminases mildly elevated   -- US shows hepatomegaly with fatty liver  -- synthetic liver function nl  -- risk factors for fatty liver include overweight, HTN, HLD  3. Elevated ferritin, suspect d/t inflammation secondary to fatty liver  -- normal serum iron and iron sat  -- HH DNA analysis negative for C282Y and H63D genes   4. Liver mass, indeterminate on TPCT and MRI  -- normal AFP  -- reviewed at IR 01/2020 with plan for repeat imaging via U/S, given that last interval was 6 months, will repeat in 6 months   5. HTN  -- 137/111, asymptomatic  -- recommended f/u with PCP  6. CKD stage 3  -- likely 2/2 uncontrolled HTN  -- recommended f/u with PCP     EDUCATION / DISCUSSION:   We discussed the manifestations of NAFLD. At this time there is no FDA approved therapy for NAFLD.   The  patient and I discussed the importance of diet, exercise, and weight loss for management of NAFLD. We discussed a low fat, low carb/low sugar, high fiber diet, and a goal of 30 minutes of exercise 5 times per week.   Pt is aware that fatty liver can progress to steatohepatitis and possibly to cirrhosis, putting one at increased risk for liver cancer, liver failure, and death.      PLAN:  1. Weight loss goal of 10-15 lbs  2. Low fat, low cholesterol, low carb, high fiber, high protein diet  3. Exercise, 5 days a week, 30 min a day  4. Recommend good control of cholesterol, blood pressure, blood sugar levels  5. Repeat U/S due 07/2020  6. Repeat fibroscan 07/2020  7. F/u with U/S and fibroscan     Thank you for allowing me to participate in the care of Thuan D Vu Jamie L Juckett, PA-C [facility]

## 2020-02-26 ENCOUNTER — OFFICE VISIT (OUTPATIENT)
Dept: URGENT CARE | Facility: CLINIC | Age: 45
End: 2020-02-26
Payer: COMMERCIAL

## 2020-02-26 VITALS
HEART RATE: 93 BPM | BODY MASS INDEX: 29.35 KG/M2 | WEIGHT: 187 LBS | HEIGHT: 67 IN | SYSTOLIC BLOOD PRESSURE: 121 MMHG | DIASTOLIC BLOOD PRESSURE: 78 MMHG | TEMPERATURE: 98 F | OXYGEN SATURATION: 96 %

## 2020-02-26 DIAGNOSIS — E11.65 TYPE 2 DIABETES MELLITUS WITH HYPERGLYCEMIA, WITHOUT LONG-TERM CURRENT USE OF INSULIN: Primary | ICD-10-CM

## 2020-02-26 DIAGNOSIS — R35.0 FREQUENT URINATION: ICD-10-CM

## 2020-02-26 LAB
BILIRUB UR QL STRIP: NEGATIVE
GLUCOSE SERPL-MCNC: 500 MG/DL (ref 70–110)
GLUCOSE UR QL STRIP: POSITIVE
KETONES UR QL STRIP: NEGATIVE
LEUKOCYTE ESTERASE UR QL STRIP: NEGATIVE
PH, POC UA: 7.5 (ref 5–8)
POC BLOOD, URINE: NEGATIVE
POC NITRATES, URINE: NEGATIVE
PROT UR QL STRIP: NEGATIVE
SP GR UR STRIP: 1 (ref 1–1.03)
UROBILINOGEN UR STRIP-ACNC: NORMAL (ref 0.3–2.2)

## 2020-02-26 PROCEDURE — 99213 PR OFFICE/OUTPT VISIT, EST, LEVL III, 20-29 MIN: ICD-10-PCS | Mod: 25,S$GLB,, | Performed by: FAMILY MEDICINE

## 2020-02-26 PROCEDURE — 82962 GLUCOSE BLOOD TEST: CPT | Mod: S$GLB,,, | Performed by: FAMILY MEDICINE

## 2020-02-26 PROCEDURE — 99213 OFFICE O/P EST LOW 20 MIN: CPT | Mod: 25,S$GLB,, | Performed by: FAMILY MEDICINE

## 2020-02-26 PROCEDURE — 81003 URINALYSIS AUTO W/O SCOPE: CPT | Mod: QW,S$GLB,, | Performed by: FAMILY MEDICINE

## 2020-02-26 PROCEDURE — 81003 POCT URINALYSIS, DIPSTICK, AUTOMATED, W/O SCOPE: ICD-10-PCS | Mod: QW,S$GLB,, | Performed by: FAMILY MEDICINE

## 2020-02-26 PROCEDURE — 82962 POCT GLUCOSE, HAND-HELD DEVICE: ICD-10-PCS | Mod: S$GLB,,, | Performed by: FAMILY MEDICINE

## 2020-02-26 NOTE — PATIENT INSTRUCTIONS
Patient will go to [facility] ER for further evaluation and treatment of new diagnosis of DM 2 with glucose level above 500.  Need to rule out DKA.    Carmine Garcia MD

## 2020-02-26 NOTE — PROGRESS NOTES
"Subjective:       Patient ID: Mark Rivera is a 44 y.o. male.    Vitals:  height is 5' 7" (1.702 m) and weight is 84.8 kg (187 lb). His oral temperature is 97.6 °F (36.4 °C). His blood pressure is 121/78 and his pulse is 93. His oxygen saturation is 96%.     Chief Complaint: Urinary Frequency    Urinary Frequency    This is a new problem. The current episode started in the past 7 days (a week). The pain is at a severity of 0/10. The patient is experiencing no pain. There has been no fever. He is sexually active. There is no history of pyelonephritis. Associated symptoms include frequency. Pertinent negatives include no behavior changes, chills, discharge, flank pain, hematuria, hesitancy, nausea, possible pregnancy, sweats, urgency, vomiting, weight loss, bubble bath use, constipation, rash or withholding. Associated symptoms comments: thirsty, Muscle spasm in calves. There is no history of catheterization, diabetes insipidus, diabetes mellitus, genitourinary reflux, hypertension, kidney stones, recurrent UTIs, a single kidney, STD, urinary stasis or a urological procedure.       Constitution: Negative for chills and fever.   Neck: Negative for painful lymph nodes.   Eyes: Positive for blurred vision.   Gastrointestinal: Negative for abdominal pain, nausea, vomiting and constipation.   Genitourinary: Positive for frequency. Negative for dysuria, urgency, urine decreased, flank pain, hematuria, history of kidney stones, genital trauma, painful intercourse, genital sore, penile discharge, painful ejaculation, penile pain, penile swelling, scrotal swelling and testicular pain.   Musculoskeletal: Negative for back pain.   Skin: Negative for rash and lesion.   Hematologic/Lymphatic: Negative for swollen lymph nodes.       Objective:      Physical Exam   Constitutional: He is oriented to person, place, and time. He appears well-developed and well-nourished. He is cooperative.  Non-toxic appearance. He does not appear ill. " No distress.   HENT:   Head: Normocephalic and atraumatic.   Right Ear: Hearing, tympanic membrane, external ear and ear canal normal.   Left Ear: Hearing, tympanic membrane, external ear and ear canal normal.   Nose: Nose normal. No mucosal edema, rhinorrhea or nasal deformity. No epistaxis. Right sinus exhibits no maxillary sinus tenderness and no frontal sinus tenderness. Left sinus exhibits no maxillary sinus tenderness and no frontal sinus tenderness.   Mouth/Throat: Uvula is midline, oropharynx is clear and moist and mucous membranes are normal. No trismus in the jaw. Normal dentition. No uvula swelling. No posterior oropharyngeal erythema.   Eyes: Conjunctivae and lids are normal. Right eye exhibits no discharge. Left eye exhibits no discharge. No scleral icterus.   Neck: Trachea normal, normal range of motion, full passive range of motion without pain and phonation normal. Neck supple.   Cardiovascular: Normal rate, regular rhythm, normal heart sounds, intact distal pulses and normal pulses.   Pulmonary/Chest: Effort normal and breath sounds normal. No respiratory distress. He has no wheezes. He has no rales.   Abdominal: Soft. Normal appearance and bowel sounds are normal. He exhibits no distension and no pulsatile midline mass. There is no tenderness.   Musculoskeletal: Normal range of motion. He exhibits no edema or deformity.   Neurological: He is alert and oriented to person, place, and time. He exhibits normal muscle tone. Coordination normal.   Skin: Skin is warm, dry, intact, not diaphoretic and not pale.   Psychiatric: He has a normal mood and affect. His speech is normal and behavior is normal. Judgment and thought content normal. Cognition and memory are normal.   Nursing note and vitals reviewed.        Assessment:       1. Type 2 diabetes mellitus with hyperglycemia, without long-term current use of insulin    2. Frequent urination        Plan:         Type 2 diabetes mellitus with  hyperglycemia, without long-term current use of insulin  -     Refer to Emergency Dept.    Frequent urination  -     POCT Urinalysis, Dipstick, Automated, W/O Scope  -     POCT Glucose, Hand-Held Device         Patient Instructions   Patient will go to [facility] ER for further evaluation and treatment of new diagnosis of DM 2 with glucose level above 500.  Need to rule out DKA.    Carmine Garcia MD

## 2020-03-09 ENCOUNTER — OFFICE VISIT (OUTPATIENT)
Dept: INTERNAL MEDICINE | Facility: CLINIC | Age: 45
End: 2020-03-09
Payer: COMMERCIAL

## 2020-03-09 ENCOUNTER — LAB VISIT (OUTPATIENT)
Dept: LAB | Facility: HOSPITAL | Age: 45
End: 2020-03-09
Attending: INTERNAL MEDICINE
Payer: COMMERCIAL

## 2020-03-09 VITALS
SYSTOLIC BLOOD PRESSURE: 152 MMHG | BODY MASS INDEX: 27.87 KG/M2 | HEART RATE: 70 BPM | WEIGHT: 177.56 LBS | DIASTOLIC BLOOD PRESSURE: 110 MMHG | HEIGHT: 67 IN | OXYGEN SATURATION: 96 %

## 2020-03-09 DIAGNOSIS — I10 ESSENTIAL HYPERTENSION, BENIGN: ICD-10-CM

## 2020-03-09 DIAGNOSIS — E11.65 UNCONTROLLED TYPE 2 DIABETES MELLITUS WITH HYPERGLYCEMIA: ICD-10-CM

## 2020-03-09 DIAGNOSIS — E11.65 UNCONTROLLED TYPE 2 DIABETES MELLITUS WITH HYPERGLYCEMIA: Primary | ICD-10-CM

## 2020-03-09 LAB
ANION GAP SERPL CALC-SCNC: 7 MMOL/L (ref 8–16)
BUN SERPL-MCNC: 14 MG/DL (ref 6–20)
CALCIUM SERPL-MCNC: 9.4 MG/DL (ref 8.7–10.5)
CHLORIDE SERPL-SCNC: 105 MMOL/L (ref 95–110)
CO2 SERPL-SCNC: 27 MMOL/L (ref 23–29)
CREAT SERPL-MCNC: 1.2 MG/DL (ref 0.5–1.4)
EST. GFR  (AFRICAN AMERICAN): >60 ML/MIN/1.73 M^2
EST. GFR  (NON AFRICAN AMERICAN): >60 ML/MIN/1.73 M^2
GLUCOSE SERPL-MCNC: 155 MG/DL (ref 70–110)
POTASSIUM SERPL-SCNC: 4.3 MMOL/L (ref 3.5–5.1)
SODIUM SERPL-SCNC: 139 MMOL/L (ref 136–145)

## 2020-03-09 PROCEDURE — 3080F DIAST BP >= 90 MM HG: CPT | Mod: CPTII,S$GLB,, | Performed by: INTERNAL MEDICINE

## 2020-03-09 PROCEDURE — 83036 HEMOGLOBIN GLYCOSYLATED A1C: CPT

## 2020-03-09 PROCEDURE — 3080F PR MOST RECENT DIASTOLIC BLOOD PRESSURE >= 90 MM HG: ICD-10-PCS | Mod: CPTII,S$GLB,, | Performed by: INTERNAL MEDICINE

## 2020-03-09 PROCEDURE — 3077F SYST BP >= 140 MM HG: CPT | Mod: CPTII,S$GLB,, | Performed by: INTERNAL MEDICINE

## 2020-03-09 PROCEDURE — 99999 PR PBB SHADOW E&M-EST. PATIENT-LVL IV: CPT | Mod: PBBFAC,,, | Performed by: INTERNAL MEDICINE

## 2020-03-09 PROCEDURE — 36415 COLL VENOUS BLD VENIPUNCTURE: CPT

## 2020-03-09 PROCEDURE — 99214 PR OFFICE/OUTPT VISIT, EST, LEVL IV, 30-39 MIN: ICD-10-PCS | Mod: S$GLB,,, | Performed by: INTERNAL MEDICINE

## 2020-03-09 PROCEDURE — 80048 BASIC METABOLIC PNL TOTAL CA: CPT

## 2020-03-09 PROCEDURE — 3008F PR BODY MASS INDEX (BMI) DOCUMENTED: ICD-10-PCS | Mod: CPTII,S$GLB,, | Performed by: INTERNAL MEDICINE

## 2020-03-09 PROCEDURE — 99999 PR PBB SHADOW E&M-EST. PATIENT-LVL IV: ICD-10-PCS | Mod: PBBFAC,,, | Performed by: INTERNAL MEDICINE

## 2020-03-09 PROCEDURE — 82043 UR ALBUMIN QUANTITATIVE: CPT

## 2020-03-09 PROCEDURE — 3008F BODY MASS INDEX DOCD: CPT | Mod: CPTII,S$GLB,, | Performed by: INTERNAL MEDICINE

## 2020-03-09 PROCEDURE — 3077F PR MOST RECENT SYSTOLIC BLOOD PRESSURE >= 140 MM HG: ICD-10-PCS | Mod: CPTII,S$GLB,, | Performed by: INTERNAL MEDICINE

## 2020-03-09 PROCEDURE — 99214 OFFICE O/P EST MOD 30 MIN: CPT | Mod: S$GLB,,, | Performed by: INTERNAL MEDICINE

## 2020-03-09 RX ORDER — BLOOD-GLUCOSE METER
EACH MISCELLANEOUS
COMMUNITY
Start: 2020-02-28

## 2020-03-09 RX ORDER — PEN NEEDLE, DIABETIC 29 G X1/2"
NEEDLE, DISPOSABLE MISCELLANEOUS
COMMUNITY
Start: 2020-02-28

## 2020-03-09 RX ORDER — CALCIUM CITRATE/VITAMIN D3 200MG-6.25
TABLET ORAL
COMMUNITY
Start: 2020-02-28

## 2020-03-09 RX ORDER — LANCETS
EACH MISCELLANEOUS
COMMUNITY
Start: 2020-02-28

## 2020-03-09 RX ORDER — INSULIN GLARGINE 100 [IU]/ML
INJECTION, SOLUTION SUBCUTANEOUS
COMMUNITY
Start: 2020-02-28 | End: 2020-03-26 | Stop reason: SDUPTHER

## 2020-03-09 NOTE — PROGRESS NOTES
"    CHIEF COMPLAINT     Chief Complaint   Patient presents with    Establish Care     also has liver and kidney problem i naddition to high BP    Hyperglycemia     ER f/u       HPI     Mark Rivera is a 44 y.o. male here today for     Patient here with brother in Law who contributes to HPI    Patient was seen by eye doctor approximately 1 week ago for acute blurring of vision. He was found to be hyperglycemic >600 and sent to . Do not have records at this appointment. However, from patient and brother in law, sounds like he was in the ICU--> floor and diagnosed with diabetes. His antihypertensives were held at discharge and patient started on 20 units lantus nightly.  Has been checking his sugars AM fastings in 140s-150, post prandials >300. Reports that his polyuria and polydipsia has improved since starting in insulin.    Reports his mother has type 2 DM and is on oral meds but doesn't have good grasp of DM    Personally Reviewed Patient's Medical, surgical, family and social hx. Changes updated in Epic.  [phone]  [facility]  Care Team updated in Epic    Review of Systems:  Review of Systems   Constitutional: Negative for fever and unexpected weight change.   Eyes: Negative for visual disturbance.   Endocrine: Positive for polyphagia and polyuria.   Genitourinary: Positive for frequency.   Neurological: Negative for numbness.       Health Maintenance:   Reviewed with patient  Due for the following:  deferred      PHYSICAL EXAM     BP (!) 152/110 (BP Location: Left arm, Patient Position: Sitting, BP Method: Large (Manual))   Pulse 70   Ht 5' 7" (1.702 m)   Wt 80.6 kg (177 lb 9.3 oz)   SpO2 96%   BMI 27.81 kg/m²     Gen: Well Appearing, NAD  HEENT: PERR, EOMI  Neck: FROM, no thyromegaly, no cervical adenopathy  CVD: RRR, no M/R/G  Pulm: Normal work of breathing, CTAB, no wheezing  Abd:  Soft, NT, ND non TTP, no mass  MSK: no LE edema  Neuro: A&Ox3, gait normal, speech normal  Mood; Mood normal, behavior normal, " thought process linear       LABS     Labs reviewed; Notable for    ASSESSMENT     1. Uncontrolled type 2 diabetes mellitus with hyperglycemia  Ambulatory referral/consult to Diabetes Education    Basic metabolic panel    MICROALBUMIN / CREATININE RATIO URINE    Hemoglobin A1c    Ambulatory referral/consult to Ophthalmology   2. Essential hypertension, benign             Plan     Mark Rivera is a 44 y.o. male with  1. Uncontrolled type 2 diabetes mellitus with hyperglycemia  New problem, further mgmt planned  Status:uncontrolled, New dx, pretty interesting, has been closely followed by hepatology for GRIMES. Had one elevated  in 4/2019 but last two random CBG <140.   A1c Goal:<7  Meds:    Continue: lantus 20 units nightly              Changes: will need additional therapies but need to check GFR prior to choosing agent.  HTN: not controlled will restart agent after GFR  ASCVD: will need statin but waiting until we start   Micro: will check today  Foot: next time  Eye: referred  Lifestyle: Recommend at least 7% weight loss, at least 150 mins moderate intensity exercise/week, and 8 hours of sleep nightly  Will send pt to DM education, and have him return in weeks with his cbg log. Will need foot exam and pneumovax at next visit.  Will get release of [facility] records.  - Ambulatory referral/consult to Diabetes Education; Future  - Basic metabolic panel; Future  - MICROALBUMIN / CREATININE RATIO URINE  - Hemoglobin A1c; Future  - Ambulatory referral/consult to Ophthalmology; Future    2. Essential hypertension, benign  Will restart agent after seeing GFR will probably need multiple agents.    Yonas Najera MD

## 2020-03-10 ENCOUNTER — TELEPHONE (OUTPATIENT)
Dept: INTERNAL MEDICINE | Facility: CLINIC | Age: 45
End: 2020-03-10

## 2020-03-10 PROBLEM — N18.30 STAGE 3 CHRONIC KIDNEY DISEASE: Status: RESOLVED | Noted: 2018-08-30 | Resolved: 2020-03-10

## 2020-03-10 PROBLEM — E11.29 TYPE 2 DIABETES MELLITUS WITH MICROALBUMINURIA, WITHOUT LONG-TERM CURRENT USE OF INSULIN: Status: ACTIVE | Noted: 2020-03-09

## 2020-03-10 PROBLEM — R80.9 TYPE 2 DIABETES MELLITUS WITH MICROALBUMINURIA, WITHOUT LONG-TERM CURRENT USE OF INSULIN: Status: ACTIVE | Noted: 2020-03-09

## 2020-03-10 LAB
ALBUMIN/CREAT UR: 45.4 UG/MG (ref 0–30)
CREAT UR-MCNC: 152 MG/DL (ref 23–375)
ESTIMATED AVG GLUCOSE: 352 MG/DL (ref 68–131)
HBA1C MFR BLD HPLC: 13.9 % (ref 4–5.6)
MICROALBUMIN UR DL<=1MG/L-MCNC: 69 UG/ML

## 2020-03-10 NOTE — TELEPHONE ENCOUNTER
----- Message from Yonas Najera MD sent at 3/10/2020  7:38 AM CDT -----  Was planning on calling patient yesterday but lab didn't result until 10pm  Patient speaks Yakut but his Brother in Law Rosado [phone] speaks english and can help him regarding medication directions.    Patient's renal function normalized from recent hospitalization.     Want to restart his lotrel (amoldipine benazepril 10-40) blood pressure medication. (he should have this)  Regarding diabetes want to start metformin 500mg twice a day take after meal.  Continue lantus 20 units nightly    He should have lotrel at home, but will need new script for metformin.    He has follow up with me in 2 weeks where we will add additional agent.   Please let me know if you have further questions.

## 2020-03-11 DIAGNOSIS — E11.9 TYPE 2 DIABETES MELLITUS WITHOUT COMPLICATION, WITHOUT LONG-TERM CURRENT USE OF INSULIN: Primary | ICD-10-CM

## 2020-03-11 RX ORDER — METFORMIN HYDROCHLORIDE 500 MG/1
500 TABLET ORAL 2 TIMES DAILY WITH MEALS
Qty: 180 TABLET | Refills: 3 | Status: SHIPPED | OUTPATIENT
Start: 2020-03-11 | End: 2020-03-26

## 2020-03-11 NOTE — TELEPHONE ENCOUNTER
Called for pt's brother in law as instructed but there was no answer. Used Language line services to leave a message for pt or family to return a call to us.

## 2020-03-11 NOTE — TELEPHONE ENCOUNTER
Used language line a second time when pt's brother-in-law returned the call.  [id number] was used to give result info and inform of plan to restart Lotrel for his blood pressure and start new medication Metformin 500 mg. Encouraged to drink plenty of water and avoid rice, potatoes,corn and soft drinks. Use the glucometer to check and record his blood sugars.

## 2020-03-12 ENCOUNTER — PATIENT OUTREACH (OUTPATIENT)
Dept: ADMINISTRATIVE | Facility: HOSPITAL | Age: 45
End: 2020-03-12

## 2020-03-25 ENCOUNTER — TELEPHONE (OUTPATIENT)
Dept: INTERNAL MEDICINE | Facility: CLINIC | Age: 45
End: 2020-03-25

## 2020-03-25 NOTE — TELEPHONE ENCOUNTER
Called and left msg for patient telling him that he needs not come into the office tomorrow for his visit and that Dr. Najera will call him on the phone with the language line present to help interpret. I reiterated that he does not need to come in, but rather to stay home. Sending him a myChart message as well.    Jose Eduardo Cavazos

## 2020-03-26 ENCOUNTER — TELEPHONE (OUTPATIENT)
Dept: INTERNAL MEDICINE | Facility: CLINIC | Age: 45
End: 2020-03-26

## 2020-03-26 DIAGNOSIS — E11.9 TYPE 2 DIABETES MELLITUS WITHOUT COMPLICATION, WITHOUT LONG-TERM CURRENT USE OF INSULIN: ICD-10-CM

## 2020-03-26 RX ORDER — DAPAGLIFLOZIN 5 MG/1
5 TABLET, FILM COATED ORAL DAILY
Qty: 90 TABLET | Refills: 3 | Status: SHIPPED | OUTPATIENT
Start: 2020-03-26 | End: 2021-03-08

## 2020-03-26 RX ORDER — INSULIN GLARGINE 100 [IU]/ML
20 INJECTION, SOLUTION SUBCUTANEOUS NIGHTLY
Qty: 15 ML | Refills: 1 | Status: SHIPPED | OUTPATIENT
Start: 2020-03-26 | End: 2020-06-11

## 2020-03-26 RX ORDER — METFORMIN HYDROCHLORIDE 1000 MG/1
1000 TABLET ORAL 2 TIMES DAILY WITH MEALS
Qty: 180 TABLET | Refills: 3 | Status: SHIPPED | OUTPATIENT
Start: 2020-03-26 | End: 2021-03-08

## 2020-03-26 NOTE — TELEPHONE ENCOUNTER
Called pt to remind him not to come into the office as a measure to help with him not potentially getting exposed to COVID19. He agreed and said he will stay home and await Dr. Najera's call around 9am.    Jose Eduardo Cavazos

## 2020-03-26 NOTE — TELEPHONE ENCOUNTER
Done via telephone with language line.  HTN  Patient has been taking lotrel 10-40 for HTN. BP 120s-130s/80s. No episode of hypoTN.      DM2  Taking lantus 20u and metformin 500mg BID.  Reports AM fasting 90s-130  Evening 200-250s.  Denies CBG <90 and >300.   Reports tolerating metforming well.    1. Type 2 diabetes mellitus without complication, without long-term current use of insulin  Continue lantus 20u Daily  Increase metformin to 1000mg BID  Start farxiga 5mg daily.  Will need statin: but didn't want to add over phone.    - LANTUS SOLOSTAR U-100 INSULIN glargine 100 units/mL (3mL) SubQ pen; Inject 20 Units into the skin every evening.  Dispense: 15 mL; Refill: 1  - metFORMIN (GLUCOPHAGE) 1000 MG tablet; Take 1 tablet (1,000 mg total) by mouth 2 (two) times daily with meals.  Dispense: 180 tablet; Refill: 3  - dapagliflozin (FARXIGA) 5 mg Tab tablet; Take 1 tablet (5 mg total) by mouth once daily.  Dispense: 90 tablet; Refill: 3    RTC 1 month.

## 2020-04-28 ENCOUNTER — TELEPHONE (OUTPATIENT)
Dept: INTERNAL MEDICINE | Facility: CLINIC | Age: 45
End: 2020-04-28

## 2020-04-28 DIAGNOSIS — E08.29 DIABETES MELLITUS DUE TO UNDERLYING CONDITION WITH MICROALBUMINURIA, WITHOUT LONG-TERM CURRENT USE OF INSULIN: Primary | ICD-10-CM

## 2020-04-28 DIAGNOSIS — I10 HYPERTENSION, WELL CONTROLLED: ICD-10-CM

## 2020-04-28 DIAGNOSIS — R80.9 DIABETES MELLITUS DUE TO UNDERLYING CONDITION WITH MICROALBUMINURIA, WITHOUT LONG-TERM CURRENT USE OF INSULIN: Primary | ICD-10-CM

## 2020-04-28 DIAGNOSIS — E78.1 HYPERTRIGLYCERIDEMIA: ICD-10-CM

## 2020-04-28 RX ORDER — FENOFIBRATE 160 MG/1
160 TABLET ORAL DAILY
Qty: 90 TABLET | Refills: 1 | OUTPATIENT
Start: 2020-04-28 | End: 2020-10-25

## 2020-04-28 RX ORDER — ATORVASTATIN CALCIUM 20 MG/1
20 TABLET, FILM COATED ORAL DAILY
Qty: 90 TABLET | Refills: 3 | Status: SHIPPED | OUTPATIENT
Start: 2020-04-28 | End: 2022-03-14 | Stop reason: SDUPTHER

## 2020-04-28 RX ORDER — SPIRONOLACTONE 25 MG/1
25 TABLET ORAL 2 TIMES DAILY
Qty: 180 TABLET | Refills: 1 | OUTPATIENT
Start: 2020-04-28 | End: 2021-04-28

## 2020-04-28 NOTE — TELEPHONE ENCOUNTER
Received refill request from pharmacy for spironolactone and fenofibrate. However, we had previously stopped these medications.    Used language line to contact patient.    DM2  continue  Metformin 1000mg BID  farxiga 5mg daily  lantus 20 units daily    HTN  Continue lotrel 10-40      HLD  Stop fenofibrate,   Start atorvastatin 20mg daily.    Meds reconciled with language line .     Time on phone 15 minutes.    Yonas Najera

## 2020-04-28 NOTE — TELEPHONE ENCOUNTER
"Last Office Visit Info:   The patient's last visit with Yonas Najera MD was on 3/9/2020.    The patient's last visit in current department was on 10/31/2019.        Last CBC Results:   Lab Results   Component Value Date    WBC 7.69 05/22/2019    HGB 15.8 05/22/2019    HCT 48.5 05/22/2019     05/22/2019       Last CMP Results  Lab Results   Component Value Date     03/09/2020    K 4.3 03/09/2020     03/09/2020    CO2 27 03/09/2020    BUN 14 03/09/2020    CREATININE 1.2 03/09/2020    CALCIUM 9.4 03/09/2020    ALBUMIN 4.5 07/03/2019    AST 51 (H) 07/03/2019    ALT 98 (H) 07/03/2019       Last Lipids  Lab Results   Component Value Date    CHOL 251 (H) 12/05/2018    TRIG 343 (H) 12/05/2018    HDL 41 12/05/2018    LDLCALC 141.4 12/05/2018       Last A1C  Lab Results   Component Value Date    HGBA1C 13.9 (H) 03/09/2020       Last TSH  Lab Results   Component Value Date    TSH 1.768 04/19/2018         Current Med Refills  Medication List with Changes/Refills   Current Medications    AMLODIPINE-BENAZEPRIL (LOTREL) 10-40 MG PER CAPSULE    Take 1 capsule by mouth once daily.       Start Date: 10/31/2019End Date: 4/28/2020    BD ULTRA-FINE ORIG PEN NEEDLE 29 GAUGE X 1/2" NDLE           Start Date: 2/28/2020 End Date: --    DAPAGLIFLOZIN (FARXIGA) 5 MG TAB TABLET    Take 1 tablet (5 mg total) by mouth once daily.       Start Date: 3/26/2020 End Date: --    FENOFIBRATE 160 MG TAB    Take 1 tablet (160 mg total) by mouth once daily.       Start Date: 10/31/2019End Date: 4/28/2020    KETOCONAZOLE (NIZORAL) 2 % CREAM    Apply topically once daily.       Start Date: 3/20/2019 End Date: --    LANTUS SOLOSTAR U-100 INSULIN GLARGINE 100 UNITS/ML (3ML) SUBQ PEN    Inject 20 Units into the skin every evening.       Start Date: 3/26/2020 End Date: --    METFORMIN (GLUCOPHAGE) 1000 MG TABLET    Take 1 tablet (1,000 mg total) by mouth 2 (two) times daily with meals.       Start Date: 3/26/2020 End Date: 3/26/2021    " MICROLET LANCET MISC           Start Date: 2/28/2020 End Date: --    TIZANIDINE (ZANAFLEX) 4 MG TABLET    Take 1 tablet (4 mg total) by mouth nightly as needed. Do not take with alcohol       Start Date: 5/22/2019 End Date: --    TRUE METRIX GLUCOSE METER MISC    USE AS DIRECTED       Start Date: 2/28/2020 End Date: --    TRUE METRIX GLUCOSE TEST STRIP STRP           Start Date: 2/28/2020 End Date: --       Order(s) placed per written order guidelines:     Please advise.

## 2020-05-04 ENCOUNTER — TELEPHONE (OUTPATIENT)
Dept: FAMILY MEDICINE | Facility: CLINIC | Age: 45
End: 2020-05-04

## 2020-05-04 NOTE — TELEPHONE ENCOUNTER
----- Message from Shereen Mcfadden sent at 5/4/2020  3:36 PM CDT -----  Contact: Tyra  Type: Patient Call Back    Who called:Nayana    What is the request in detail:Nayana called to check the status of refill for fenofibrate. Please call to update    Can the clinic reply by [facility]?    Would the patient rather a call back or a response via [facility]?Call    Best call back number:[phone]

## 2020-06-02 ENCOUNTER — PATIENT OUTREACH (OUTPATIENT)
Dept: ADMINISTRATIVE | Facility: OTHER | Age: 45
End: 2020-06-02

## 2020-06-03 ENCOUNTER — CLINICAL SUPPORT (OUTPATIENT)
Dept: DIABETES | Facility: CLINIC | Age: 45
End: 2020-06-03
Payer: COMMERCIAL

## 2020-06-03 ENCOUNTER — LAB VISIT (OUTPATIENT)
Dept: LAB | Facility: HOSPITAL | Age: 45
End: 2020-06-03
Attending: INTERNAL MEDICINE
Payer: COMMERCIAL

## 2020-06-03 DIAGNOSIS — E11.65 UNCONTROLLED TYPE 2 DIABETES MELLITUS WITH HYPERGLYCEMIA: ICD-10-CM

## 2020-06-03 DIAGNOSIS — E11.65 UNCONTROLLED TYPE 2 DIABETES MELLITUS WITH HYPERGLYCEMIA: Primary | ICD-10-CM

## 2020-06-03 LAB
ESTIMATED AVG GLUCOSE: 137 MG/DL (ref 68–131)
HBA1C MFR BLD HPLC: 6.4 % (ref 4–5.6)

## 2020-06-03 PROCEDURE — 99999 PR PBB SHADOW E&M-EST. PATIENT-LVL II: ICD-10-PCS | Mod: PBBFAC,,, | Performed by: DIETITIAN, REGISTERED

## 2020-06-03 PROCEDURE — 36415 COLL VENOUS BLD VENIPUNCTURE: CPT | Mod: PO

## 2020-06-03 PROCEDURE — 99999 PR PBB SHADOW E&M-EST. PATIENT-LVL II: CPT | Mod: PBBFAC,,, | Performed by: DIETITIAN, REGISTERED

## 2020-06-03 PROCEDURE — 83036 HEMOGLOBIN GLYCOSYLATED A1C: CPT

## 2020-06-03 PROCEDURE — G0108 PR DIAB MANAGE TRN  PER INDIV: ICD-10-PCS | Mod: S$GLB,,, | Performed by: DIETITIAN, REGISTERED

## 2020-06-03 PROCEDURE — G0108 DIAB MANAGE TRN  PER INDIV: HCPCS | Mod: S$GLB,,, | Performed by: DIETITIAN, REGISTERED

## 2020-06-03 NOTE — PROGRESS NOTES
"Diabetes Education  Author: Isadora Alcantara RD, CDE  Date: 6/3/2020    Diabetes Care Management Summary  Diabetes Education Record Assessment/Progress: Initial     Diabetes Type  Diabetes Type : Type II    Diabetes History  Diabetes Diagnosis: 0-1 year  Current Treatment: Oral Medication, Insulin(Denies missing doses of medication)    Health Maintenance was reviewed today with patient. Discussed with patient importance of routine eye exams, foot exams/foot care, blood work (i.e.: A1c, microalbumin, and lipid), dental visits, yearly flu vaccine, and pneumonia vaccine as indicated by PCP. Patient verbalized understanding.     Health Maintenance Topics with due status: Not Due       Topic Last Completion Date    Hemoglobin A1c 03/09/2020    Urine Microalbumin 03/09/2020    Low Dose Statin 04/28/2020     Health Maintenance Due   Topic Date Due    Foot Exam  10/30/1984    Eye Exam  10/30/1984    TETANUS VACCINE  10/30/1992    Pneumococcal Vaccine (Highest Risk) (2 of 3 - PPSV23) 06/15/2018    Lipid Panel  12/05/2019       Nutrition  Meal Planning: skipping meals, water, eats out seldom, snacks between meal(Eats 2 meals a day; may have a snack - tends to skips bfast )  What type of sweetener do you use?: none  What type of beverages do you drink?: water, other (see comments), diet soda/tea, juice(Coffee, tea)    Monitoring   Self Monitoring : (Stop checking recently because the "numbers were always the same" running 130s or less)  Blood Glucose Logs: No  Do you use a personal continuous glucose monitor?: No  In the last month, how often have you had a low blood sugar reaction?: never  What are your symptoms of low blood sugar?: (N/A)  How do you treat low blood sugar?: (N/A)  Can you tell when your blood sugar is too high?: no  How do you treat high blood sugar?: (None)    Exercise   Exercise Type: (Some weights)    Current Diabetes Treatment   Current Treatment: Oral Medication, Insulin(Denies missing doses of " medication)    Social History  Preferred Learning Method: Face to Face  Primary Support: Self  Smoking Status: Ex Smoker    Barriers to Change  Barriers to Change: None  Learning Challenges : Language  Language spoken: (Syrian)  Language -  present?: Yes(Over the phone; limited written materials available in Syrian (provided); provided education booklet in English - says he has someone who can translate information for him)    Readiness to Learn   Readiness to Learn : Acceptance    Cultural Influences  Cultural Influences: No    Diabetes Education Assessment/Progress  Diabetes Disease Process (diabetes disease process and treatment options): Instructed, Discussion, Individual Session, Written Materials Provided, Demonstrates Understanding/Competency(verbalizes/demonstrates)  Nutrition (Incorporating nutritional management into one's lifestyle): Instructed, Discussion, Individual Session, Written Materials Provided, Demonstrates Understanding/Competency (verbalizes/demonstrates)  Physical Activity (incorporating physical activity into one's lifestyle): Instructed, Discussion, Individual Session, Written Materials Provided, Demonstrates Understanding/Competency (verbalizes/demonstrates)  Medications (states correct name, dose, onset, peak, duration, side effects & timing of meds): Instructed, Discussion, Individual Session, Written Materials Provided, Demonstrates Understanding/Competency(verbalizes/demonstrates)  Monitoring (monitoring blood glucose/other parameters & using results): Instructed, Discussion, Individual Session, Written Materials Provided, Demonstrates Understanding/Competency (verbalizes/demonstrates)  Acute Complications (preventing, detecting, and treating acute complications): Instructed, Discussion, Individual Session, Written Materials Provided, Demonstrates Understanding/Competency (verbalizes/demonstrates)  Chronic Complications (preventing, detecting, and treating chronic  complications): Instructed, Discussion, Individual Session, Written Materials Provided, Demonstrates Understanding/Competency (verbalizes/demonstrates)  Clinical (diabetes, other pertinent medical history, and relevant comorbidities reviewed during visit): Discussion, Comprehends Key Points  Cognitive (knowledge of self-management skills, functional health literacy): Discussion, Comprehends Key Points  Psychosocial (emotional response to diabetes): Discussion, Comprehends Key Points  Diabetes Distress and Support Systems: Not Covered/Deferred  Behavioral (readiness for change, lifestyle practices, self-care behaviors): Discussion, Comprehends Key Points    Goals  Patient has selected/evaluated goals during today's session: Yes, selected  Monitoring: Set(Check BG daily)    Diabetes Care Plan/Intervention  Education Plan/Intervention: Individual Follow-Up DSMT(Scheduled for A1c today and PCP follow up for next week)    Diabetes Meal Plan  Carbohydrate Per Meal: 30-45g  Carbohydrate Per Snack : 15-20g    Today's Self-Management Care Plan was developed with the patient's input and is based on barriers identified during today's assessment.    The long and short-term goals in the care plan were written with the patient/caregiver's input. The patient has agreed to work toward these goals to improve his overall diabetes control.      The patient received a copy of today's self-management plan and verbalized understanding of the care plan, goals, and all of today's instructions.      The patient was encouraged to communicate with his physician and care team regarding his condition(s) and treatment.  I provided the patient with my contact information today and encouraged him to contact me via phone or patient portal as needed.     Education Units of Time   Time Spent: 60 min

## 2020-06-11 ENCOUNTER — OFFICE VISIT (OUTPATIENT)
Dept: INTERNAL MEDICINE | Facility: CLINIC | Age: 45
End: 2020-06-11
Payer: COMMERCIAL

## 2020-06-11 ENCOUNTER — IMMUNIZATION (OUTPATIENT)
Dept: PHARMACY | Facility: CLINIC | Age: 45
End: 2020-06-11
Payer: COMMERCIAL

## 2020-06-11 VITALS
HEIGHT: 66 IN | DIASTOLIC BLOOD PRESSURE: 94 MMHG | WEIGHT: 180 LBS | BODY MASS INDEX: 28.93 KG/M2 | SYSTOLIC BLOOD PRESSURE: 140 MMHG

## 2020-06-11 DIAGNOSIS — E11.29 TYPE 2 DIABETES MELLITUS WITH MICROALBUMINURIA, WITHOUT LONG-TERM CURRENT USE OF INSULIN: Primary | ICD-10-CM

## 2020-06-11 DIAGNOSIS — R80.9 TYPE 2 DIABETES MELLITUS WITH MICROALBUMINURIA, WITHOUT LONG-TERM CURRENT USE OF INSULIN: Primary | ICD-10-CM

## 2020-06-11 DIAGNOSIS — I10 HYPERTENSION, WELL CONTROLLED: ICD-10-CM

## 2020-06-11 DIAGNOSIS — E78.00 HYPERCHOLESTEROLEMIA: ICD-10-CM

## 2020-06-11 PROCEDURE — 99999 PR PBB SHADOW E&M-EST. PATIENT-LVL III: CPT | Mod: PBBFAC,,, | Performed by: INTERNAL MEDICINE

## 2020-06-11 PROCEDURE — 99214 OFFICE O/P EST MOD 30 MIN: CPT | Mod: S$GLB,,, | Performed by: INTERNAL MEDICINE

## 2020-06-11 PROCEDURE — 99214 PR OFFICE/OUTPT VISIT, EST, LEVL IV, 30-39 MIN: ICD-10-PCS | Mod: S$GLB,,, | Performed by: INTERNAL MEDICINE

## 2020-06-11 PROCEDURE — 3008F PR BODY MASS INDEX (BMI) DOCUMENTED: ICD-10-PCS | Mod: CPTII,S$GLB,, | Performed by: INTERNAL MEDICINE

## 2020-06-11 PROCEDURE — 3008F BODY MASS INDEX DOCD: CPT | Mod: CPTII,S$GLB,, | Performed by: INTERNAL MEDICINE

## 2020-06-11 PROCEDURE — 99999 PR PBB SHADOW E&M-EST. PATIENT-LVL III: ICD-10-PCS | Mod: PBBFAC,,, | Performed by: INTERNAL MEDICINE

## 2020-06-11 PROCEDURE — 3077F SYST BP >= 140 MM HG: CPT | Mod: CPTII,S$GLB,, | Performed by: INTERNAL MEDICINE

## 2020-06-11 PROCEDURE — 3044F PR MOST RECENT HEMOGLOBIN A1C LEVEL <7.0%: ICD-10-PCS | Mod: CPTII,S$GLB,, | Performed by: INTERNAL MEDICINE

## 2020-06-11 PROCEDURE — 3080F DIAST BP >= 90 MM HG: CPT | Mod: CPTII,S$GLB,, | Performed by: INTERNAL MEDICINE

## 2020-06-11 PROCEDURE — 3077F PR MOST RECENT SYSTOLIC BLOOD PRESSURE >= 140 MM HG: ICD-10-PCS | Mod: CPTII,S$GLB,, | Performed by: INTERNAL MEDICINE

## 2020-06-11 PROCEDURE — 3044F HG A1C LEVEL LT 7.0%: CPT | Mod: CPTII,S$GLB,, | Performed by: INTERNAL MEDICINE

## 2020-06-11 PROCEDURE — 3080F PR MOST RECENT DIASTOLIC BLOOD PRESSURE >= 90 MM HG: ICD-10-PCS | Mod: CPTII,S$GLB,, | Performed by: INTERNAL MEDICINE

## 2020-06-11 RX ORDER — INSULIN GLARGINE 100 [IU]/ML
10 INJECTION, SOLUTION SUBCUTANEOUS NIGHTLY
Qty: 15 ML | Refills: 1 | Status: SHIPPED | OUTPATIENT
Start: 2020-06-11 | End: 2020-10-29 | Stop reason: ALTCHOICE

## 2020-06-11 NOTE — PATIENT INSTRUCTIONS
Medications    Continue amlodipine-benazapril for high blood pressure    Diabetes  Continue metformin 1000mg twice a day  Continue farxiga 5mg daily    Changes  Decrease insulin to 10 units nightly  Start trulicity 1 injection once/week

## 2020-06-11 NOTE — PROGRESS NOTES
"    CHIEF COMPLAINT     Chief Complaint   Patient presents with    Follow-up     routine F/U.     Hypertension     thinks he may be here for visit in regards to BP. recent readings: 130s systolic, 80s diastolic.       HPI     Mark Rivera is a 44 y.o. male here today for follow-up for diabetes.    DM2  Metformin 1000mg BID, Lantus 20HS, dapagliflozin 5mg daily.  Patient's morning blood sugars have been in the 130s.  Denies episodes of hypoglycemia.  Reports that he has been exercising more and has lost some weight.  Known complications include microalbuminuria.    Hypertension  Taking amlodipine benazepril 10 40 daily.  Reports compliance medication denies any episodes of low blood pressure.  Reports pressure is a little bit higher today than usual.  Reports blood pressure is normally 130s over 80s at home.        Personally Reviewed Patient's Medical, surgical, family and social hx. Changes updated in [facility].  Care Team updated in Epic    Review of Systems:  Review of Systems   Constitutional: Negative for fever and unexpected weight change.   Respiratory: Negative for cough and shortness of breath.    Cardiovascular: Negative for leg swelling.   Endocrine: Negative for polydipsia, polyphagia and polyuria.   Neurological: Negative for numbness.       Health Maintenance:   Reviewed with patient  Due for the following:      PHYSICAL EXAM     BP (!) 140/94 (BP Location: Right arm, Patient Position: Sitting, BP Method: Large (Manual))   Ht 5' 6" (1.676 m)   Wt 81.6 kg (180 lb)   BMI 29.05 kg/m²     Gen: Well Appearing, NAD  HEENT: PERR, EOMI  Neck: FROM, no thyromegaly, no cervical adenopathy  CVD: RRR, no M/R/G  Pulm: Normal work of breathing, CTAB, no wheezing  Abd:  Soft, NT, ND non TTP, no mass  MSK: no LE edema  Neuro: A&Ox3, gait normal, speech normal  Mood; Mood normal, behavior normal, thought process linear   Diabetic Foot exam  Skin intact 2+ pedal pulse, no skin breakdown or ulcers between toes, " proprioception and monofilament test normal.    LABS     Labs reviewed; Notable for    CMP  Sodium   Date Value Ref Range Status   03/09/2020 139 136 - 145 mmol/L Final     Potassium   Date Value Ref Range Status   03/09/2020 4.3 3.5 - 5.1 mmol/L Final     Chloride   Date Value Ref Range Status   03/09/2020 105 95 - 110 mmol/L Final     CO2   Date Value Ref Range Status   03/09/2020 27 23 - 29 mmol/L Final     Glucose   Date Value Ref Range Status   03/09/2020 155 (H) 70 - 110 mg/dL Final     BUN, Bld   Date Value Ref Range Status   03/09/2020 14 6 - 20 mg/dL Final     Creatinine   Date Value Ref Range Status   03/09/2020 1.2 0.5 - 1.4 mg/dL Final     Calcium   Date Value Ref Range Status   03/09/2020 9.4 8.7 - 10.5 mg/dL Final     Total Protein   Date Value Ref Range Status   07/03/2019 7.9 6.0 - 8.4 g/dL Final     Albumin   Date Value Ref Range Status   07/03/2019 4.5 3.5 - 5.2 g/dL Final     Total Bilirubin   Date Value Ref Range Status   07/03/2019 0.8 0.1 - 1.0 mg/dL Final     Comment:     For infants and newborns, interpretation of results should be based  on gestational age, weight and in agreement with clinical  observations.  Premature Infant recommended reference ranges:  Up to 24 hours.............<8.0 mg/dL  Up to 48 hours............<12.0 mg/dL  3-5 days..................<15.0 mg/dL  6-29 days.................<15.0 mg/dL       Alkaline Phosphatase   Date Value Ref Range Status   07/03/2019 47 (L) 55 - 135 U/L Final     AST   Date Value Ref Range Status   07/03/2019 51 (H) 10 - 40 U/L Final     ALT   Date Value Ref Range Status   07/03/2019 98 (H) 10 - 44 U/L Final     Anion Gap   Date Value Ref Range Status   03/09/2020 7 (L) 8 - 16 mmol/L Final     eGFR if    Date Value Ref Range Status   03/09/2020 >60.0 >60 mL/min/1.73 m^2 Final     eGFR if non    Date Value Ref Range Status   03/09/2020 >60.0 >60 mL/min/1.73 m^2 Final     Comment:     Calculation used to obtain the  estimated glomerular filtration  rate (eGFR) is the CKD-EPI equation.            Lab Results   Component Value Date    HGBA1C 6.4 (H) 06/03/2020         ASSESSMENT     1. Type 2 diabetes mellitus with microalbuminuria, without long-term current use of insulin  dulaglutide (TRULICITY) 0.75 mg/0.5 mL PnIj    LANTUS SOLOSTAR U-100 INSULIN glargine 100 units/mL (3mL) SubQ pen    Ambulatory referral/consult to Optometry    Hemoglobin A1C   2. Hypertension, well controlled     3. Hypercholesterolemia  Lipid Panel           Plan     Mark Rivera is a 44 y.o. male with  1. Type 2 diabetes mellitus with microalbuminuria, without long-term current use of insulin  Status:controlled, going to try adding GLP-1A to reduce insulin requirement  A1c Goal: <7  Meds:    Continue: metformin 1000mg BID and dapaglifozin 5mg daily              Changes: start trulicity .75mg weekly, decrease lantus to 10 units dailuy  HTN: controlled continued amlodipine-benazapril  ASCVD: on atorvastatin 20, will recheck in 3 months  Micro: +, on ACE-I  Foot: 06/11/2020  Repeat in 12 months  Eye: referral placed  Lifestyle: Recommend at least 7% weight loss, at least 150 mins moderate intensity exercise/week, and 8 hours of sleep nightly    - dulaglutide (TRULICITY) 0.75 mg/0.5 mL PnIj; Inject 0.5 mLs (0.75 mg total) into the skin every 7 days.  Dispense: 4 Syringe; Refill: 1  - LANTUS SOLOSTAR U-100 INSULIN glargine 100 units/mL (3mL) SubQ pen; Inject 10 Units into the skin every evening.  Dispense: 15 mL; Refill: 1  - Ambulatory referral/consult to Optometry; Future  - Hemoglobin A1C; Future    2. Hypertension,   Continue medication today. Check at home 2-3x weekly and will reassess at next visit    3. Hypercholesterolemia  Recently started atorvastatin 20mg daily  Will recheck prior to next appt.  - Lipid Panel; Future      Yonas Najera MD

## 2020-07-30 ENCOUNTER — HOSPITAL ENCOUNTER (OUTPATIENT)
Dept: RADIOLOGY | Facility: HOSPITAL | Age: 45
Discharge: HOME | End: 2020-07-30
Attending: PHYSICIAN ASSISTANT
Payer: COMMERCIAL

## 2020-07-30 ENCOUNTER — OFFICE VISIT (OUTPATIENT)
Dept: HEPATOLOGY | Facility: CLINIC | Age: 45
End: 2020-07-30
Payer: COMMERCIAL

## 2020-07-30 ENCOUNTER — PROCEDURE VISIT (OUTPATIENT)
Dept: HEPATOLOGY | Facility: CLINIC | Age: 45
End: 2020-07-30
Payer: COMMERCIAL

## 2020-07-30 VITALS
SYSTOLIC BLOOD PRESSURE: 136 MMHG | WEIGHT: 179.88 LBS | HEART RATE: 79 BPM | BODY MASS INDEX: 28.91 KG/M2 | HEIGHT: 66 IN | OXYGEN SATURATION: 98 % | DIASTOLIC BLOOD PRESSURE: 102 MMHG

## 2020-07-30 DIAGNOSIS — K76.0 NAFLD (NONALCOHOLIC FATTY LIVER DISEASE): ICD-10-CM

## 2020-07-30 DIAGNOSIS — R16.0 LIVER MASSES: ICD-10-CM

## 2020-07-30 DIAGNOSIS — R16.0 LIVER MASS: ICD-10-CM

## 2020-07-30 DIAGNOSIS — I10 HYPERTENSION, WELL CONTROLLED: ICD-10-CM

## 2020-07-30 DIAGNOSIS — E11.29 TYPE 2 DIABETES MELLITUS WITH MICROALBUMINURIA, WITHOUT LONG-TERM CURRENT USE OF INSULIN: ICD-10-CM

## 2020-07-30 DIAGNOSIS — E78.00 HYPERCHOLESTEROLEMIA: ICD-10-CM

## 2020-07-30 DIAGNOSIS — R80.9 TYPE 2 DIABETES MELLITUS WITH MICROALBUMINURIA, WITHOUT LONG-TERM CURRENT USE OF INSULIN: ICD-10-CM

## 2020-07-30 DIAGNOSIS — K76.0 NAFLD (NONALCOHOLIC FATTY LIVER DISEASE): Primary | ICD-10-CM

## 2020-07-30 DIAGNOSIS — R79.89 ELEVATED FERRITIN: ICD-10-CM

## 2020-07-30 DIAGNOSIS — R74.8 ELEVATED LIVER ENZYMES: ICD-10-CM

## 2020-07-30 DIAGNOSIS — E66.3 OVERWEIGHT (BMI 25.0-29.9): ICD-10-CM

## 2020-07-30 PROBLEM — R93.2 ABNORMAL MRI, LIVER: Status: RESOLVED | Noted: 2019-05-22 | Resolved: 2020-07-30

## 2020-07-30 PROCEDURE — 99999 PR PBB SHADOW E&M-EST. PATIENT-LVL IV: ICD-10-PCS | Mod: PBBFAC,,, | Performed by: NURSE PRACTITIONER

## 2020-07-30 PROCEDURE — 3080F PR MOST RECENT DIASTOLIC BLOOD PRESSURE >= 90 MM HG: ICD-10-PCS | Mod: CPTII,S$GLB,, | Performed by: NURSE PRACTITIONER

## 2020-07-30 PROCEDURE — 99214 PR OFFICE/OUTPT VISIT, EST, LEVL IV, 30-39 MIN: ICD-10-PCS | Mod: S$GLB,,, | Performed by: NURSE PRACTITIONER

## 2020-07-30 PROCEDURE — 3080F DIAST BP >= 90 MM HG: CPT | Mod: CPTII,S$GLB,, | Performed by: NURSE PRACTITIONER

## 2020-07-30 PROCEDURE — 99999 PR PBB SHADOW E&M-EST. PATIENT-LVL IV: CPT | Mod: PBBFAC,,, | Performed by: NURSE PRACTITIONER

## 2020-07-30 PROCEDURE — 76700 US EXAM ABDOM COMPLETE: CPT | Mod: 26,,, | Performed by: RADIOLOGY

## 2020-07-30 PROCEDURE — 3044F PR MOST RECENT HEMOGLOBIN A1C LEVEL <7.0%: ICD-10-PCS | Mod: CPTII,S$GLB,, | Performed by: NURSE PRACTITIONER

## 2020-07-30 PROCEDURE — 3008F BODY MASS INDEX DOCD: CPT | Mod: CPTII,S$GLB,, | Performed by: NURSE PRACTITIONER

## 2020-07-30 PROCEDURE — 3008F PR BODY MASS INDEX (BMI) DOCUMENTED: ICD-10-PCS | Mod: CPTII,S$GLB,, | Performed by: NURSE PRACTITIONER

## 2020-07-30 PROCEDURE — 91200 FIBROSCAN (VIBRATION CONTROLLED TRANSIENT ELASTOGRAPHY): ICD-10-PCS | Mod: S$GLB,,, | Performed by: NURSE PRACTITIONER

## 2020-07-30 PROCEDURE — 3075F PR MOST RECENT SYSTOLIC BLOOD PRESS GE 130-139MM HG: ICD-10-PCS | Mod: CPTII,S$GLB,, | Performed by: NURSE PRACTITIONER

## 2020-07-30 PROCEDURE — 3044F HG A1C LEVEL LT 7.0%: CPT | Mod: CPTII,S$GLB,, | Performed by: NURSE PRACTITIONER

## 2020-07-30 PROCEDURE — 3075F SYST BP GE 130 - 139MM HG: CPT | Mod: CPTII,S$GLB,, | Performed by: NURSE PRACTITIONER

## 2020-07-30 PROCEDURE — 76700 US ABDOMEN COMPLETE: ICD-10-PCS | Mod: 26,,, | Performed by: RADIOLOGY

## 2020-07-30 PROCEDURE — 99214 OFFICE O/P EST MOD 30 MIN: CPT | Mod: S$GLB,,, | Performed by: NURSE PRACTITIONER

## 2020-07-30 PROCEDURE — 91200 LIVER ELASTOGRAPHY: CPT | Mod: S$GLB,,, | Performed by: NURSE PRACTITIONER

## 2020-07-30 PROCEDURE — 76700 US EXAM ABDOM COMPLETE: CPT | Mod: TC

## 2020-07-30 NOTE — PROCEDURES
FibroScan (Vibration Controlled Transient Elastography)    Date/Time: 7/30/2020 10:15 AM  Performed by: Carmen Olvera NP  Authorized by: Cameron Santana PA-C     Diagnosis:  NAFLD    Probe:  M    Universal Protocol: Patient's identity, procedure and site were verified, confirmatory pause was performed.  Discussed procedure including risks and potential complications.  Questions answered.  Patient verbalizes understanding and wishes to proceed with VCTE.     Procedure: After providing explanations of the procedure, patient was placed in the supine position with right arm in maximum abduction to allow optimal exposure of right lateral abdomen.  Patient was briefly assessed, Testing was performed in the mid-axillary location, 50Hz Shear Wave pulses were applied and the resulting Shear Wave and Propagation Speed detected with a 3.5 MHz ultrasonic signal, using the FibroScan probe, Skin to liver capsule distance and liver parenchyma were accessed during the entire examination with the FibroScan probe, Patient was instructed to breathe normally and to abstain from sudden movements during the procedure, allowing for random measurements of liver stiffness. At least 10 Shear Waves were produced, Individual measurements of each Shear Wave were calculated.  Patient tolerated the procedure well with no complications.  Meets discharge criteria as was dismissed.  Rates pain 0 out of 10.  Patient will follow up with ordering provider to review results.      Findings  Median liver stiffness score:  3.3  CAP Reading: dB/m:  286    IQR/med %:  9  Interpretation  Fibrosis interpretation is based on medial liver stiffness - Kilopascal (kPa).    Fibrosis Stage:  F 0-1  Steatosis interpretation is based on controlled attenuation parameter - (dB/m).    Steatosis Grade:  S2

## 2020-07-30 NOTE — PROGRESS NOTES
[facility] - Established Patient    Last Clinic Visit: 1/16/20    CHIEF COMPLAINT: Follow-up for fatty liver, liver mass    Visit completed with .     HPI: This is a 44 y.o. White male here for follow-up for fatty liver and liver mass.  Previously seen by SHELTON Roach; new patient to me.     Initially referred for elevated liver enzymes.  Transaminases up since 7/2018, ALT>AST.    Serological workup has been negative for Ez's, alpha-1 antitrypsin deficiency, autoimmune etiology, and viral hepatitis. Ferritin elevated, >400 though iron sat normal. HH DNA analysis negative for C282Y and H63D genes.     Fibroscan 7/2019:  KPa 5.0, F0-F1, no-mild fibrosis; , S3 or >67% steatosis   Repeated today - still F0-F1, S2    He had an US 3/2019 that showed an indeterminate 2.2 cm area, ? Mass.    Follow-up imaging:  -TPCT 3/2019 - 1.8 cm nodular in R lobe, indeterminate  -MRI at outside facility ([facility]) - 1.8 cm lesion in R lobe, ? Cavernous hemangioma though still indeterminate  -MRI 1/2020 - nonspecific 2.5 cm lesion in R hepatic lobe. No concerning features to suggest HCC.   *AFP normal    Reviewed at IR conference-- stable 2.5 cm lesion, recommendation for surveillance with US. Had US today, results pending.     Updates on risk factors for fatty liver:     · Weight -- Body mass index is 29.04 kg/m². Weight is down ~10 lb with dietary changes.                        · Dyslipidemia -- previously elevated, now on statin                               · Insulin resistance / diabetes -- yes, HgbA1c down to 6.4 (was 13.9 earlier this year)          · Hypertension -- high today   · Alcohol use -- none now (h/o heavy alcohol use)    He feels well, no complaints.   Denies symptoms of hepatic decompensation including jaundice, ascites, cognitive problems to suggest hepatic encephalopathy, or GI bleeding.            Review of patient's allergies indicates:  No Known Allergies     Current  "Outpatient Medications on File Prior to Visit   Medication Sig Dispense Refill    atorvastatin (LIPITOR) 20 MG tablet Take 1 tablet (20 mg total) by mouth once daily. 90 tablet 3    BD ULTRA-FINE ORIG PEN NEEDLE 29 gauge x 1/2" Ndle       dapagliflozin (FARXIGA) 5 mg Tab tablet Take 1 tablet (5 mg total) by mouth once daily. 90 tablet 3    dulaglutide (TRULICITY) 0.75 mg/0.5 mL PnIj Inject 0.5 mLs (0.75 mg total) into the skin every 7 days. 4 Syringe 1    ketoconazole (NIZORAL) 2 % cream Apply topically once daily. 60 g 3    LANTUS SOLOSTAR U-100 INSULIN glargine 100 units/mL (3mL) SubQ pen Inject 10 Units into the skin every evening. 15 mL 1    metFORMIN (GLUCOPHAGE) 1000 MG tablet Take 1 tablet (1,000 mg total) by mouth 2 (two) times daily with meals. 180 tablet 3    MICROLET LANCET Misc       tiZANidine (ZANAFLEX) 4 MG tablet Take 1 tablet (4 mg total) by mouth nightly as needed. Do not take with alcohol 25 tablet 0    TRUE METRIX GLUCOSE METER Misc USE AS DIRECTED      TRUE METRIX GLUCOSE TEST STRIP Strp       amlodipine-benazepril (LOTREL) 10-40 mg per capsule Take 1 capsule by mouth once daily. 90 capsule 1     No current facility-administered medications on file prior to visit.          PMHX:  has a past medical history of Hypertension, Hypertriglyceridemia (2018), and Uncontrolled type 2 diabetes mellitus with hyperglycemia (3/9/2020).    PSHX:  has no past surgical history on file.    FAMILY HISTORY:   Family History   Problem Relation Age of Onset    Diabetes Mother     Hypertension Father     Cirrhosis Neg Hx        SOCIAL HISTORY:   Social History     Tobacco Use   Smoking Status Former Smoker    Packs/day: 1.00    Years: 25.00    Pack years: 25.00    Start date:     Quit date: 2018    Years since quittin.2   Smokeless Tobacco Never Used       Social History     Substance and Sexual Activity   Alcohol Use Yes    Comment: socially       Social History     Substance " and Sexual Activity   Drug Use No    Comment: remote h/o cocaine use in 20's         Review of Systems   Constitutional: Negative for appetite change, chills, fatigue, fever and unexpected weight change.   Eyes: Negative for visual disturbance.   Respiratory: Negative for cough and shortness of breath.    Cardiovascular: Negative for chest pain, palpitations and leg swelling.   Gastrointestinal: Negative for abdominal distention, abdominal pain, blood in stool, constipation, diarrhea, nausea and vomiting. No change in stool color.  Genitourinary: Negative for dysuria and hematuria. Denies dark urine.   Musculoskeletal: Negative for arthralgias, gait problem, joint swelling and myalgias.   Skin: Negative for color change, rash and wound. Denies itching.   Neurological: Negative for dizziness, tremors, speech difficulty, and headaches.   Hematological: Does not bruise/bleed easily.   Psychiatric/Behavioral: Negative for confusion, decreased concentration and sleep disturbance. Denies memory loss. Denies depression. The patient is not nervous/anxious.        Physical Exam   Constitutional: Well-nourished. No distress. Alert and oriented to person, place, and time.  Eyes: No scleral icterus.   Cardiovascular: Normal rate, regular rhythm and normal heart sounds. No murmur heard.  Pulmonary/Chest: Respiratory effort and breath sounds normal. No respiratory distress.   Abdominal: Soft, non-tender. No distension; no ascites appreciated. There is no palpable hepatosplenomegaly. No hernia or mass.   Musculoskeletal: No edema.   Neurological: No tremor. Coordination and gait normal. No asterixis.    Skin: Skin is warm and dry. No rash or erythema. No jaundice. No telangiectasias or palmar erythema noted.  Psychiatric: Normal mood and affect. Speech, behavior, and thought content normal. No depression or anxiety noted.       BP (!) 136/102 (BP Location: Right arm, Patient Position: Sitting, BP Method: Medium (Automatic))    "Pulse 79   Ht 5' 6" (1.676 m)   Wt 81.6 kg (179 lb 14.3 oz)   SpO2 98%   BMI 29.04 kg/m²         LABS:  Lab Results   Component Value Date    WBC 7.69 05/22/2019    HGB 15.8 05/22/2019    HCT 48.5 05/22/2019     05/22/2019     03/09/2020    K 4.3 03/09/2020    CREATININE 1.2 03/09/2020    ALT 98 (H) 07/03/2019    AST 51 (H) 07/03/2019    ALKPHOS 47 (L) 07/03/2019    BILITOT 0.8 07/03/2019    BILIDIR 0.3 07/03/2019    ALBUMIN 4.5 07/03/2019    INR 0.9 04/19/2018    AFP 3.6 07/03/2019    SMOOTHMUSCAB Negative 1:40 06/12/2019    ANASCREEN Negative <1:160 06/12/2019    FERRITIN 434 (H) 05/22/2019    FESATURATED 23 05/22/2019    EXZCC3UUZIHT MM 07/03/2019    DNBZH4TGOHWS 104 07/03/2019    CERULOPLSM 28.0 07/03/2019    CHOL 251 (H) 12/05/2018    TRIG 343 (H) 12/05/2018    LDLCALC 141.4 12/05/2018    HGBA1C 6.4 (H) 06/03/2020       Hepatitis A Antibody IgG   Date Value Ref Range Status   07/03/2019 Positive (A)  Final       Hepatitis B Surface Ag   Date Value Ref Range Status   03/20/2019 Negative  Final     Hep B Core Total Ab   Date Value Ref Range Status   07/03/2019 Negative  Final     Hep B S Ab   Date Value Ref Range Status   07/03/2019 Negative  Final     Hepatitis C Ab   Date Value Ref Range Status   03/20/2019 Negative  Final     Immunization History   Administered Date(s) Administered    Influenza - Quadrivalent - MDCK - PF 09/25/2019    Influenza - Quadrivalent - PF (6 months and older) 11/09/2018, 02/28/2020    Pneumococcal Conjugate - 13 Valent 04/20/2018    Td - PF (ADULT) 06/11/2020          DIAGNOSTIC STUDIES:   MRI - 1/9/20  FINDINGS:  Liver: The liver is enlarged in size measuring 18.9 cm and demonstrates intense signal dropout on out of phase imaging, suggesting diffuse hepatic steatosis.  There is a 2.5 x 1.9 cm T2 hypointense/T1 hyperintense lesion in the right hepatic lobe (coronal series 3, image 21).  This lesion does not demonstrate contrast enhancement, washout, or pseudo " capsule.  No cystic component or calcifications.     Hepatic and portal veins are patent.     Biliary: Gallbladder is unremarkable.  No intrahepatic or extrahepatic biliary dilatation.     Pancreas: No masses.  No pancreatic ductal dilatation.     Spleen: The spleen is normal in size with no focal lesions.     Adrenal glands: Unremarkable.     Kidneys: No renal masses are hydronephrosis.  Multiple rounded T2 hyperintense lesions throughout both kidneys, favoring simple renal cysts.     Miscellaneous: Visualized bowel loops are unremarkable.  No evidence for lymphadenopathy.     Osseous structures are unremarkable.     Impression:     Nonspecific 2.5 x 1.9 cm T2 hypointense/T1 hyperintense lesion in the right hepatic lobe which does not demonstrate contrast enhancement, washout, or pseudo capsule.  Considerations include hepatic adenoma with possible recent hemorrhage, atypical hemangioma, hematoma if clinical history includes trauma or recent biopsy, and focal nodular hyperplasia.  Continued follow-up to document stability or resolution of this finding is recommended.     Hepatomegaly with diffuse hepatic steatosis.     Multiple simple renal cysts.          ASSESSMENT / PLAN:  44 y.o. White male with:    1. NAFLD  -- Fibroscan reviewed. Significant steatosis, but still no fibrosis (F0-F1).  -- Need updated labs to check liver enzymes. Anticipate improvement with ongoing weight loss and good control of blood sugar/cholesterol    -- Discussed importance of lifestyle modifications including healthy diet and adequate physical activity to achieve and maintain ideal body weight.   -- Low carbohydrate, low sugar diet.  -- Maintain control of blood pressure, cholesterol, and blood sugar as these are risk factors for fatty liver    *If statins are being considered for HLD, statins are safe in patients with liver disease. Statins can be used to treat dyslipidemia in patients with both NAFLD and GRIMES.    2. Body mass index is  29.04 kg/m²., HTN, HLD, DM    3. Liver lesion   -- Previously stable and without concerning features; AFP normal  -- Awaiting results from US today. Can re-review at IR conference if needed.         Hepatic panel added to labs next month.  Return to clinic in 1 year.       Orders Placed This Encounter   Procedures    Hepatic function panel         EDUCATION / DISCUSSION:   We discussed the manifestations of fatty liver. At this time there is no FDA approved therapy for fatty liver. The patient and I discussed the importance of lifestyle modifications such as diet, exercise, and weight loss for management of fatty liver. We reviewed modification of risk factors such as hypertension, hyperlipidemia, and diabetes mellitus / impaired fasting glucose. Discussed that excessive alcohol consumption can also cause fatty liver. We discussed a low carb, low sugar diet and a goal of 30 minutes of exercise 5 times per week. Patient is aware that fatty liver can progress to steatohepatitis and possibly to cirrhosis, putting one at increased risk for liver cancer or liver failure.           Thank you for allowing me to participate in the care of Mark Olvera, OMARIP-C  Hepatology and Liver Transplant

## 2020-07-30 NOTE — PATIENT INSTRUCTIONS
1. Will let you know results from ultrasound today  2. Labs to recheck liver enzymes in Sept  3. Continue to maintain good control of blood sugar  4. Follow-up in 1 year       Fatty liver    There is no FDA approved therapy for non-alcoholic fatty liver disease (NAFLD); therefore, lifestyle changes are important:  1. Weight loss goal of 10 lbs  2. Low carb/sugar, high protein diet.   3. Exercise, 5 days per week, 30 minutes per day, as tolerated  4. Recommend good control of cholesterol, blood pressure, blood sugar levels (as these are risk factors for fatty liver)     Let us know if you are interested in a referral to [facility].    In some people, fatty liver can progress to steatohepatitis (inflamatory fatty liver) and possibly to cirrhosis, putting one at increased risk for liver cancer and liver failure. Lifestyle changes can help to decrease this risk.     Ask about our fatty liver/GRIMES clinical trials if you have fibrosis / scar tissue related to fatty liver.      Online resources:    Websites with information about fatty liver and inflammation related to fatty liver (GRIMES): www.XipintrKelway.Seeo and www.[employer] support group with tips and recipes:   Non-Alcoholic Fatty Liver Disease (NAFLD) Diet & Nutrition Support     [facility]: Nonalcoholic Fatty Liver Disease      Tips for low/moderate carbohydrate diet:  3 meals a day made up of the following:  -- Unlimited green vegetables, tomatoes, mushrooms, spaghetti squash, cauliflower, meat, poultry, seafood, eggs and hard cheeses.   -- Milk and plain yogurt  -- Dressings, seasonings, condiments, etc should have less than 2 g sugars.   -- Beans (1-1.5 cups) or nuts (1/4 cup): can have 1 x a day.   -- 1-2 servings of citrus fruits, berries, pineapple or melon a day (1/2 cup)  -- Avoid fried foods    *No grains, rice, pasta, potatoes, bread, corn, peas, oatmeal, grits, tortillas, crackers, chips    *No soda, sweet tea,  juices or lemonade    *Try to limit your carb intake to LESS than 30-45 grams of carbs with a meal or LESS than 5-10 grams with any snack (total of any snack foods eaten during that time). Can use [employer] Pal sharifa to add up your carbs throughout the day. Avoid beverages with calories or carbohydrates.     Try www.Sp[employer]fashion.[employer] for recipes (moderate carb intake)

## 2020-08-01 DIAGNOSIS — R16.0 LIVER MASS: Primary | ICD-10-CM

## 2020-08-03 ENCOUNTER — TELEPHONE (OUTPATIENT)
Dept: HEPATOLOGY | Facility: CLINIC | Age: 45
End: 2020-08-03

## 2020-08-03 NOTE — TELEPHONE ENCOUNTER
----- Message from Carmen Olvera NP sent at 8/1/2020  2:21 PM CDT -----  Please schedule US in 6 months. Thanks!

## 2020-08-16 DIAGNOSIS — R80.9 TYPE 2 DIABETES MELLITUS WITH MICROALBUMINURIA, WITHOUT LONG-TERM CURRENT USE OF INSULIN: ICD-10-CM

## 2020-08-16 DIAGNOSIS — E11.29 TYPE 2 DIABETES MELLITUS WITH MICROALBUMINURIA, WITHOUT LONG-TERM CURRENT USE OF INSULIN: ICD-10-CM

## 2020-08-16 NOTE — TELEPHONE ENCOUNTER
Care Due:                  Date            Visit Type   Department     Provider  --------------------------------------------------------------------------------                                ESTABLISHED   [facility] INTERNAL  Last Visit: 06-      PATIENT      MEDICINE       TRIN[facility] BETTS                                           [facility] INTERNAL  Next Visit: 09-      None         [facility]       TRINITY BETTS                                                            Last  Test          Frequency    Reason                     Performed    Due Date  --------------------------------------------------------------------------------    ALT.........  12 months..  atorvastatin.............  07- 06-    AST.........  12 months..  atorvastatin.............  07- 06-    HDL.........  12 months..  atorvastatin.............  12- 11-    LDL.........  12 months..  atorvastatin.............  12- 11-    Total         12 months..  atorvastatin.............  12- 11-  Cholesterol.    Triglyceride  12 months..  atorvastatin.............  12- 11-  s...........    Powered by gDine. Reference number: [id number]. 8/16/2020 3:31:44 AM CDT

## 2020-08-17 RX ORDER — DULAGLUTIDE 0.75 MG/.5ML
INJECTION, SOLUTION SUBCUTANEOUS
Qty: 6 ML | Refills: 1 | Status: SHIPPED | OUTPATIENT
Start: 2020-08-17 | End: 2021-02-02

## 2020-08-17 NOTE — PROGRESS NOTES
Refill Routing Note   Medication(s) are not appropriate for processing by [facility]:       - Medication is a new start (<3 months)        Medication Therapy Plan: CDMR; Future labs scheduled; Trulicity started 6/11/20 by you; Defer to you in setting of new start  Medication reconciliation completed: No      Automatic Epic Generated Protocol Data:        Requested Prescriptions   Pending Prescriptions Disp Refills    TRULICITY 0.75 mg/0.5 mL pen injector [Pharmacy Med Name: TRULICITY 0.75MG/0.5ML SDP 4X0.5ML] 6 mL 1     Sig: ADMINISTER 0.5 ML(0.75 MG) UNDER THE SKIN EVERY 7 DAYS       Endocrinology:  Diabetes - GLP-1 Receptor Agonists Passed - 8/16/2020  3:31 AM        Passed - Patient is at least 18 years old        Passed - Office visit in past 12 months or future 90 days.     Recent Outpatient Visits            2 weeks ago NAFLD (nonalcoholic fatty liver disease)    [facility] 1[facility] Carmen Olvera NP    2 months ago Type 2 diabetes mellitus with microalbuminuria, without long-term current use of insulin    [facility] Yonas Najera MD    5 months ago Uncontrolled type 2 diabetes mellitus with hyperglycemia    [facility] Yonas Najera MD    5 months ago Type 2 diabetes mellitus with hyperglycemia, without long-term current use of insulin    [facility] Carmine Garcia MD    7 months ago NAFLD (nonalcoholic fatty liver disease)    [facility] 1st Fl Cameron Santana PA-C          Future Appointments              In 3 weeks LAB, APPOINTMENT [facility]    In 1 month MD [facility], [facility]    In 5 months [facility] [id number] MASTER [facility]                Passed - HBA1C is 7.9 or below and within 180 days     Hemoglobin A1C   Date Value Ref Range Status   06/03/2020 6.4 (H) 4.0 - 5.6 % Final     Comment:      ADA Screening Guidelines:  5.7-6.4%  Consistent with prediabetes  >or=6.5%  Consistent with diabetes  High levels of fetal hemoglobin interfere with the HbA1C  assay. Heterozygous hemoglobin variants (HbS, HgC, etc)do  not significantly interfere with this assay.   However, presence of multiple variants may affect accuracy.     03/09/2020 13.9 (H) 4.0 - 5.6 % Final     Comment:     ADA Screening Guidelines:  5.7-6.4%  Consistent with prediabetes  >or=6.5%  Consistent with diabetes  High levels of fetal hemoglobin interfere with the HbA1C  assay. Heterozygous hemoglobin variants (HbS, HgC, etc)do  not significantly interfere with this assay.   However, presence of multiple variants may affect accuracy.     04/19/2018 5.4 4.0 - 5.6 % Final     Comment:     According to ADA guidelines, hemoglobin A1c <7.0% represents  optimal control in non-pregnant diabetic patients. Different  metrics may apply to specific patient populations.   Standards of Medical Care in Diabetes-2016.  For the purpose of screening for the presence of diabetes:  <5.7%     Consistent with the absence of diabetes  5.7-6.4%  Consistent with increasing risk for diabetes   (prediabetes)  >or=6.5%  Consistent with diabetes  Currently, no consensus exists for use of hemoglobin A1c  for diagnosis of diabetes for children.  This Hemoglobin A1c assay has significant interference with fetal   hemoglobin   (HbF). The results are invalid for patients with abnormal amounts of   HbF,   including those with known Hereditary Persistence   of Fetal Hemoglobin. Heterozygous hemoglobin variants (HbAS, HbAC,   HbAD, HbAE, HbA2) do not significantly interfere with this assay;   however, presence of multiple variants in a sample may impact the %   interference.                      Appointments  past 12m or future 3m with PCP    Date Provider   Last Visit   6/11/2020 Yonas Najera MD   Next Visit   9/17/2020 Yonas Najera MD   ED visits in past 90 days: 0      Note composed:10:45 AM 08/17/2020

## 2020-09-03 ENCOUNTER — PATIENT OUTREACH (OUTPATIENT)
Dept: ADMINISTRATIVE | Facility: HOSPITAL | Age: 45
End: 2020-09-03

## 2020-09-09 ENCOUNTER — LAB VISIT (OUTPATIENT)
Dept: LAB | Facility: HOSPITAL | Age: 45
End: 2020-09-09
Attending: INTERNAL MEDICINE
Payer: COMMERCIAL

## 2020-09-09 DIAGNOSIS — K76.0 NAFLD (NONALCOHOLIC FATTY LIVER DISEASE): ICD-10-CM

## 2020-09-09 DIAGNOSIS — E11.29 TYPE 2 DIABETES MELLITUS WITH MICROALBUMINURIA, WITHOUT LONG-TERM CURRENT USE OF INSULIN: ICD-10-CM

## 2020-09-09 DIAGNOSIS — E78.00 HYPERCHOLESTEROLEMIA: ICD-10-CM

## 2020-09-09 DIAGNOSIS — R80.9 TYPE 2 DIABETES MELLITUS WITH MICROALBUMINURIA, WITHOUT LONG-TERM CURRENT USE OF INSULIN: ICD-10-CM

## 2020-09-09 LAB
ALBUMIN SERPL BCP-MCNC: 4.1 G/DL (ref 3.5–5.2)
ALP SERPL-CCNC: 55 U/L (ref 55–135)
ALT SERPL W/O P-5'-P-CCNC: 19 U/L (ref 10–44)
AST SERPL-CCNC: 17 U/L (ref 10–40)
BILIRUB DIRECT SERPL-MCNC: 0.1 MG/DL (ref 0.1–0.3)
BILIRUB SERPL-MCNC: 0.3 MG/DL (ref 0.1–1)
CHOLEST SERPL-MCNC: 187 MG/DL (ref 120–199)
CHOLEST/HDLC SERPL: 4.9 {RATIO} (ref 2–5)
ESTIMATED AVG GLUCOSE: 111 MG/DL (ref 68–131)
HBA1C MFR BLD HPLC: 5.5 % (ref 4–5.6)
HDLC SERPL-MCNC: 38 MG/DL (ref 40–75)
HDLC SERPL: 20.3 % (ref 20–50)
LDLC SERPL CALC-MCNC: 95.8 MG/DL (ref 63–159)
NONHDLC SERPL-MCNC: 149 MG/DL
PROT SERPL-MCNC: 7.2 G/DL (ref 6–8.4)
TRIGL SERPL-MCNC: 266 MG/DL (ref 30–150)

## 2020-09-09 PROCEDURE — 80076 HEPATIC FUNCTION PANEL: CPT

## 2020-09-09 PROCEDURE — 80061 LIPID PANEL: CPT

## 2020-09-09 PROCEDURE — 83036 HEMOGLOBIN GLYCOSYLATED A1C: CPT

## 2020-09-09 PROCEDURE — 36415 COLL VENOUS BLD VENIPUNCTURE: CPT

## 2020-10-07 ENCOUNTER — PATIENT OUTREACH (OUTPATIENT)
Dept: ADMINISTRATIVE | Facility: HOSPITAL | Age: 45
End: 2020-10-07

## 2020-10-29 ENCOUNTER — OFFICE VISIT (OUTPATIENT)
Dept: INTERNAL MEDICINE | Facility: CLINIC | Age: 45
End: 2020-10-29
Payer: COMMERCIAL

## 2020-10-29 ENCOUNTER — IMMUNIZATION (OUTPATIENT)
Dept: PHARMACY | Facility: CLINIC | Age: 45
End: 2020-10-29
Payer: COMMERCIAL

## 2020-10-29 VITALS
HEIGHT: 67 IN | DIASTOLIC BLOOD PRESSURE: 88 MMHG | SYSTOLIC BLOOD PRESSURE: 130 MMHG | WEIGHT: 169 LBS | BODY MASS INDEX: 26.53 KG/M2

## 2020-10-29 DIAGNOSIS — E11.29 TYPE 2 DIABETES MELLITUS WITH MICROALBUMINURIA, WITHOUT LONG-TERM CURRENT USE OF INSULIN: Primary | ICD-10-CM

## 2020-10-29 DIAGNOSIS — I10 ESSENTIAL HYPERTENSION, BENIGN: ICD-10-CM

## 2020-10-29 DIAGNOSIS — R80.9 TYPE 2 DIABETES MELLITUS WITH MICROALBUMINURIA, WITHOUT LONG-TERM CURRENT USE OF INSULIN: Primary | ICD-10-CM

## 2020-10-29 DIAGNOSIS — Z23 IMMUNIZATION DUE: ICD-10-CM

## 2020-10-29 PROCEDURE — 3044F HG A1C LEVEL LT 7.0%: CPT | Mod: CPTII,S$GLB,, | Performed by: INTERNAL MEDICINE

## 2020-10-29 PROCEDURE — 99999 PR PBB SHADOW E&M-EST. PATIENT-LVL IV: CPT | Mod: PBBFAC,,, | Performed by: INTERNAL MEDICINE

## 2020-10-29 PROCEDURE — 99214 OFFICE O/P EST MOD 30 MIN: CPT | Mod: S$GLB,,, | Performed by: INTERNAL MEDICINE

## 2020-10-29 PROCEDURE — 3044F PR MOST RECENT HEMOGLOBIN A1C LEVEL <7.0%: ICD-10-PCS | Mod: CPTII,S$GLB,, | Performed by: INTERNAL MEDICINE

## 2020-10-29 PROCEDURE — 99999 PR PBB SHADOW E&M-EST. PATIENT-LVL IV: ICD-10-PCS | Mod: PBBFAC,,, | Performed by: INTERNAL MEDICINE

## 2020-10-29 PROCEDURE — 3075F SYST BP GE 130 - 139MM HG: CPT | Mod: CPTII,S$GLB,, | Performed by: INTERNAL MEDICINE

## 2020-10-29 PROCEDURE — 3079F PR MOST RECENT DIASTOLIC BLOOD PRESSURE 80-89 MM HG: ICD-10-PCS | Mod: CPTII,S$GLB,, | Performed by: INTERNAL MEDICINE

## 2020-10-29 PROCEDURE — 99214 PR OFFICE/OUTPT VISIT, EST, LEVL IV, 30-39 MIN: ICD-10-PCS | Mod: S$GLB,,, | Performed by: INTERNAL MEDICINE

## 2020-10-29 PROCEDURE — 3079F DIAST BP 80-89 MM HG: CPT | Mod: CPTII,S$GLB,, | Performed by: INTERNAL MEDICINE

## 2020-10-29 PROCEDURE — 3008F BODY MASS INDEX DOCD: CPT | Mod: CPTII,S$GLB,, | Performed by: INTERNAL MEDICINE

## 2020-10-29 PROCEDURE — 3075F PR MOST RECENT SYSTOLIC BLOOD PRESS GE 130-139MM HG: ICD-10-PCS | Mod: CPTII,S$GLB,, | Performed by: INTERNAL MEDICINE

## 2020-10-29 PROCEDURE — 3008F PR BODY MASS INDEX (BMI) DOCUMENTED: ICD-10-PCS | Mod: CPTII,S$GLB,, | Performed by: INTERNAL MEDICINE

## 2020-10-29 NOTE — PROGRESS NOTES
"     CHIEF COMPLAINT     Chief Complaint   Patient presents with    Follow-up     3 months.        HPI     Mark Rivera is a 44 y.o. male type 2 diabetes, fatty liver disease and hypertension here today for     Diabetes  Taking metformin 1000 twice a day, Farxiga 5 mg daily, Trulicity 0.75 weekly and Lantus 10 units nightly.  Blood pressures been very well controlled.  A.m. fastings around 100.  No postprandial sugars above 200.  Patient is up-to-date on secondary end-organ screening  On aspirin statin  Due for Pneumovax    Hypertension  Taking amlodipine benazepril 10 40.  Blood pressure runs 120s over 80s to 120s over low 90s.  No symptoms of hyper hypotension.    Personally Reviewed Patient's Medical, surgical, family and social hx. Changes updated in [facility].  Care Team updated in Epic    Review of Systems:  Review of Systems   Constitutional: Negative for appetite change.   Cardiovascular: Negative for leg swelling.   Neurological: Negative for dizziness, tremors and light-headedness.       Health Maintenance:   Reviewed with patient  Due for the following:      PHYSICAL EXAM     /88   Ht 5' 7" (1.702 m)   Wt 76.7 kg (169 lb)   BMI 26.47 kg/m²     Gen: Well Appearing, NAD  HEENT: PERR, EOMI  Neck: FROM, no thyromegaly, no cervical adenopathy  CVD: RRR, no M/R/G  Pulm: Normal work of breathing, CTAB, no wheezing  Abd:  Soft, NT, ND non TTP, no mass  MSK: no LE edema  Neuro: A&Ox3, gait normal, speech normal  Mood; Mood normal, behavior normal, thought process linear       LABS     Labs reviewed; Notable for  Lab Results   Component Value Date    CREATININE 1.2 03/09/2020    BUN 14 03/09/2020     03/09/2020    K 4.3 03/09/2020     03/09/2020    CO2 27 03/09/2020     Lab Results   Component Value Date    HGBA1C 5.5 09/09/2020         ASSESSMENT     1. Type 2 diabetes mellitus with microalbuminuria, without long-term current use of insulin  Hemoglobin A1C    Basic Metabolic Panel   2. Essential " hypertension, benign             Plan     Mark Rivera is a 44 y.o. male with type 2 diabetes with microalbuminuria, nonalcoholic fatty liver disease, hypertension  1. Type 2 diabetes mellitus with microalbuminuria, without long-term current use of insulin  - Hemoglobin A1C; Future  - Basic Metabolic Panel; Future  Status:  Over treating  A1c Goal:  Less than 7  Meds:    Continue:  Metformin 1000 b.i.d., Farxiga 5 mg and Trulicity 0.75 weekly              Changes:  Stop insulin  HTN:  At goal continue Lotrel 10 40  ASCVD:  On statins LDL at goal  Micro:  On ACE-inhibitor  Foot:  Up-to-date up-to-date  Eye:  Current  Lifestyle: Recommend at least 7% weight loss, at least 150 mins moderate intensity exercise/week, and 8 hours of sleep nightly    2. Essential hypertension, benign  Continue Lotrel 10 40    3. Immunization due  Pneumovax today      Will recheck A1c in 3 months follow-up with me in 6.  Yonas Najera MD

## 2020-11-04 ENCOUNTER — TELEPHONE (OUTPATIENT)
Dept: INTERNAL MEDICINE | Facility: CLINIC | Age: 45
End: 2020-11-04

## 2020-11-04 NOTE — TELEPHONE ENCOUNTER
----- Message from Asya Castro sent at 11/4/2020  3:04 PM CST -----  Contact: pharmacy/tan/[phone]  RX name:  the list of acting medication  What do they need to clarify:  what med's are the pt on   Comments: pharmacy would like a call back.     Please advise

## 2021-01-04 ENCOUNTER — PATIENT MESSAGE (OUTPATIENT)
Dept: ADMINISTRATIVE | Facility: HOSPITAL | Age: 46
End: 2021-01-04

## 2021-01-08 DIAGNOSIS — I10 HYPERTENSION, WELL CONTROLLED: ICD-10-CM

## 2021-01-08 RX ORDER — AMLODIPINE AND BENAZEPRIL HYDROCHLORIDE 10; 40 MG/1; MG/1
1 CAPSULE ORAL DAILY
Qty: 90 CAPSULE | Refills: 0 | Status: SHIPPED | OUTPATIENT
Start: 2021-01-08 | End: 2021-05-05 | Stop reason: SDUPTHER

## 2021-01-21 ENCOUNTER — LAB VISIT (OUTPATIENT)
Dept: LAB | Facility: HOSPITAL | Age: 46
End: 2021-01-21
Attending: INTERNAL MEDICINE
Payer: COMMERCIAL

## 2021-01-21 DIAGNOSIS — R80.9 TYPE 2 DIABETES MELLITUS WITH MICROALBUMINURIA, WITHOUT LONG-TERM CURRENT USE OF INSULIN: ICD-10-CM

## 2021-01-21 DIAGNOSIS — E11.29 TYPE 2 DIABETES MELLITUS WITH MICROALBUMINURIA, WITHOUT LONG-TERM CURRENT USE OF INSULIN: ICD-10-CM

## 2021-01-21 LAB
ANION GAP SERPL CALC-SCNC: 11 MMOL/L (ref 8–16)
BUN SERPL-MCNC: 14 MG/DL (ref 6–20)
CALCIUM SERPL-MCNC: 9.1 MG/DL (ref 8.7–10.5)
CHLORIDE SERPL-SCNC: 108 MMOL/L (ref 95–110)
CO2 SERPL-SCNC: 23 MMOL/L (ref 23–29)
CREAT SERPL-MCNC: 1 MG/DL (ref 0.5–1.4)
EST. GFR  (AFRICAN AMERICAN): >60 ML/MIN/1.73 M^2
EST. GFR  (NON AFRICAN AMERICAN): >60 ML/MIN/1.73 M^2
ESTIMATED AVG GLUCOSE: 120 MG/DL (ref 68–131)
GLUCOSE SERPL-MCNC: 112 MG/DL (ref 70–110)
HBA1C MFR BLD HPLC: 5.8 % (ref 4–5.6)
POTASSIUM SERPL-SCNC: 4 MMOL/L (ref 3.5–5.1)
SODIUM SERPL-SCNC: 142 MMOL/L (ref 136–145)

## 2021-01-21 PROCEDURE — 36415 COLL VENOUS BLD VENIPUNCTURE: CPT

## 2021-01-21 PROCEDURE — 80048 BASIC METABOLIC PNL TOTAL CA: CPT

## 2021-01-21 PROCEDURE — 83036 HEMOGLOBIN GLYCOSYLATED A1C: CPT

## 2021-01-31 DIAGNOSIS — E11.29 TYPE 2 DIABETES MELLITUS WITH MICROALBUMINURIA, WITHOUT LONG-TERM CURRENT USE OF INSULIN: ICD-10-CM

## 2021-01-31 DIAGNOSIS — R80.9 TYPE 2 DIABETES MELLITUS WITH MICROALBUMINURIA, WITHOUT LONG-TERM CURRENT USE OF INSULIN: ICD-10-CM

## 2021-02-02 RX ORDER — DULAGLUTIDE 0.75 MG/.5ML
INJECTION, SOLUTION SUBCUTANEOUS
Qty: 6 ML | Refills: 1 | Status: SHIPPED | OUTPATIENT
Start: 2021-02-02 | End: 2021-07-29

## 2021-02-05 ENCOUNTER — HOSPITAL ENCOUNTER (OUTPATIENT)
Dept: RADIOLOGY | Facility: HOSPITAL | Age: 46
Discharge: HOME | End: 2021-02-05
Attending: NURSE PRACTITIONER
Payer: COMMERCIAL

## 2021-02-05 DIAGNOSIS — R16.0 LIVER MASS: ICD-10-CM

## 2021-02-05 PROCEDURE — 76700 US EXAM ABDOM COMPLETE: CPT | Mod: 26,,, | Performed by: RADIOLOGY

## 2021-02-05 PROCEDURE — 76700 US EXAM ABDOM COMPLETE: CPT | Mod: TC

## 2021-02-05 PROCEDURE — 76700 US ABDOMEN COMPLETE: ICD-10-PCS | Mod: 26,,, | Performed by: RADIOLOGY

## 2021-02-13 ENCOUNTER — TELEPHONE (OUTPATIENT)
Dept: HEPATOLOGY | Facility: CLINIC | Age: 46
End: 2021-02-13

## 2021-02-13 DIAGNOSIS — K76.0 NAFLD (NONALCOHOLIC FATTY LIVER DISEASE): Primary | ICD-10-CM

## 2021-03-05 DIAGNOSIS — E11.9 TYPE 2 DIABETES MELLITUS WITHOUT COMPLICATION, WITHOUT LONG-TERM CURRENT USE OF INSULIN: ICD-10-CM

## 2021-03-08 RX ORDER — METFORMIN HYDROCHLORIDE 1000 MG/1
TABLET ORAL
Qty: 180 TABLET | Refills: 1 | Status: SHIPPED | OUTPATIENT
Start: 2021-03-08 | End: 2022-01-20

## 2021-04-16 ENCOUNTER — PATIENT MESSAGE (OUTPATIENT)
Dept: RESEARCH | Facility: HOSPITAL | Age: 46
End: 2021-04-16

## 2021-05-05 DIAGNOSIS — I10 HYPERTENSION, WELL CONTROLLED: ICD-10-CM

## 2021-05-05 RX ORDER — AMLODIPINE AND BENAZEPRIL HYDROCHLORIDE 10; 40 MG/1; MG/1
1 CAPSULE ORAL DAILY
Qty: 90 CAPSULE | Refills: 0 | Status: CANCELLED | OUTPATIENT
Start: 2021-05-05 | End: 2021-11-01

## 2021-05-05 RX ORDER — AMLODIPINE AND BENAZEPRIL HYDROCHLORIDE 10; 40 MG/1; MG/1
1 CAPSULE ORAL DAILY
Qty: 90 CAPSULE | Refills: 0 | Status: SHIPPED | OUTPATIENT
Start: 2021-05-05 | End: 2021-08-02

## 2021-05-19 DIAGNOSIS — E11.9 TYPE 2 DIABETES MELLITUS WITHOUT COMPLICATION: ICD-10-CM

## 2021-07-07 ENCOUNTER — PATIENT MESSAGE (OUTPATIENT)
Dept: ADMINISTRATIVE | Facility: HOSPITAL | Age: 46
End: 2021-07-07

## 2021-08-03 ENCOUNTER — PATIENT MESSAGE (OUTPATIENT)
Dept: ADMINISTRATIVE | Facility: HOSPITAL | Age: 46
End: 2021-08-03

## 2021-08-18 ENCOUNTER — HOSPITAL ENCOUNTER (OUTPATIENT)
Dept: RADIOLOGY | Facility: HOSPITAL | Age: 46
Discharge: HOME | End: 2021-08-18
Attending: NURSE PRACTITIONER
Payer: COMMERCIAL

## 2021-08-18 ENCOUNTER — OFFICE VISIT (OUTPATIENT)
Dept: HEPATOLOGY | Facility: CLINIC | Age: 46
End: 2021-08-18
Payer: COMMERCIAL

## 2021-08-18 VITALS
TEMPERATURE: 96 F | BODY MASS INDEX: 27.44 KG/M2 | RESPIRATION RATE: 18 BRPM | OXYGEN SATURATION: 97 % | HEART RATE: 72 BPM | WEIGHT: 174.81 LBS | SYSTOLIC BLOOD PRESSURE: 144 MMHG | DIASTOLIC BLOOD PRESSURE: 104 MMHG | HEIGHT: 67 IN

## 2021-08-18 DIAGNOSIS — K76.0 NAFLD (NONALCOHOLIC FATTY LIVER DISEASE): ICD-10-CM

## 2021-08-18 DIAGNOSIS — F10.11 H/O ALCOHOL ABUSE: ICD-10-CM

## 2021-08-18 DIAGNOSIS — E66.3 OVERWEIGHT (BMI 25.0-29.9): ICD-10-CM

## 2021-08-18 DIAGNOSIS — E11.29 TYPE 2 DIABETES MELLITUS WITH MICROALBUMINURIA, WITHOUT LONG-TERM CURRENT USE OF INSULIN: ICD-10-CM

## 2021-08-18 DIAGNOSIS — E78.1 HYPERTRIGLYCERIDEMIA: ICD-10-CM

## 2021-08-18 DIAGNOSIS — R16.0 LIVER MASS: ICD-10-CM

## 2021-08-18 DIAGNOSIS — I10 HYPERTENSION, WELL CONTROLLED: ICD-10-CM

## 2021-08-18 DIAGNOSIS — K76.0 NAFLD (NONALCOHOLIC FATTY LIVER DISEASE): Primary | ICD-10-CM

## 2021-08-18 DIAGNOSIS — R80.9 TYPE 2 DIABETES MELLITUS WITH MICROALBUMINURIA, WITHOUT LONG-TERM CURRENT USE OF INSULIN: ICD-10-CM

## 2021-08-18 PROCEDURE — 3044F PR MOST RECENT HEMOGLOBIN A1C LEVEL <7.0%: ICD-10-PCS | Mod: CPTII,S$GLB,, | Performed by: NURSE PRACTITIONER

## 2021-08-18 PROCEDURE — 3080F DIAST BP >= 90 MM HG: CPT | Mod: CPTII,S$GLB,, | Performed by: NURSE PRACTITIONER

## 2021-08-18 PROCEDURE — 99214 PR OFFICE/OUTPT VISIT, EST, LEVL IV, 30-39 MIN: ICD-10-PCS | Mod: S$GLB,,, | Performed by: NURSE PRACTITIONER

## 2021-08-18 PROCEDURE — 3008F BODY MASS INDEX DOCD: CPT | Mod: CPTII,S$GLB,, | Performed by: NURSE PRACTITIONER

## 2021-08-18 PROCEDURE — 76700 US EXAM ABDOM COMPLETE: CPT | Mod: 26,,, | Performed by: RADIOLOGY

## 2021-08-18 PROCEDURE — 3077F SYST BP >= 140 MM HG: CPT | Mod: CPTII,S$GLB,, | Performed by: NURSE PRACTITIONER

## 2021-08-18 PROCEDURE — 99999 PR PBB SHADOW E&M-EST. PATIENT-LVL III: ICD-10-PCS | Mod: PBBFAC,,, | Performed by: NURSE PRACTITIONER

## 2021-08-18 PROCEDURE — 3080F PR MOST RECENT DIASTOLIC BLOOD PRESSURE >= 90 MM HG: ICD-10-PCS | Mod: CPTII,S$GLB,, | Performed by: NURSE PRACTITIONER

## 2021-08-18 PROCEDURE — 1159F PR MEDICATION LIST DOCUMENTED IN MEDICAL RECORD: ICD-10-PCS | Mod: CPTII,S$GLB,, | Performed by: NURSE PRACTITIONER

## 2021-08-18 PROCEDURE — 3008F PR BODY MASS INDEX (BMI) DOCUMENTED: ICD-10-PCS | Mod: CPTII,S$GLB,, | Performed by: NURSE PRACTITIONER

## 2021-08-18 PROCEDURE — 1160F PR REVIEW ALL MEDS BY PRESCRIBER/CLIN PHARMACIST DOCUMENTED: ICD-10-PCS | Mod: CPTII,S$GLB,, | Performed by: NURSE PRACTITIONER

## 2021-08-18 PROCEDURE — 1126F AMNT PAIN NOTED NONE PRSNT: CPT | Mod: CPTII,S$GLB,, | Performed by: NURSE PRACTITIONER

## 2021-08-18 PROCEDURE — 3077F PR MOST RECENT SYSTOLIC BLOOD PRESSURE >= 140 MM HG: ICD-10-PCS | Mod: CPTII,S$GLB,, | Performed by: NURSE PRACTITIONER

## 2021-08-18 PROCEDURE — 3044F HG A1C LEVEL LT 7.0%: CPT | Mod: CPTII,S$GLB,, | Performed by: NURSE PRACTITIONER

## 2021-08-18 PROCEDURE — 76700 US ABDOMEN COMPLETE: ICD-10-PCS | Mod: 26,,, | Performed by: RADIOLOGY

## 2021-08-18 PROCEDURE — 99214 OFFICE O/P EST MOD 30 MIN: CPT | Mod: S$GLB,,, | Performed by: NURSE PRACTITIONER

## 2021-08-18 PROCEDURE — 1126F PR PAIN SEVERITY QUANTIFIED, NO PAIN PRESENT: ICD-10-PCS | Mod: CPTII,S$GLB,, | Performed by: NURSE PRACTITIONER

## 2021-08-18 PROCEDURE — 99999 PR PBB SHADOW E&M-EST. PATIENT-LVL III: CPT | Mod: PBBFAC,,, | Performed by: NURSE PRACTITIONER

## 2021-08-18 PROCEDURE — 1160F RVW MEDS BY RX/DR IN RCRD: CPT | Mod: CPTII,S$GLB,, | Performed by: NURSE PRACTITIONER

## 2021-08-18 PROCEDURE — 76700 US EXAM ABDOM COMPLETE: CPT | Mod: TC

## 2021-08-18 PROCEDURE — 1159F MED LIST DOCD IN RCRD: CPT | Mod: CPTII,S$GLB,, | Performed by: NURSE PRACTITIONER

## 2021-08-20 ENCOUNTER — TELEPHONE (OUTPATIENT)
Dept: HEPATOLOGY | Facility: CLINIC | Age: 46
End: 2021-08-20

## 2021-08-20 DIAGNOSIS — R16.0 LIVER MASS: Primary | ICD-10-CM

## 2021-10-04 ENCOUNTER — PATIENT MESSAGE (OUTPATIENT)
Dept: ADMINISTRATIVE | Facility: HOSPITAL | Age: 46
End: 2021-10-04

## 2021-10-13 DIAGNOSIS — E11.9 TYPE 2 DIABETES MELLITUS WITHOUT COMPLICATION: ICD-10-CM

## 2021-10-18 ENCOUNTER — PATIENT MESSAGE (OUTPATIENT)
Dept: ADMINISTRATIVE | Facility: HOSPITAL | Age: 46
End: 2021-10-18
Payer: COMMERCIAL

## 2021-10-25 DIAGNOSIS — I10 HYPERTENSION, WELL CONTROLLED: ICD-10-CM

## 2021-10-27 ENCOUNTER — PATIENT MESSAGE (OUTPATIENT)
Dept: ADMINISTRATIVE | Facility: HOSPITAL | Age: 46
End: 2021-10-27
Payer: COMMERCIAL

## 2021-10-27 ENCOUNTER — PATIENT OUTREACH (OUTPATIENT)
Dept: ADMINISTRATIVE | Facility: HOSPITAL | Age: 46
End: 2021-10-27
Payer: COMMERCIAL

## 2021-10-28 RX ORDER — AMLODIPINE AND BENAZEPRIL HYDROCHLORIDE 10; 40 MG/1; MG/1
CAPSULE ORAL
Qty: 90 CAPSULE | Refills: 0 | Status: SHIPPED | OUTPATIENT
Start: 2021-10-28 | End: 2022-01-27

## 2021-11-05 ENCOUNTER — PATIENT OUTREACH (OUTPATIENT)
Dept: ADMINISTRATIVE | Facility: HOSPITAL | Age: 46
End: 2021-11-05
Payer: COMMERCIAL

## 2021-11-08 ENCOUNTER — TELEPHONE (OUTPATIENT)
Dept: PRIMARY CARE CLINIC | Facility: CLINIC | Age: 46
End: 2021-11-08
Payer: COMMERCIAL

## 2021-12-16 ENCOUNTER — TELEPHONE (OUTPATIENT)
Dept: INTERNAL MEDICINE | Facility: CLINIC | Age: 46
End: 2021-12-16
Payer: COMMERCIAL

## 2022-01-19 ENCOUNTER — PATIENT MESSAGE (OUTPATIENT)
Dept: ADMINISTRATIVE | Facility: HOSPITAL | Age: 47
End: 2022-01-19
Payer: COMMERCIAL

## 2022-01-20 DIAGNOSIS — E11.9 TYPE 2 DIABETES MELLITUS WITHOUT COMPLICATION, WITHOUT LONG-TERM CURRENT USE OF INSULIN: ICD-10-CM

## 2022-01-20 RX ORDER — METFORMIN HYDROCHLORIDE 1000 MG/1
TABLET ORAL
Qty: 180 TABLET | Refills: 1 | Status: SHIPPED | OUTPATIENT
Start: 2022-01-20 | End: 2022-03-14 | Stop reason: SDUPTHER

## 2022-01-20 NOTE — TELEPHONE ENCOUNTER
Care Due:                  Date            Visit Type   Department     Provider  --------------------------------------------------------------------------------                                             [facility] INTERNAL  Last Visit: 10-      [facility]       [facility] BETTS                                           [facility] INTERNAL  Next Visit: 03-      None         [facility]                                                            Last  Test          Frequency    Reason                     Performed    Due Date  --------------------------------------------------------------------------------    CMP.........  12 months..  FARXIGA,                   Not Found    Overdue                             amLODIPine-benazepriL,                             metFORMIN................    HBA1C.......  6 months...  KANIKA CRABTREE,        01- 07-                             metFORMIN................    Powered by marshallindex by [employer]. Reference number: [id number].   1/20/2022 3:42:35 AM CST

## 2022-01-26 DIAGNOSIS — I10 HYPERTENSION, WELL CONTROLLED: ICD-10-CM

## 2022-01-26 NOTE — TELEPHONE ENCOUNTER
No new care gaps identified.  Powered by [employer]. Reference number: [id number].   1/26/2022 3:32:07 AM CST

## 2022-01-27 RX ORDER — AMLODIPINE AND BENAZEPRIL HYDROCHLORIDE 10; 40 MG/1; MG/1
CAPSULE ORAL
Qty: 90 CAPSULE | Refills: 0 | Status: SHIPPED | OUTPATIENT
Start: 2022-01-27 | End: 2022-03-14 | Stop reason: SDUPTHER

## 2022-02-12 ENCOUNTER — HOSPITAL ENCOUNTER (OUTPATIENT)
Dept: RADIOLOGY | Facility: HOSPITAL | Age: 47
Discharge: HOME | End: 2022-02-12
Attending: NURSE PRACTITIONER
Payer: COMMERCIAL

## 2022-02-12 DIAGNOSIS — R16.0 LIVER MASS: ICD-10-CM

## 2022-02-12 PROCEDURE — 76705 ECHO EXAM OF ABDOMEN: CPT | Mod: TC

## 2022-02-12 PROCEDURE — 76705 ECHO EXAM OF ABDOMEN: CPT | Mod: 26,,, | Performed by: RADIOLOGY

## 2022-02-12 PROCEDURE — 76705 US ABDOMEN LIMITED: ICD-10-PCS | Mod: 26,,, | Performed by: RADIOLOGY

## 2022-02-14 ENCOUNTER — TELEPHONE (OUTPATIENT)
Dept: HEPATOLOGY | Facility: CLINIC | Age: 47
End: 2022-02-14
Payer: COMMERCIAL

## 2022-02-14 NOTE — TELEPHONE ENCOUNTER
----- Message from Libby Verdugo PA-C sent at 2/14/2022  1:02 PM CST -----  Please call patient and let him know ultrasound of the liver did not show any changes to the liver lesion we are following. No new management, keep appt with Carmen in a few months.

## 2022-03-14 ENCOUNTER — LAB VISIT (OUTPATIENT)
Dept: LAB | Facility: HOSPITAL | Age: 47
End: 2022-03-14
Attending: INTERNAL MEDICINE
Payer: COMMERCIAL

## 2022-03-14 ENCOUNTER — OFFICE VISIT (OUTPATIENT)
Dept: INTERNAL MEDICINE | Facility: CLINIC | Age: 47
End: 2022-03-14
Payer: COMMERCIAL

## 2022-03-14 ENCOUNTER — PATIENT MESSAGE (OUTPATIENT)
Dept: ADMINISTRATIVE | Facility: HOSPITAL | Age: 47
End: 2022-03-14
Payer: COMMERCIAL

## 2022-03-14 VITALS
DIASTOLIC BLOOD PRESSURE: 100 MMHG | WEIGHT: 183.19 LBS | HEIGHT: 67 IN | HEART RATE: 104 BPM | OXYGEN SATURATION: 95 % | BODY MASS INDEX: 28.75 KG/M2 | SYSTOLIC BLOOD PRESSURE: 148 MMHG

## 2022-03-14 DIAGNOSIS — I10 HYPERTENSION, WELL CONTROLLED: ICD-10-CM

## 2022-03-14 DIAGNOSIS — E11.29 TYPE 2 DIABETES MELLITUS WITH MICROALBUMINURIA, WITHOUT LONG-TERM CURRENT USE OF INSULIN: ICD-10-CM

## 2022-03-14 DIAGNOSIS — Z00.00 ANNUAL PHYSICAL EXAM: Primary | ICD-10-CM

## 2022-03-14 DIAGNOSIS — Z00.00 ANNUAL PHYSICAL EXAM: ICD-10-CM

## 2022-03-14 DIAGNOSIS — R80.9 TYPE 2 DIABETES MELLITUS WITH MICROALBUMINURIA, WITHOUT LONG-TERM CURRENT USE OF INSULIN: ICD-10-CM

## 2022-03-14 DIAGNOSIS — Z12.11 COLON CANCER SCREENING: ICD-10-CM

## 2022-03-14 LAB
ALBUMIN SERPL BCP-MCNC: 4.1 G/DL (ref 3.5–5.2)
ALBUMIN/CREAT UR: 481 UG/MG (ref 0–30)
ALP SERPL-CCNC: 64 U/L (ref 55–135)
ALT SERPL W/O P-5'-P-CCNC: 57 U/L (ref 10–44)
ANION GAP SERPL CALC-SCNC: 14 MMOL/L (ref 8–16)
AST SERPL-CCNC: 38 U/L (ref 10–40)
BASOPHILS # BLD AUTO: 0.14 K/UL (ref 0–0.2)
BASOPHILS NFR BLD: 1.3 % (ref 0–1.9)
BILIRUB SERPL-MCNC: 0.4 MG/DL (ref 0.1–1)
BUN SERPL-MCNC: 19 MG/DL (ref 6–20)
CALCIUM SERPL-MCNC: 9.9 MG/DL (ref 8.7–10.5)
CHLORIDE SERPL-SCNC: 102 MMOL/L (ref 95–110)
CHOLEST SERPL-MCNC: 216 MG/DL (ref 120–199)
CHOLEST/HDLC SERPL: 6.2 {RATIO} (ref 2–5)
CO2 SERPL-SCNC: 26 MMOL/L (ref 23–29)
CREAT SERPL-MCNC: 1 MG/DL (ref 0.5–1.4)
CREAT UR-MCNC: 63 MG/DL (ref 23–375)
DIFFERENTIAL METHOD: ABNORMAL
EOSINOPHIL # BLD AUTO: 0.3 K/UL (ref 0–0.5)
EOSINOPHIL NFR BLD: 2.4 % (ref 0–8)
ERYTHROCYTE [DISTWIDTH] IN BLOOD BY AUTOMATED COUNT: 13.7 % (ref 11.5–14.5)
EST. GFR  (AFRICAN AMERICAN): >60 ML/MIN/1.73 M^2
EST. GFR  (NON AFRICAN AMERICAN): >60 ML/MIN/1.73 M^2
ESTIMATED AVG GLUCOSE: 126 MG/DL (ref 68–131)
GLUCOSE SERPL-MCNC: 125 MG/DL (ref 70–110)
HBA1C MFR BLD: 6 % (ref 4–5.6)
HCT VFR BLD AUTO: 51.7 % (ref 40–54)
HDLC SERPL-MCNC: 35 MG/DL (ref 40–75)
HDLC SERPL: 16.2 % (ref 20–50)
HGB BLD-MCNC: 16.8 G/DL (ref 14–18)
IMM GRANULOCYTES # BLD AUTO: 0.07 K/UL (ref 0–0.04)
IMM GRANULOCYTES NFR BLD AUTO: 0.6 % (ref 0–0.5)
LDLC SERPL CALC-MCNC: ABNORMAL MG/DL (ref 63–159)
LYMPHOCYTES # BLD AUTO: 3.1 K/UL (ref 1–4.8)
LYMPHOCYTES NFR BLD: 28.2 % (ref 18–48)
MCH RBC QN AUTO: 30.2 PG (ref 27–31)
MCHC RBC AUTO-ENTMCNC: 32.5 G/DL (ref 32–36)
MCV RBC AUTO: 93 FL (ref 82–98)
MICROALBUMIN UR DL<=1MG/L-MCNC: 303 UG/ML
MONOCYTES # BLD AUTO: 0.7 K/UL (ref 0.3–1)
MONOCYTES NFR BLD: 6.5 % (ref 4–15)
NEUTROPHILS # BLD AUTO: 6.7 K/UL (ref 1.8–7.7)
NEUTROPHILS NFR BLD: 61 % (ref 38–73)
NONHDLC SERPL-MCNC: 181 MG/DL
NRBC BLD-RTO: 0 /100 WBC
PLATELET # BLD AUTO: 301 K/UL (ref 150–450)
PMV BLD AUTO: 12.5 FL (ref 9.2–12.9)
POTASSIUM SERPL-SCNC: 4 MMOL/L (ref 3.5–5.1)
PROT SERPL-MCNC: 7.9 G/DL (ref 6–8.4)
RBC # BLD AUTO: 5.56 M/UL (ref 4.6–6.2)
SODIUM SERPL-SCNC: 142 MMOL/L (ref 136–145)
TRIGL SERPL-MCNC: 782 MG/DL (ref 30–150)
WBC # BLD AUTO: 10.94 K/UL (ref 3.9–12.7)

## 2022-03-14 PROCEDURE — 3080F DIAST BP >= 90 MM HG: CPT | Mod: CPTII,S$GLB,, | Performed by: INTERNAL MEDICINE

## 2022-03-14 PROCEDURE — 3077F SYST BP >= 140 MM HG: CPT | Mod: CPTII,S$GLB,, | Performed by: INTERNAL MEDICINE

## 2022-03-14 PROCEDURE — 99999 PR PBB SHADOW E&M-EST. PATIENT-LVL IV: CPT | Mod: PBBFAC,,, | Performed by: INTERNAL MEDICINE

## 2022-03-14 PROCEDURE — 99396 PREV VISIT EST AGE 40-64: CPT | Mod: S$GLB,,, | Performed by: INTERNAL MEDICINE

## 2022-03-14 PROCEDURE — 1159F MED LIST DOCD IN RCRD: CPT | Mod: CPTII,S$GLB,, | Performed by: INTERNAL MEDICINE

## 2022-03-14 PROCEDURE — 82043 UR ALBUMIN QUANTITATIVE: CPT | Performed by: INTERNAL MEDICINE

## 2022-03-14 PROCEDURE — 80053 COMPREHEN METABOLIC PANEL: CPT | Performed by: INTERNAL MEDICINE

## 2022-03-14 PROCEDURE — 83036 HEMOGLOBIN GLYCOSYLATED A1C: CPT | Performed by: INTERNAL MEDICINE

## 2022-03-14 PROCEDURE — 4010F PR ACE/ARB THEARPY RXD/TAKEN: ICD-10-PCS | Mod: CPTII,S$GLB,, | Performed by: INTERNAL MEDICINE

## 2022-03-14 PROCEDURE — 1160F RVW MEDS BY RX/DR IN RCRD: CPT | Mod: CPTII,S$GLB,, | Performed by: INTERNAL MEDICINE

## 2022-03-14 PROCEDURE — 3077F PR MOST RECENT SYSTOLIC BLOOD PRESSURE >= 140 MM HG: ICD-10-PCS | Mod: CPTII,S$GLB,, | Performed by: INTERNAL MEDICINE

## 2022-03-14 PROCEDURE — 85025 COMPLETE CBC W/AUTO DIFF WBC: CPT | Performed by: INTERNAL MEDICINE

## 2022-03-14 PROCEDURE — 3008F PR BODY MASS INDEX (BMI) DOCUMENTED: ICD-10-PCS | Mod: CPTII,S$GLB,, | Performed by: INTERNAL MEDICINE

## 2022-03-14 PROCEDURE — 99999 PR PBB SHADOW E&M-EST. PATIENT-LVL IV: ICD-10-PCS | Mod: PBBFAC,,, | Performed by: INTERNAL MEDICINE

## 2022-03-14 PROCEDURE — 82570 ASSAY OF URINE CREATININE: CPT | Performed by: INTERNAL MEDICINE

## 2022-03-14 PROCEDURE — 4010F ACE/ARB THERAPY RXD/TAKEN: CPT | Mod: CPTII,S$GLB,, | Performed by: INTERNAL MEDICINE

## 2022-03-14 PROCEDURE — 3080F PR MOST RECENT DIASTOLIC BLOOD PRESSURE >= 90 MM HG: ICD-10-PCS | Mod: CPTII,S$GLB,, | Performed by: INTERNAL MEDICINE

## 2022-03-14 PROCEDURE — 36415 COLL VENOUS BLD VENIPUNCTURE: CPT | Performed by: INTERNAL MEDICINE

## 2022-03-14 PROCEDURE — 1159F PR MEDICATION LIST DOCUMENTED IN MEDICAL RECORD: ICD-10-PCS | Mod: CPTII,S$GLB,, | Performed by: INTERNAL MEDICINE

## 2022-03-14 PROCEDURE — 1160F PR REVIEW ALL MEDS BY PRESCRIBER/CLIN PHARMACIST DOCUMENTED: ICD-10-PCS | Mod: CPTII,S$GLB,, | Performed by: INTERNAL MEDICINE

## 2022-03-14 PROCEDURE — 80061 LIPID PANEL: CPT | Performed by: INTERNAL MEDICINE

## 2022-03-14 PROCEDURE — 3008F BODY MASS INDEX DOCD: CPT | Mod: CPTII,S$GLB,, | Performed by: INTERNAL MEDICINE

## 2022-03-14 PROCEDURE — 99396 PR PREVENTIVE VISIT,EST,40-64: ICD-10-PCS | Mod: S$GLB,,, | Performed by: INTERNAL MEDICINE

## 2022-03-14 RX ORDER — METFORMIN HYDROCHLORIDE 1000 MG/1
1000 TABLET ORAL 2 TIMES DAILY WITH MEALS
Qty: 180 TABLET | Refills: 3 | Status: SHIPPED | OUTPATIENT
Start: 2022-03-14 | End: 2022-08-11

## 2022-03-14 RX ORDER — ATORVASTATIN CALCIUM 20 MG/1
20 TABLET, FILM COATED ORAL DAILY
Qty: 90 TABLET | Refills: 3 | Status: SHIPPED | OUTPATIENT
Start: 2022-03-14 | End: 2023-05-24 | Stop reason: SDUPTHER

## 2022-03-14 RX ORDER — DAPAGLIFLOZIN 5 MG/1
5 TABLET, FILM COATED ORAL DAILY
Qty: 90 TABLET | Refills: 3 | Status: SHIPPED | OUTPATIENT
Start: 2022-03-14 | End: 2022-03-21

## 2022-03-14 RX ORDER — AMLODIPINE AND BENAZEPRIL HYDROCHLORIDE 10; 40 MG/1; MG/1
1 CAPSULE ORAL DAILY
Qty: 90 CAPSULE | Refills: 3 | Status: SHIPPED | OUTPATIENT
Start: 2022-03-14 | End: 2023-05-24 | Stop reason: SDUPTHER

## 2022-03-14 RX ORDER — DULAGLUTIDE 0.75 MG/.5ML
0.75 INJECTION, SOLUTION SUBCUTANEOUS WEEKLY
Qty: 12 PEN | Refills: 3 | Status: SHIPPED | OUTPATIENT
Start: 2022-03-14 | End: 2023-08-21

## 2022-03-14 NOTE — PROGRESS NOTES
"    CHIEF COMPLAINT     No chief complaint on file.    Annual exam  HPI     Mark Rivera type 2 diabetes hypertension is a 46 y.o. male here today for   It has been several months since last seen.  Reports he ran out a couple of his medications.  Has gained approximately 10 lb since last visit        Personally Reviewed Patient's Medical, surgical, family and social hx. Changes updated in [facility].  Care Team updated in Epic    Review of Systems:  Review of Systems   Constitutional: Negative for fatigue and fever.   Respiratory: Negative for cough and shortness of breath.    Cardiovascular: Negative for leg swelling.   Gastrointestinal: Negative for constipation and diarrhea.       Health Maintenance:   Reviewed with patient  Due for the following:      PHYSICAL EXAM     BP (!) 148/100   Pulse 104   Ht 5' 7" (1.702 m)   Wt 83.1 kg (183 lb 3.2 oz)   SpO2 95%   BMI 28.69 kg/m²     Gen: Well Appearing, NAD  HEENT: PERR, EOMI  Neck: FROM, no thyromegaly, no cervical adenopathy  CVD: RRR, no M/R/G  Pulm: Normal work of breathing, CTAB, no wheezing  Abd:  Soft, NT, ND non TTP, no mass  MSK: no LE edema  Neuro: A&Ox3, gait normal, speech normal  Mood; Mood normal, behavior normal, thought process linear       LABS     Labs reviewed; order  ASSESSMENT     1. Annual physical exam  CBC Auto Differential    Comprehensive Metabolic Panel    Hemoglobin A1C    Lipid Panel   2. Type 2 diabetes mellitus with microalbuminuria, without long-term current use of insulin  dapagliflozin (FARXIGA) 5 mg Tab tablet    dulaglutide (TRULICITY) 0.75 mg/0.5 mL pen injector    atorvastatin (LIPITOR) 20 MG tablet    metFORMIN (GLUCOPHAGE) 1000 MG tablet    MICROALBUMIN / CREATININE RATIO URINE    Ambulatory referral/consult to Optometry   3. Hypertension, well controlled  amLODIPine-benazepriL (LOTREL) 10-40 mg per capsule   4. Colon cancer screening  Case Request Endoscopy: COLONOSCOPY           Plan     Mark Rivera is a 46 y.o. male with type 2 " diabetes with microalbuminuria, hypertension today for annual exam  1. Annual physical exam  Updated problem list, medical history, care team and discussed HM.     - CBC Auto Differential; Future  - Comprehensive Metabolic Panel; Future  - Hemoglobin A1C; Future  - Lipid Panel; Future    2. Type 2 diabetes mellitus with microalbuminuria, without long-term current use of insulin  Will recheck A1c in restart Farxiga and Trulicity  Continue metformin  - dapagliflozin (FARXIGA) 5 mg Tab tablet; Take 1 tablet (5 mg total) by mouth once daily.  Dispense: 90 tablet; Refill: 3  - dulaglutide (TRULICITY) 0.75 mg/0.5 mL pen injector; Inject 0.75 mg into the skin once a week.  Dispense: 12 pen; Refill: 3  - atorvastatin (LIPITOR) 20 MG tablet; Take 1 tablet (20 mg total) by mouth once daily.  Dispense: 90 tablet; Refill: 3  - metFORMIN (GLUCOPHAGE) 1000 MG tablet; Take 1 tablet (1,000 mg total) by mouth 2 (two) times daily with meals.  Dispense: 180 tablet; Refill: 3  - MICROALBUMIN / CREATININE RATIO URINE  - Ambulatory referral/consult to Optometry; Future    3. Hypertension, well controlled  Elevated today but low on medication will send a new prescription will follow-up at next visit.  - amLODIPine-benazepriL (LOTREL) 10-40 mg per capsule; Take 1 capsule by mouth once daily.  Dispense: 90 capsule; Refill: 3    4. Colon cancer screening  - Case Request Endoscopy: COLONOSCOPY      Yonas Najera MD

## 2022-08-09 ENCOUNTER — LAB VISIT (OUTPATIENT)
Dept: LAB | Facility: HOSPITAL | Age: 47
End: 2022-08-09
Payer: COMMERCIAL

## 2022-08-09 DIAGNOSIS — K76.0 NAFLD (NONALCOHOLIC FATTY LIVER DISEASE): ICD-10-CM

## 2022-08-09 LAB
ALBUMIN SERPL BCP-MCNC: 3.7 G/DL (ref 3.5–5.2)
ALP SERPL-CCNC: 56 U/L (ref 55–135)
ALT SERPL W/O P-5'-P-CCNC: 49 U/L (ref 10–44)
AST SERPL-CCNC: 29 U/L (ref 10–40)
BILIRUB DIRECT SERPL-MCNC: 0.2 MG/DL (ref 0.1–0.3)
BILIRUB SERPL-MCNC: 0.6 MG/DL (ref 0.1–1)
PROT SERPL-MCNC: 6.6 G/DL (ref 6–8.4)

## 2022-08-09 PROCEDURE — 80076 HEPATIC FUNCTION PANEL: CPT | Performed by: NURSE PRACTITIONER

## 2022-08-09 PROCEDURE — 36415 COLL VENOUS BLD VENIPUNCTURE: CPT | Performed by: NURSE PRACTITIONER

## 2022-08-11 ENCOUNTER — OFFICE VISIT (OUTPATIENT)
Dept: HEPATOLOGY | Facility: CLINIC | Age: 47
End: 2022-08-11
Payer: COMMERCIAL

## 2022-08-11 ENCOUNTER — PROCEDURE VISIT (OUTPATIENT)
Dept: HEPATOLOGY | Facility: CLINIC | Age: 47
End: 2022-08-11
Payer: COMMERCIAL

## 2022-08-11 VITALS
HEART RATE: 75 BPM | BODY MASS INDEX: 28.72 KG/M2 | OXYGEN SATURATION: 95 % | RESPIRATION RATE: 18 BRPM | DIASTOLIC BLOOD PRESSURE: 108 MMHG | WEIGHT: 183 LBS | HEIGHT: 67 IN | TEMPERATURE: 98 F | SYSTOLIC BLOOD PRESSURE: 164 MMHG

## 2022-08-11 DIAGNOSIS — R74.8 ELEVATED LIVER ENZYMES: ICD-10-CM

## 2022-08-11 DIAGNOSIS — R16.0 LIVER MASS: ICD-10-CM

## 2022-08-11 DIAGNOSIS — K76.0 NAFLD (NONALCOHOLIC FATTY LIVER DISEASE): Primary | ICD-10-CM

## 2022-08-11 DIAGNOSIS — I10 HYPERTENSION, WELL CONTROLLED: ICD-10-CM

## 2022-08-11 DIAGNOSIS — R80.9 TYPE 2 DIABETES MELLITUS WITH MICROALBUMINURIA, WITHOUT LONG-TERM CURRENT USE OF INSULIN: ICD-10-CM

## 2022-08-11 DIAGNOSIS — K76.0 NAFLD (NONALCOHOLIC FATTY LIVER DISEASE): ICD-10-CM

## 2022-08-11 DIAGNOSIS — E11.29 TYPE 2 DIABETES MELLITUS WITH MICROALBUMINURIA, WITHOUT LONG-TERM CURRENT USE OF INSULIN: ICD-10-CM

## 2022-08-11 DIAGNOSIS — E66.3 OVERWEIGHT (BMI 25.0-29.9): ICD-10-CM

## 2022-08-11 DIAGNOSIS — M25.531 RIGHT WRIST PAIN: ICD-10-CM

## 2022-08-11 DIAGNOSIS — E78.1 HYPERTRIGLYCERIDEMIA: ICD-10-CM

## 2022-08-11 DIAGNOSIS — R25.1 TREMOR OF RIGHT HAND: ICD-10-CM

## 2022-08-11 PROCEDURE — 4010F PR ACE/ARB THEARPY RXD/TAKEN: ICD-10-PCS | Mod: CPTII,S$GLB,, | Performed by: NURSE PRACTITIONER

## 2022-08-11 PROCEDURE — 4010F ACE/ARB THERAPY RXD/TAKEN: CPT | Mod: CPTII,S$GLB,, | Performed by: NURSE PRACTITIONER

## 2022-08-11 PROCEDURE — 91200 LIVER ELASTOGRAPHY: CPT | Mod: S$GLB,,, | Performed by: NURSE PRACTITIONER

## 2022-08-11 PROCEDURE — 3077F SYST BP >= 140 MM HG: CPT | Mod: CPTII,S$GLB,, | Performed by: NURSE PRACTITIONER

## 2022-08-11 PROCEDURE — 3077F PR MOST RECENT SYSTOLIC BLOOD PRESSURE >= 140 MM HG: ICD-10-PCS | Mod: CPTII,S$GLB,, | Performed by: NURSE PRACTITIONER

## 2022-08-11 PROCEDURE — 99999 PR PBB SHADOW E&M-EST. PATIENT-LVL V: ICD-10-PCS | Mod: PBBFAC,,, | Performed by: NURSE PRACTITIONER

## 2022-08-11 PROCEDURE — 91200 FIBROSCAN (VIBRATION CONTROLLED TRANSIENT ELASTOGRAPHY): ICD-10-PCS | Mod: S$GLB,,, | Performed by: NURSE PRACTITIONER

## 2022-08-11 PROCEDURE — 3008F PR BODY MASS INDEX (BMI) DOCUMENTED: ICD-10-PCS | Mod: CPTII,S$GLB,, | Performed by: NURSE PRACTITIONER

## 2022-08-11 PROCEDURE — 1159F MED LIST DOCD IN RCRD: CPT | Mod: CPTII,S$GLB,, | Performed by: NURSE PRACTITIONER

## 2022-08-11 PROCEDURE — 99214 OFFICE O/P EST MOD 30 MIN: CPT | Mod: S$GLB,,, | Performed by: NURSE PRACTITIONER

## 2022-08-11 PROCEDURE — 3044F PR MOST RECENT HEMOGLOBIN A1C LEVEL <7.0%: ICD-10-PCS | Mod: CPTII,S$GLB,, | Performed by: NURSE PRACTITIONER

## 2022-08-11 PROCEDURE — 3062F PR POS MACROALBUMINURIA RESULT DOCUMENTED/REVIEW: ICD-10-PCS | Mod: CPTII,S$GLB,, | Performed by: NURSE PRACTITIONER

## 2022-08-11 PROCEDURE — 99214 PR OFFICE/OUTPT VISIT, EST, LEVL IV, 30-39 MIN: ICD-10-PCS | Mod: S$GLB,,, | Performed by: NURSE PRACTITIONER

## 2022-08-11 PROCEDURE — 3044F HG A1C LEVEL LT 7.0%: CPT | Mod: CPTII,S$GLB,, | Performed by: NURSE PRACTITIONER

## 2022-08-11 PROCEDURE — 3080F PR MOST RECENT DIASTOLIC BLOOD PRESSURE >= 90 MM HG: ICD-10-PCS | Mod: CPTII,S$GLB,, | Performed by: NURSE PRACTITIONER

## 2022-08-11 PROCEDURE — 3066F NEPHROPATHY DOC TX: CPT | Mod: CPTII,S$GLB,, | Performed by: NURSE PRACTITIONER

## 2022-08-11 PROCEDURE — 3066F PR DOCUMENTATION OF TREATMENT FOR NEPHROPATHY: ICD-10-PCS | Mod: CPTII,S$GLB,, | Performed by: NURSE PRACTITIONER

## 2022-08-11 PROCEDURE — 3008F BODY MASS INDEX DOCD: CPT | Mod: CPTII,S$GLB,, | Performed by: NURSE PRACTITIONER

## 2022-08-11 PROCEDURE — 3062F POS MACROALBUMINURIA REV: CPT | Mod: CPTII,S$GLB,, | Performed by: NURSE PRACTITIONER

## 2022-08-11 PROCEDURE — 99999 PR PBB SHADOW E&M-EST. PATIENT-LVL V: CPT | Mod: PBBFAC,,, | Performed by: NURSE PRACTITIONER

## 2022-08-11 PROCEDURE — 3080F DIAST BP >= 90 MM HG: CPT | Mod: CPTII,S$GLB,, | Performed by: NURSE PRACTITIONER

## 2022-08-11 PROCEDURE — 1159F PR MEDICATION LIST DOCUMENTED IN MEDICAL RECORD: ICD-10-PCS | Mod: CPTII,S$GLB,, | Performed by: NURSE PRACTITIONER

## 2022-08-11 NOTE — PROGRESS NOTES
[facility] - Established Patient    Last Clinic Visit: 7/30/20    Chief Complaint: Follow-up for fatty liver, liver mass         HISTORY     This is a 46 y.o. male with PMH noted below, here for follow-up of fatty liver and liver mass.     Fatty liver first noted on abd US in 2019.    His transaminases have been elevated since 7/2018, ALT>AST (max 125). Serologic workup has been negative for other etiologies of liver disease.     Fibrosis staging:   Fibroscan 7/2019 = F0-F1, S3  Fibroscan 7/2020 = F0-F1, S2  Fibroscan today = F0-F1 (kPa 5.1), S3 ()    We have also been following an indeterminate liver mass, first noted on imaging since 3/2019.      Follow-up imaging:  -TPCT 3/2019 - 1.8 cm nodular in R lobe, indeterminate  -MRI at outside facility ([facility]) - 1.8 cm lesion in R lobe, ? cavernous hemangioma though still indeterminate  -MRI 1/2020 - nonspecific 2.5 cm lesion in R hepatic lobe. No concerning features to suggest HCC.   *AFP normal    Previously reviewed at IR conference-- stable 2.5 cm lesion, recommendation for surveillance with US.    Abd US 2/2022- stable 2.2 cm liver lesion    Interval history:  His liver enzymes normalized 9/2020 when HgbA1c improved from 13.9 -- 5.5. Since our last visit he has gained weight and cholesterol has increased. ALT mildly elevated again, 40-50s.       Updates on risk factors for fatty liver:  · Weight -- Body mass index is 28.66 kg/m².  · Weight is up ~8 lb since last year    · Has cut back on rice   · Dyslipidemia -- TG now >700, on statin                                 · Insulin resistance / diabetes -- last HgbA1c 6.0         · Hypertension -- high at last 3 clinic visits, on meds   · Alcohol use -- very minimal (h/o heavy alcohol use)    Feels well overall.  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             Past medical history, surgical history, problem list,  "family history, social history, allergies: Reviewed and updated in the appropriate section of the electronic medical record.      Current Outpatient Medications   Medication Sig Dispense Refill    amLODIPine-benazepriL (LOTREL) 10-40 mg per capsule Take 1 capsule by mouth once daily. 90 capsule 3    atorvastatin (LIPITOR) 20 MG tablet Take 1 tablet (20 mg total) by mouth once daily. 90 tablet 3    BD ULTRA-FINE ORIG PEN NEEDLE 29 gauge x 1/2" Ndle       dulaglutide (TRULICITY) 0.75 mg/0.5 mL pen injector Inject 0.75 mg into the skin once a week. 12 pen 3    FARXIGA 5 mg Tab tablet TAKE 1 TABLET(5 MG) BY MOUTH EVERY DAY 90 tablet 3    metFORMIN (GLUCOPHAGE) 1000 MG tablet Take 1 tablet (1,000 mg total) by mouth 2 (two) times daily with meals. 180 tablet 3    MICROLET LANCET Misc       TRUE METRIX GLUCOSE METER Misc USE AS DIRECTED      TRUE METRIX GLUCOSE TEST STRIP Strp        No current facility-administered medications for this visit.     Medication list reviewed and updated.      Review of Systems   Constitutional: Negative for fatigue or unexpected weight change.   Respiratory: Negative for shortness of breath.    Cardiovascular: Negative for leg swelling.  Gastrointestinal: Negative for abdominal distention, abdominal pain, diarrhea, constipation, nausea, and vomiting. Negative for blood in stool, melena, or hematemesis.  Musculoskeletal: Negative for myalgias. +right hand pain and tremor  Skin: Negative for itching.  Neurological: Negative for dizziness. Negative for confusion, slowed mentation, or memory loss.   Hematological: Does not bruise/bleed easily.   Psychiatric: Negative for mood changes or sleep disturbance.      Physical Exam   Constitutional: Well-nourished. No distress. Alert and oriented to person, place, and time.  Eyes: No scleral icterus.   Pulmonary/Chest: Respiratory effort normal, no respiratory distress.   Abdominal: No distension, no ascites appreciated.   Extremities: No edema. " "  Neurological: No tremor. Gait normal. No asterixis.    Skin: No jaundice. No spider angiomas or palmar erythema.  Psychiatric: Normal mood and affect. Speech, behavior, and thought content normal. No depression or anxiety noted.     Vitals reviewed.  BP (!) 164/108 (BP Location: Right arm, Patient Position: Sitting, BP Method: Medium (Automatic))   Pulse 75   Temp 97.9 °F (36.6 °C) (Oral)   Resp 18   Ht 5' 7" (1.702 m)   Wt 83 kg (182 lb 15.7 oz)   SpO2 95%   BMI 28.66 kg/m²       LABS & DIAGNOSTIC STUDIES     I have personally reviewed pertinent laboratory findings:    Lab Results   Component Value Date    ALT 49 (H) 08/09/2022    AST 29 08/09/2022    ALKPHOS 56 08/09/2022    BILITOT 0.6 08/09/2022    ALBUMIN 3.7 08/09/2022    INR 0.9 04/19/2018       Lab Results   Component Value Date    WBC 10.94 03/14/2022    HGB 16.8 03/14/2022    HCT 51.7 03/14/2022    MCV 93 03/14/2022     03/14/2022       Lab Results   Component Value Date     03/14/2022    K 4.0 03/14/2022    BUN 19 03/14/2022    CREATININE 1.0 03/14/2022    ESTGFRAFRICA >60.0 03/14/2022    EGFRNONAA >60.0 03/14/2022       Lab Results   Component Value Date    SMOOTHMUSCAB Negative 1:40 06/12/2019    ANASCREEN Negative <1:160 06/12/2019    FERRITIN 434 (H) 05/22/2019    FESATURATED 23 05/22/2019    LZLOJ1HYSSSY MM 07/03/2019    JMMOX0IUQRSM 104 07/03/2019    CERULOPLSM 28.0 07/03/2019    HEPBSAG Negative 03/20/2019    HEPBIGM Negative 03/20/2019    HEPBCAB Negative 07/03/2019    HEPCAB Negative 03/20/2019    HEPAIGM Negative 03/20/2019    LXA17CRXR Negative 06/12/2019       Lab Results   Component Value Date    AFP 3.6 07/03/2019       I have personally reviewed the following result reports:  Abdominal US - 2/12/22  CT - 3/27/19  MRI - 1/9/20        ASSESSMENT & PLAN     46 y.o. male with:    1. NAFLD  -- Labs, imaging, and prior fibrosis staging reviewed.   -- Discussed risk of hepatic inflammation and subsequent fibrosis from fatty " liver. ALT has increased with weight gain and higher cholesterol. Recommend lab monitoring 1-2 times per year, can be done with PCP.   -- Fibroscan today still shows significant steatosis but no-minimal fibrosis (F0-F1); repeat every 2 years   -- Reminded that the only treatment for fatty liver is weight loss and maintaining good control of metabolic risk factors (blood pressure, cholesterol, and blood sugar).   -- Consider GRIMES clinical trials if testing ever suggests fibrosis     2. Body mass index is 28.66 kg/m²., HTN, HLD, DM  -- We reviewed his risk factors for fatty liver  -- Reviewed weight loss strategies including dietary changes and physical activity. Discussed low carbohydrate, low sugar diet. Recommend weight loss goal of 10% (about 18 lb).     3. Liver lesion  -- Stable on imaging since 3/2019  -- No findings on CT or MRI to suggest HCC, AFP WNL  -- Monitoring w/ US per IR conference recommendations, repeat imaging in Feb     4. Right hand pain and tremor  -- Referral to Ortho hand clinic      Orders Placed This Encounter   Procedures    US Abdomen Limited    Ambulatory referral/consult to Orthopedics       *See AVS for patient education and instructions.      Return to clinic in 1 year.      Thank you for allowing me to participate in the care of OLMAN Perez-C  Hepatology        Duration of encounter: 30 min  This includes face to face time and non-face to face time preparing to see the patient (eg, review of tests), obtaining and/or reviewing separately obtained history, documenting clinical information in the electronic or other health record, independently interpreting results and communicating results to the patient/family/caregiver, or care coordination.

## 2022-08-11 NOTE — PATIENT INSTRUCTIONS
Ultrasound in Feb to monitor small liver lesion    Referral to [facility] for hand: [phone]    Continue weight loss efforts and trying to get cholesterol down for treatment of fatty liver. Liver enzymes should return to normal as you do this.      Fatty liver    There is no FDA approved therapy for non-alcoholic fatty liver disease (NAFLD); therefore, lifestyle changes are important:  1. Weight loss goal of 18 lbs  2. Low carb/sugar, high protein diet.   3. Exercise, 5 days per week, 30 minutes per day, as tolerated  4. Recommend good control of cholesterol, blood pressure, blood sugar levels (as these are risk factors for fatty liver). Cholesterol lowering medications including statins are typically safe and beneficial (even if liver enzymes are elevated).    5. Limit alcohol consumption, no more than 2 serving(s) of alcohol in any day (1 serving is 5 ounces of wine, 12 ounces of beer, or 1.5 ounces of liquor). Do not recommend daily alcohol use.      In some people, fatty liver can progress to steatohepatitis (inflamatory fatty liver) and possibly to cirrhosis, putting one at increased risk for liver cancer and liver failure. Lifestyle changes can help to decrease this risk.     Ask about our fatty liver/GRIMES clinical trials if you have fibrosis / scar tissue related to fatty liver.        Additional Resources:    Websites with information about fatty liver and inflammation related to fatty liver (GRIMES): www.nashtruth.com and www.nashactually.com   [facility]: Non-Alcoholic Fatty Liver Disease (NAFLD)    Facebook support group with tips and recipes:   Non-Alcoholic Fatty Liver Disease (NAFLD) Diet & Nutrition Support     Can download MyFitness Pal or Lose It sharifa to add up your carbohydrates throughout the day.   Tip -- Avoid beverages with calories or carbohydrates.   Try www.dietTHUBITor.ArticleAlley for recipes.    If interested in seeing a dietician to create a weight loss plan, contact the dietician team at [facility]: [email or web address].  You can also call to schedule a consult with a dietician: [phone]  *Virtual visits are available with one of our dieticians.     If you have diabetes or high blood pressure, you can meet with a Health  through [facility]. Let us know if you are interested in a referral.     Let us know if you are interested in a referral to [facility].

## 2022-08-11 NOTE — PROCEDURES
FibroScan (Vibration Controlled Transient Elastography)    Date/Time: 8/11/2022 8:00 AM  Performed by: Carmen Olvera NP  Authorized by: Carmen Olvera NP     Diagnosis:  NAFLD    Probe:  M    Universal Protocol: Patient's identity, procedure and site were verified, confirmatory pause was performed.  Discussed procedure including risks and potential complications.  Questions answered.  Patient verbalizes understanding and wishes to proceed with VCTE.     Procedure: After providing explanations of the procedure, patient was placed in the supine position with right arm in maximum abduction to allow optimal exposure of right lateral abdomen.  Patient was briefly assessed, Testing was performed in the mid-axillary location, 50Hz Shear Wave pulses were applied and the resulting Shear Wave and Propagation Speed detected with a 3.5 MHz ultrasonic signal, using the FibroScan probe, Skin to liver capsule distance and liver parenchyma were accessed during the entire examination with the FibroScan probe, Patient was instructed to breathe normally and to abstain from sudden movements during the procedure, allowing for random measurements of liver stiffness. At least 10 Shear Waves were produced, Individual measurements of each Shear Wave were calculated.  Patient tolerated the procedure well with no complications.  Meets discharge criteria as was dismissed.  Rates pain 0 out of 10.  Patient will follow up with ordering provider to review results.      Findings  Median liver stiffness score:  5.1  CAP Reading: dB/m:  317    IQR/med %:  20  Interpretation  Fibrosis interpretation is based on medial liver stiffness - Kilopascal (kPa).    Fibrosis Stage:  F 0-1  Steatosis interpretation is based on controlled attenuation parameter - (dB/m).    Steatosis Grade:  S3

## 2022-09-17 DIAGNOSIS — Z12.11 ENCOUNTER FOR SCREENING COLONOSCOPY FOR NON-HIGH-RISK PATIENT: Primary | ICD-10-CM

## 2022-10-10 ENCOUNTER — PATIENT MESSAGE (OUTPATIENT)
Dept: ADMINISTRATIVE | Facility: HOSPITAL | Age: 47
End: 2022-10-10
Payer: COMMERCIAL

## 2022-12-13 ENCOUNTER — PATIENT MESSAGE (OUTPATIENT)
Dept: ADMINISTRATIVE | Facility: HOSPITAL | Age: 47
End: 2022-12-13
Payer: COMMERCIAL

## 2023-01-26 ENCOUNTER — PATIENT OUTREACH (OUTPATIENT)
Dept: ADMINISTRATIVE | Facility: HOSPITAL | Age: 48
End: 2023-01-26
Payer: COMMERCIAL

## 2023-01-26 NOTE — PROGRESS NOTES
Health Maintenance Due   Topic Date Due    Colorectal Cancer Screening  Never done    Influenza Vaccine (1) 09/01/2022    Hemoglobin A1c  09/14/2022    Diabetes Urine Screening  03/14/2023    Foot Exam  03/14/2023          updated. Triggered LINKS and Care Everywhere. Chart reviewed for eye exam outreach.    Reta Telles LPN   Clinical Care Coordinator  Primary Care and Wellness

## 2023-02-13 ENCOUNTER — HOSPITAL ENCOUNTER (OUTPATIENT)
Dept: RADIOLOGY | Facility: HOSPITAL | Age: 48
Discharge: HOME | End: 2023-02-13
Attending: NURSE PRACTITIONER
Payer: COMMERCIAL

## 2023-02-13 DIAGNOSIS — R16.0 LIVER MASS: ICD-10-CM

## 2023-02-13 PROCEDURE — 76705 ECHO EXAM OF ABDOMEN: CPT | Mod: TC

## 2023-02-13 PROCEDURE — 76705 ECHO EXAM OF ABDOMEN: CPT | Mod: 26,,, | Performed by: RADIOLOGY

## 2023-02-13 PROCEDURE — 76705 US ABDOMEN LIMITED: ICD-10-PCS | Mod: 26,,, | Performed by: RADIOLOGY

## 2023-03-14 ENCOUNTER — PATIENT MESSAGE (OUTPATIENT)
Dept: ADMINISTRATIVE | Facility: HOSPITAL | Age: 48
End: 2023-03-14
Payer: COMMERCIAL

## 2023-03-21 ENCOUNTER — TELEPHONE (OUTPATIENT)
Dept: HEPATOLOGY | Facility: CLINIC | Age: 48
End: 2023-03-21
Payer: COMMERCIAL

## 2023-03-21 DIAGNOSIS — R16.0 LIVER MASS: Primary | ICD-10-CM

## 2023-03-21 DIAGNOSIS — K76.0 NAFLD (NONALCOHOLIC FATTY LIVER DISEASE): ICD-10-CM

## 2023-03-21 NOTE — TELEPHONE ENCOUNTER
----- Message from Roger Lopes MD sent at 3/20/2023  4:00 PM CDT -----  I think it looks about the same. Get an MRI w Eovist on the next f/u.    It may be an FNH.    Roger    ----- Message -----  From: Carmen Olvera NP  Sent: 3/20/2023   9:55 AM CDT  To: Roger Lopes MD    Do you think his liver lesion appears stable? We've reviewed him in IR conference more than once with recommendation for US surveillance. Has fatty liver but no fibrosis. Lesion noted on imaging since 2019. Thanks!

## 2023-03-21 NOTE — TELEPHONE ENCOUNTER
Please call patient with -  Let him know that his ultrasound was reviewed by our liver radiologists. The liver lesion we have been monitoring appear stable, no concerning features. The recommendation was to get another MRI with a special type of contrast (Eovist) in about 6 months in attempt to better characterize this. I will also mail a result letter. He can complete this anytime prior to our follow-up visit in August. Thanks!

## 2023-03-24 NOTE — TELEPHONE ENCOUNTER
Received message from Carmen Olvera NP to contact patient with message via an .      Call placed to the Language Line [phone]. Spoke with  Gabino # [id number].      Call placed to the patient at [phone]. No Answer.   Message from Carmen Olvera left on VM message.    6 month follow up MRI Abdomen is scheduled for Sat 8/19/23 at 8:[facility].  Follow Up Visit with Carmen is scheduled for Wed 8/23/23 at 9 am.  This was included in the message also.    Appt reminders placed in the mail.

## 2023-05-01 ENCOUNTER — TELEPHONE (OUTPATIENT)
Dept: INTERNAL MEDICINE | Facility: CLINIC | Age: 48
End: 2023-05-01
Payer: COMMERCIAL

## 2023-05-11 ENCOUNTER — TELEPHONE (OUTPATIENT)
Dept: INTERNAL MEDICINE | Facility: CLINIC | Age: 48
End: 2023-05-11
Payer: COMMERCIAL

## 2023-05-11 NOTE — TELEPHONE ENCOUNTER
----- Message from Vern Munoz sent at 5/11/2023 10:28 AM CDT -----  Who Called: pt       What is the reqeust in detail: is requesting for all rx to be refill. Please advise     [id number] - [place] - [address]  AT [facility] [address]  [address] [place] [address]  Phone: [phone] Fax: [phone]  Hours: Open 24 hours      Can the clinic reply by [facility]? No       Best Call Back Number: [phone] (home)         Additional Information:

## 2023-05-24 DIAGNOSIS — I10 HYPERTENSION, WELL CONTROLLED: ICD-10-CM

## 2023-05-24 DIAGNOSIS — E11.9 TYPE 2 DIABETES MELLITUS WITHOUT COMPLICATION: ICD-10-CM

## 2023-05-24 DIAGNOSIS — R80.9 TYPE 2 DIABETES MELLITUS WITH MICROALBUMINURIA, WITHOUT LONG-TERM CURRENT USE OF INSULIN: ICD-10-CM

## 2023-05-24 DIAGNOSIS — E11.29 TYPE 2 DIABETES MELLITUS WITH MICROALBUMINURIA, WITHOUT LONG-TERM CURRENT USE OF INSULIN: ICD-10-CM

## 2023-05-24 DIAGNOSIS — E11.9 TYPE 2 DIABETES MELLITUS WITHOUT COMPLICATION, UNSPECIFIED WHETHER LONG TERM INSULIN USE: ICD-10-CM

## 2023-05-24 NOTE — TELEPHONE ENCOUNTER
Care Due:                  Date            Visit Type   Department     Provider  --------------------------------------------------------------------------------                                EP -                              PRIMARY      [facility] INTERNAL  Last Visit: 03-      CARE ([facility])   ROSIBEL BETTS                              EP -                              PRIMARY      [facility] INTERNAL  Next Visit: 07-      CARE ([facility])   MEDICINE       TRINITY BETTS                                                            Last  Test          Frequency    Reason                     Performed    Due Date  --------------------------------------------------------------------------------    CMP.........  12 months..  FARXIGA,                   03-   03-                             amLODIPine-benazepriL,                             atorvastatin, metFORMIN..    HBA1C.......  6 months...  FARXIGA, dulaglutide,      03-   09-                             metFORMIN................    Lipid Panel.  12 months..  atorvastatin.............  03-   03-    [facility] Embedded Care Due Messages. Reference number: [id number].   5/24/2023 1:54:37 PM CDT   Warm/Dry

## 2023-05-24 NOTE — TELEPHONE ENCOUNTER
----- Message from Cherie Forte sent at 5/24/2023 12:48 PM CDT -----  Contact: [phone]  ///Requesting an RX refill or new RX.  Is this a refill or new RX: refill  RX name and strength (copy/paste from chart):  FARXIGA 5 mg Tab tablet  Is this a 30 day or 90 day RX: 90  Pharmacy name and phone # (copy/paste from chart):    [facility] #[id number] - [place] [address] [place] [address]  Phone: [phone] Fax: [phone]        ///Requesting an RX refill or new RX.  Is this a refill or new RX: refill   RX name and strength (copy/paste from chart):  amLODIPine-benazepriL (LOTREL) 10-40 mg per capsule  Is this a 30 day or 90 day RX: 90  Pharmacy name and phone # (copy/paste from chart):    [facility] #[id number]  [place] [address] [address]  AT [address] [place] [address]  Phone: [phone] Fax: [phone]    ///Requesting an RX refill or new RX.  Is this a refill or new RX: refill   RX name and strength (copy/paste from chart):  metFORMIN (GLUCOPHAGE) 1000 MG tablet  Is this a 30 day or 90 day RX: 90  Pharmacy name and phone # (copy/paste from chart):    [facility] #[id number]  [place] [address]  AT [address] [place] [address]  Phone: [phone] Fax: [phone]      ///Requesting an RX refill or new RX.  Is this a refill or new RX: refill   RX name and strength (copy/paste from chart):  atorvastatin (LIPITOR) 20 MG tablet  Is this a 30 day or 90 day RX: 90  Pharmacy name and phone # (copy/paste from chart):    [facility] #[id number] - [place] - [address]  AT [facility] OF [address]  [address] [place] [address]  Phone: [phone] Fax: [phone]      The doctors have asked that we provide their patients with the following 2 reminders -- prescription refills can take up to 72 hours, and  a friendly reminder that in the future you can use your [facility] account to request refills:

## 2023-05-24 NOTE — TELEPHONE ENCOUNTER
Refill Routing Note   Medication(s) are not appropriate for processing by [facility] for the following reason(s):      Required labs outdated: CMP & lipid panel  Required vitals abnormal: 164/108    [facility] action(s):  Defer Care Due:  Labs due          Appointments  past 12m or future 3m with PCP    Date Provider   Last Visit   3/14/2022 Yonas Najera MD   Next Visit   7/18/2023 Yonas Najera MD   ED visits in past 90 days: 0        Note composed:2:20 PM 05/24/2023

## 2023-05-25 RX ORDER — ATORVASTATIN CALCIUM 20 MG/1
20 TABLET, FILM COATED ORAL DAILY
Qty: 90 TABLET | Refills: 3 | Status: SHIPPED | OUTPATIENT
Start: 2023-05-25 | End: 2023-07-18 | Stop reason: SDUPTHER

## 2023-05-25 RX ORDER — METFORMIN HYDROCHLORIDE 1000 MG/1
1000 TABLET ORAL 2 TIMES DAILY WITH MEALS
Qty: 180 TABLET | Refills: 0 | Status: SHIPPED | OUTPATIENT
Start: 2023-05-25 | End: 2023-07-18 | Stop reason: SDUPTHER

## 2023-05-25 RX ORDER — AMLODIPINE AND BENAZEPRIL HYDROCHLORIDE 10; 40 MG/1; MG/1
1 CAPSULE ORAL DAILY
Qty: 90 CAPSULE | Refills: 3 | Status: SHIPPED | OUTPATIENT
Start: 2023-05-25 | End: 2023-07-18 | Stop reason: SDUPTHER

## 2023-05-29 ENCOUNTER — PATIENT MESSAGE (OUTPATIENT)
Dept: ADMINISTRATIVE | Facility: HOSPITAL | Age: 48
End: 2023-05-29
Payer: COMMERCIAL

## 2023-07-18 ENCOUNTER — OFFICE VISIT (OUTPATIENT)
Dept: INTERNAL MEDICINE | Facility: CLINIC | Age: 48
End: 2023-07-18
Payer: COMMERCIAL

## 2023-07-18 VITALS
BODY MASS INDEX: 28.79 KG/M2 | HEIGHT: 67 IN | OXYGEN SATURATION: 97 % | HEART RATE: 98 BPM | SYSTOLIC BLOOD PRESSURE: 190 MMHG | WEIGHT: 183.44 LBS | DIASTOLIC BLOOD PRESSURE: 130 MMHG

## 2023-07-18 DIAGNOSIS — E11.29 TYPE 2 DIABETES MELLITUS WITH MICROALBUMINURIA, WITHOUT LONG-TERM CURRENT USE OF INSULIN: ICD-10-CM

## 2023-07-18 DIAGNOSIS — R80.9 TYPE 2 DIABETES MELLITUS WITH MICROALBUMINURIA, WITHOUT LONG-TERM CURRENT USE OF INSULIN: ICD-10-CM

## 2023-07-18 DIAGNOSIS — Z00.00 ANNUAL PHYSICAL EXAM: ICD-10-CM

## 2023-07-18 DIAGNOSIS — I10 HYPERTENSION, UNCONTROLLED: Primary | ICD-10-CM

## 2023-07-18 PROCEDURE — 3008F PR BODY MASS INDEX (BMI) DOCUMENTED: ICD-10-PCS | Mod: CPTII,S$GLB,, | Performed by: INTERNAL MEDICINE

## 2023-07-18 PROCEDURE — 1159F MED LIST DOCD IN RCRD: CPT | Mod: CPTII,S$GLB,, | Performed by: INTERNAL MEDICINE

## 2023-07-18 PROCEDURE — 3080F DIAST BP >= 90 MM HG: CPT | Mod: CPTII,S$GLB,, | Performed by: INTERNAL MEDICINE

## 2023-07-18 PROCEDURE — 3080F PR MOST RECENT DIASTOLIC BLOOD PRESSURE >= 90 MM HG: ICD-10-PCS | Mod: CPTII,S$GLB,, | Performed by: INTERNAL MEDICINE

## 2023-07-18 PROCEDURE — 3077F PR MOST RECENT SYSTOLIC BLOOD PRESSURE >= 140 MM HG: ICD-10-PCS | Mod: CPTII,S$GLB,, | Performed by: INTERNAL MEDICINE

## 2023-07-18 PROCEDURE — 1160F RVW MEDS BY RX/DR IN RCRD: CPT | Mod: CPTII,S$GLB,, | Performed by: INTERNAL MEDICINE

## 2023-07-18 PROCEDURE — 99999 PR PBB SHADOW E&M-EST. PATIENT-LVL III: ICD-10-PCS | Mod: PBBFAC,,, | Performed by: INTERNAL MEDICINE

## 2023-07-18 PROCEDURE — 1160F PR REVIEW ALL MEDS BY PRESCRIBER/CLIN PHARMACIST DOCUMENTED: ICD-10-PCS | Mod: CPTII,S$GLB,, | Performed by: INTERNAL MEDICINE

## 2023-07-18 PROCEDURE — 99999 PR PBB SHADOW E&M-EST. PATIENT-LVL III: CPT | Mod: PBBFAC,,, | Performed by: INTERNAL MEDICINE

## 2023-07-18 PROCEDURE — 99396 PREV VISIT EST AGE 40-64: CPT | Mod: S$GLB,,, | Performed by: INTERNAL MEDICINE

## 2023-07-18 PROCEDURE — 3008F BODY MASS INDEX DOCD: CPT | Mod: CPTII,S$GLB,, | Performed by: INTERNAL MEDICINE

## 2023-07-18 PROCEDURE — 1159F PR MEDICATION LIST DOCUMENTED IN MEDICAL RECORD: ICD-10-PCS | Mod: CPTII,S$GLB,, | Performed by: INTERNAL MEDICINE

## 2023-07-18 PROCEDURE — 4010F PR ACE/ARB THEARPY RXD/TAKEN: ICD-10-PCS | Mod: CPTII,S$GLB,, | Performed by: INTERNAL MEDICINE

## 2023-07-18 PROCEDURE — 4010F ACE/ARB THERAPY RXD/TAKEN: CPT | Mod: CPTII,S$GLB,, | Performed by: INTERNAL MEDICINE

## 2023-07-18 PROCEDURE — 99396 PR PREVENTIVE VISIT,EST,40-64: ICD-10-PCS | Mod: S$GLB,,, | Performed by: INTERNAL MEDICINE

## 2023-07-18 PROCEDURE — 3077F SYST BP >= 140 MM HG: CPT | Mod: CPTII,S$GLB,, | Performed by: INTERNAL MEDICINE

## 2023-07-18 RX ORDER — ATORVASTATIN CALCIUM 20 MG/1
20 TABLET, FILM COATED ORAL DAILY
Qty: 90 TABLET | Refills: 3 | Status: SHIPPED | OUTPATIENT
Start: 2023-07-18 | End: 2023-10-23 | Stop reason: SDUPTHER

## 2023-07-18 RX ORDER — METFORMIN HYDROCHLORIDE 1000 MG/1
1000 TABLET ORAL 2 TIMES DAILY WITH MEALS
Qty: 180 TABLET | Refills: 3 | Status: SHIPPED | OUTPATIENT
Start: 2023-07-18

## 2023-07-18 RX ORDER — DAPAGLIFLOZIN 5 MG/1
5 TABLET, FILM COATED ORAL DAILY
Qty: 90 TABLET | Refills: 3 | Status: SHIPPED | OUTPATIENT
Start: 2023-07-18 | End: 2023-10-23 | Stop reason: SDUPTHER

## 2023-07-18 RX ORDER — AMLODIPINE AND BENAZEPRIL HYDROCHLORIDE 10; 40 MG/1; MG/1
1 CAPSULE ORAL DAILY
Qty: 90 CAPSULE | Refills: 3 | Status: SHIPPED | OUTPATIENT
Start: 2023-07-18 | End: 2023-08-21

## 2023-07-18 NOTE — PROGRESS NOTES
"    CHIEF COMPLAINT     Chief Complaint   Patient presents with    Annual Exam     Concerned about bp    Hypertension    Diabetes       HPI     Mark Rivera is a 47 y.o. male HTN, DM, HLD here today for annual exam.      He has been out of his BP medication x3 months. Patient was unaware that meds were at his pharmacy and he did not call our office or the pharmacy because of language barrier.    Denies SOB, HA, CP, LE swelling, confusion    Personally Reviewed Patient's Medical, surgical, family and social hx. Changes updated in [facility].  Care Team updated in Epic    Review of Systems:  Review of Systems   Cardiovascular:  Negative for chest pain and leg swelling.   Psychiatric/Behavioral:  Negative for confusion.      Health Maintenance:   Reviewed with patient  Due for the following:      PHYSICAL EXAM     BP (!) 190/130 (BP Location: Right arm, Patient Position: Sitting, BP Method: Large (Manual))   Pulse 98   Ht 5' 7" (1.702 m)   Wt 83.2 kg (183 lb 6.8 oz)   SpO2 97%   BMI 28.73 kg/m²     Gen: Well Appearing, NAD  HEENT: PERR, EOMI  Neck: FROM, no thyromegaly, no cervical adenopathy  CVD: RRR, no M/R/G  Pulm: Normal work of breathing, CTAB, no wheezing  Abd:  Soft, NT, ND non TTP, no mass  MSK: no LE edema  Neuro: A&Ox3, gait normal, speech normal  Mood; Mood normal, behavior normal, thought process linear       LABS     Labs reviewed; Notable for    ASSESSMENT     1. Hypertension, uncontrolled  amLODIPine-benazepriL (LOTREL) 10-40 mg per capsule    CANCELED: Hemoglobin A1C      2. Annual physical exam  CBC Auto Differential    Comprehensive Metabolic Panel    Hemoglobin A1C    Lipid Panel      3. Type 2 diabetes mellitus with microalbuminuria, without long-term current use of insulin  atorvastatin (LIPITOR) 20 MG tablet    metFORMIN (GLUCOPHAGE) 1000 MG tablet    dapagliflozin propanediol (FARXIGA) 5 mg Tab tablet    CANCELED: Hemoglobin A1C              Plan     Mark Rivera is a 47 y.o. male with DM HTN " HLD    1. Annual physical exam  Updated problem list, medical history, care team and discussed HM.     - CBC Auto Differential; Future  - Comprehensive Metabolic Panel; Future  - Hemoglobin A1C; Future  - Lipid Panel; Future    2. Hypertension, uncontrolled  No signs of HTN emergency, not in distress. Will restart medication and have him f/u with me 1m  He will  medication today and start checking BP at home.  Instructed to have a friend call us if this occurs in the future.  - amLODIPine-benazepriL (LOTREL) 10-40 mg per capsule; Take 1 capsule by mouth once daily.  Dispense: 90 capsule; Refill: 3    3. Type 2 diabetes mellitus with microalbuminuria, without long-term current use of insulin  Will recheck a1c overdue for eye exam.   Will defer foot exam 2/2 elevated BP  Will address when he RTC  in 1m  - atorvastatin (LIPITOR) 20 MG tablet; Take 1 tablet (20 mg total) by mouth once daily.  Dispense: 90 tablet; Refill: 3  - metFORMIN (GLUCOPHAGE) 1000 MG tablet; Take 1 tablet (1,000 mg total) by mouth 2 (two) times daily with meals.  Dispense: 180 tablet; Refill: 3  - dapagliflozin propanediol (FARXIGA) 5 mg Tab tablet; Take 1 tablet (5 mg total) by mouth once daily.  Dispense: 90 tablet; Refill: 3      Yonas Najera MD

## 2023-07-25 ENCOUNTER — TELEPHONE (OUTPATIENT)
Dept: HEPATOLOGY | Facility: CLINIC | Age: 48
End: 2023-07-25
Payer: COMMERCIAL

## 2023-07-25 ENCOUNTER — LAB VISIT (OUTPATIENT)
Dept: LAB | Facility: HOSPITAL | Age: 48
End: 2023-07-25
Attending: INTERNAL MEDICINE
Payer: COMMERCIAL

## 2023-07-25 DIAGNOSIS — Z00.00 ANNUAL PHYSICAL EXAM: ICD-10-CM

## 2023-07-25 DIAGNOSIS — R16.0 LIVER MASS: ICD-10-CM

## 2023-07-25 DIAGNOSIS — K76.0 NAFLD (NONALCOHOLIC FATTY LIVER DISEASE): Primary | ICD-10-CM

## 2023-07-25 LAB
ALBUMIN SERPL BCP-MCNC: 3.8 G/DL (ref 3.5–5.2)
ALP SERPL-CCNC: 77 U/L (ref 55–135)
ALT SERPL W/O P-5'-P-CCNC: 66 U/L (ref 10–44)
ANION GAP SERPL CALC-SCNC: 11 MMOL/L (ref 8–16)
AST SERPL-CCNC: 37 U/L (ref 10–40)
BASOPHILS # BLD AUTO: 0.1 K/UL (ref 0–0.2)
BASOPHILS NFR BLD: 1.2 % (ref 0–1.9)
BILIRUB SERPL-MCNC: 0.6 MG/DL (ref 0.1–1)
BUN SERPL-MCNC: 15 MG/DL (ref 6–20)
CALCIUM SERPL-MCNC: 9.6 MG/DL (ref 8.7–10.5)
CHLORIDE SERPL-SCNC: 107 MMOL/L (ref 95–110)
CHOLEST SERPL-MCNC: 184 MG/DL (ref 120–199)
CHOLEST/HDLC SERPL: 4.5 {RATIO} (ref 2–5)
CO2 SERPL-SCNC: 23 MMOL/L (ref 23–29)
CREAT SERPL-MCNC: 1.1 MG/DL (ref 0.5–1.4)
DIFFERENTIAL METHOD: ABNORMAL
EOSINOPHIL # BLD AUTO: 0.3 K/UL (ref 0–0.5)
EOSINOPHIL NFR BLD: 4.1 % (ref 0–8)
ERYTHROCYTE [DISTWIDTH] IN BLOOD BY AUTOMATED COUNT: 13.3 % (ref 11.5–14.5)
EST. GFR  (NO RACE VARIABLE): >60 ML/MIN/1.73 M^2
ESTIMATED AVG GLUCOSE: 212 MG/DL (ref 68–131)
GLUCOSE SERPL-MCNC: 171 MG/DL (ref 70–110)
HBA1C MFR BLD: 9 % (ref 4–5.6)
HCT VFR BLD AUTO: 55.8 % (ref 40–54)
HDLC SERPL-MCNC: 41 MG/DL (ref 40–75)
HDLC SERPL: 22.3 % (ref 20–50)
HGB BLD-MCNC: 18.2 G/DL (ref 14–18)
IMM GRANULOCYTES # BLD AUTO: 0.04 K/UL (ref 0–0.04)
IMM GRANULOCYTES NFR BLD AUTO: 0.5 % (ref 0–0.5)
LDLC SERPL CALC-MCNC: ABNORMAL MG/DL (ref 63–159)
LYMPHOCYTES # BLD AUTO: 2.8 K/UL (ref 1–4.8)
LYMPHOCYTES NFR BLD: 33.9 % (ref 18–48)
MCH RBC QN AUTO: 29.2 PG (ref 27–31)
MCHC RBC AUTO-ENTMCNC: 32.6 G/DL (ref 32–36)
MCV RBC AUTO: 90 FL (ref 82–98)
MONOCYTES # BLD AUTO: 0.6 K/UL (ref 0.3–1)
MONOCYTES NFR BLD: 6.8 % (ref 4–15)
NEUTROPHILS # BLD AUTO: 4.4 K/UL (ref 1.8–7.7)
NEUTROPHILS NFR BLD: 53.5 % (ref 38–73)
NONHDLC SERPL-MCNC: 143 MG/DL
NRBC BLD-RTO: 0 /100 WBC
PLATELET # BLD AUTO: 276 K/UL (ref 150–450)
PMV BLD AUTO: 12.4 FL (ref 9.2–12.9)
POTASSIUM SERPL-SCNC: 4.4 MMOL/L (ref 3.5–5.1)
PROT SERPL-MCNC: 7.1 G/DL (ref 6–8.4)
RBC # BLD AUTO: 6.23 M/UL (ref 4.6–6.2)
SODIUM SERPL-SCNC: 141 MMOL/L (ref 136–145)
TRIGL SERPL-MCNC: 491 MG/DL (ref 30–150)
WBC # BLD AUTO: 8.2 K/UL (ref 3.9–12.7)

## 2023-07-25 PROCEDURE — 85025 COMPLETE CBC W/AUTO DIFF WBC: CPT | Performed by: INTERNAL MEDICINE

## 2023-07-25 PROCEDURE — 80061 LIPID PANEL: CPT | Performed by: INTERNAL MEDICINE

## 2023-07-25 PROCEDURE — 80053 COMPREHEN METABOLIC PANEL: CPT | Performed by: INTERNAL MEDICINE

## 2023-07-25 PROCEDURE — 83036 HEMOGLOBIN GLYCOSYLATED A1C: CPT | Performed by: INTERNAL MEDICINE

## 2023-07-25 PROCEDURE — 36415 COLL VENOUS BLD VENIPUNCTURE: CPT | Performed by: INTERNAL MEDICINE

## 2023-07-25 NOTE — TELEPHONE ENCOUNTER
Orders placed to complete MRI at [facility] though scan needs to be done with Eovist contrast. Will need to see if this is available at [facility].

## 2023-07-25 NOTE — TELEPHONE ENCOUNTER
Call placed to [facility] in [place] at [phone].    Spoke with Juliet in the MRI Dept if an MRI Abd W WO can be done with Eovist,  Juliet stated yes we can do it.    Just fax over with Order MRI Abd W WO w/Eovist, Demographics, and Last Encounter Note.    All information faxed to:  [facility]  Attn: Juliet  Fax: [phone]  Phone: [phone]    Received receipt of confirmation fax received.  Carmen notified.

## 2023-07-25 NOTE — TELEPHONE ENCOUNTER
----- Message from Carmen Olvera NP sent at 7/25/2023  3:39 PM CDT -----  Regarding: FW: MRI Orders  Contact: Javid TRAVIS  Please let me know if MRI w/ Eovist is available at [facility]. Gloria.    ----- Message -----  From: Altagracia Forte MA  Sent: 7/25/2023   2:14 PM CDT  To: Carmen Olvera NP  Subject: FW: MRI Orders                                   Patient's MRI is schld for 8/19 at [facility] but he would now like to go to [facility]. Can you place new outside orders so I can have them fax to [facility].   Altagracia    ----- Message -----  From: Jerica Troncoso  Sent: 7/25/2023  11:00 AM CDT  To: Wade Morales Staff  Subject: MRI Orders                                       [facility] is calling in regards to pt Orders for MRI. Pt is requesting orders faxed over to [facility]. Please adv        [facility]   [address] [facility]  Fax Number:[phone]

## 2023-08-07 ENCOUNTER — PATIENT OUTREACH (OUTPATIENT)
Dept: ADMINISTRATIVE | Facility: HOSPITAL | Age: 48
End: 2023-08-07
Payer: COMMERCIAL

## 2023-08-07 NOTE — PROGRESS NOTES
Health Maintenance Due   Topic Date Due    Colorectal Cancer Screening  Never done    COVID-19 Vaccine (6 - Moderna series) 04/21/2022    Foot Exam  03/14/2023    Eye Exam  03/14/2023      Chart reviewed. Triggered LINKS. Updated Care Everywhere.     Tanika Flores CMA  Population Health Care Coordinator  Primary Care Team

## 2023-08-10 ENCOUNTER — TELEPHONE (OUTPATIENT)
Dept: HEPATOLOGY | Facility: CLINIC | Age: 48
End: 2023-08-10
Payer: COMMERCIAL

## 2023-08-10 NOTE — TELEPHONE ENCOUNTER
External results reviewed, scanned into media.    MRI w/ Eovist ([facility]) 8/7/23:  - Hepatic steatosis  - No liver masses, previously identified area of abnormal enhancement in right lobe is no longer seen

## 2023-08-21 ENCOUNTER — PATIENT OUTREACH (OUTPATIENT)
Dept: ADMINISTRATIVE | Facility: HOSPITAL | Age: 48
End: 2023-08-21
Payer: COMMERCIAL

## 2023-08-21 ENCOUNTER — OFFICE VISIT (OUTPATIENT)
Dept: INTERNAL MEDICINE | Facility: CLINIC | Age: 48
End: 2023-08-21
Payer: COMMERCIAL

## 2023-08-21 VITALS
OXYGEN SATURATION: 97 % | HEART RATE: 90 BPM | SYSTOLIC BLOOD PRESSURE: 130 MMHG | HEIGHT: 67 IN | WEIGHT: 179.25 LBS | DIASTOLIC BLOOD PRESSURE: 100 MMHG | BODY MASS INDEX: 28.13 KG/M2

## 2023-08-21 DIAGNOSIS — I10 HYPERTENSION, UNCONTROLLED: Primary | ICD-10-CM

## 2023-08-21 DIAGNOSIS — E11.29 TYPE 2 DIABETES MELLITUS WITH MICROALBUMINURIA, WITHOUT LONG-TERM CURRENT USE OF INSULIN: ICD-10-CM

## 2023-08-21 DIAGNOSIS — R80.9 TYPE 2 DIABETES MELLITUS WITH MICROALBUMINURIA, WITHOUT LONG-TERM CURRENT USE OF INSULIN: ICD-10-CM

## 2023-08-21 PROCEDURE — 3062F PR POS MACROALBUMINURIA RESULT DOCUMENTED/REVIEW: ICD-10-PCS | Mod: CPTII,S$GLB,, | Performed by: INTERNAL MEDICINE

## 2023-08-21 PROCEDURE — 3066F NEPHROPATHY DOC TX: CPT | Mod: CPTII,S$GLB,, | Performed by: INTERNAL MEDICINE

## 2023-08-21 PROCEDURE — 3062F POS MACROALBUMINURIA REV: CPT | Mod: CPTII,S$GLB,, | Performed by: INTERNAL MEDICINE

## 2023-08-21 PROCEDURE — 4010F ACE/ARB THERAPY RXD/TAKEN: CPT | Mod: CPTII,S$GLB,, | Performed by: INTERNAL MEDICINE

## 2023-08-21 PROCEDURE — 3008F BODY MASS INDEX DOCD: CPT | Mod: CPTII,S$GLB,, | Performed by: INTERNAL MEDICINE

## 2023-08-21 PROCEDURE — 99214 PR OFFICE/OUTPT VISIT, EST, LEVL IV, 30-39 MIN: ICD-10-PCS | Mod: S$GLB,,, | Performed by: INTERNAL MEDICINE

## 2023-08-21 PROCEDURE — 4010F PR ACE/ARB THEARPY RXD/TAKEN: ICD-10-PCS | Mod: CPTII,S$GLB,, | Performed by: INTERNAL MEDICINE

## 2023-08-21 PROCEDURE — 99999 PR PBB SHADOW E&M-EST. PATIENT-LVL III: ICD-10-PCS | Mod: PBBFAC,,, | Performed by: INTERNAL MEDICINE

## 2023-08-21 PROCEDURE — 3008F PR BODY MASS INDEX (BMI) DOCUMENTED: ICD-10-PCS | Mod: CPTII,S$GLB,, | Performed by: INTERNAL MEDICINE

## 2023-08-21 PROCEDURE — 99999 PR PBB SHADOW E&M-EST. PATIENT-LVL III: CPT | Mod: PBBFAC,,, | Performed by: INTERNAL MEDICINE

## 2023-08-21 PROCEDURE — 3052F HG A1C>EQUAL 8.0%<EQUAL 9.0%: CPT | Mod: CPTII,S$GLB,, | Performed by: INTERNAL MEDICINE

## 2023-08-21 PROCEDURE — 3080F DIAST BP >= 90 MM HG: CPT | Mod: CPTII,S$GLB,, | Performed by: INTERNAL MEDICINE

## 2023-08-21 PROCEDURE — 1159F MED LIST DOCD IN RCRD: CPT | Mod: CPTII,S$GLB,, | Performed by: INTERNAL MEDICINE

## 2023-08-21 PROCEDURE — 99214 OFFICE O/P EST MOD 30 MIN: CPT | Mod: S$GLB,,, | Performed by: INTERNAL MEDICINE

## 2023-08-21 PROCEDURE — 1159F PR MEDICATION LIST DOCUMENTED IN MEDICAL RECORD: ICD-10-PCS | Mod: CPTII,S$GLB,, | Performed by: INTERNAL MEDICINE

## 2023-08-21 PROCEDURE — 3066F PR DOCUMENTATION OF TREATMENT FOR NEPHROPATHY: ICD-10-PCS | Mod: CPTII,S$GLB,, | Performed by: INTERNAL MEDICINE

## 2023-08-21 PROCEDURE — 3052F PR MOST RECENT HEMOGLOBIN A1C LEVEL 8.0 - < 9.0%: ICD-10-PCS | Mod: CPTII,S$GLB,, | Performed by: INTERNAL MEDICINE

## 2023-08-21 PROCEDURE — 3080F PR MOST RECENT DIASTOLIC BLOOD PRESSURE >= 90 MM HG: ICD-10-PCS | Mod: CPTII,S$GLB,, | Performed by: INTERNAL MEDICINE

## 2023-08-21 PROCEDURE — 3075F SYST BP GE 130 - 139MM HG: CPT | Mod: CPTII,S$GLB,, | Performed by: INTERNAL MEDICINE

## 2023-08-21 PROCEDURE — 3075F PR MOST RECENT SYSTOLIC BLOOD PRESS GE 130-139MM HG: ICD-10-PCS | Mod: CPTII,S$GLB,, | Performed by: INTERNAL MEDICINE

## 2023-08-21 RX ORDER — AMLODIPINE AND OLMESARTAN MEDOXOMIL 10; 40 MG/1; MG/1
1 TABLET ORAL DAILY
Qty: 90 TABLET | Refills: 3 | Status: SHIPPED | OUTPATIENT
Start: 2023-08-21 | End: 2023-10-23 | Stop reason: SDUPTHER

## 2023-08-21 NOTE — PROGRESS NOTES
Health Maintenance Due   Topic Date Due    Colorectal Cancer Screening  Never done    COVID-19 Vaccine (6 - Moderna series) 04/21/2022    Foot Exam  03/14/2023    Eye Exam  03/14/2023        updated. Triggered LINKS and Care Everywhere. Requested MRI record from [facility], per .    Reta Telles LPN   Clinical Care Coordinator  Primary Care and Wellness

## 2023-08-21 NOTE — PROGRESS NOTES
"    CHIEF COMPLAINT     Chief Complaint   Patient presents with    Follow-up     HTN       HPI     Mark FABRIZIO Rivera is a 47 y.o. male dm2 HTN here today for 1m f/u    At last visit restarted home BP and DM meds after being out for a few months. Reports compliance with lotrel 10-40 and Metformin 1000mg BID and farxiga 5mg    Compliant with meds   BP 130s/100s    Personally Reviewed Patient's Medical, surgical, family and social hx. Changes updated in [facility].  Care Team updated in Epic    Review of Systems:  Review of Systems   Respiratory:  Negative for cough and shortness of breath.    Neurological:  Negative for headaches.       Health Maintenance:   Reviewed with patient  Due for the following:      PHYSICAL EXAM     BP (!) 130/100 (BP Location: Right arm, Patient Position: Sitting, BP Method: Large (Manual))   Pulse 90   Ht 5' 7" (1.702 m)   Wt 81.3 kg (179 lb 3.7 oz)   SpO2 97%   BMI 28.07 kg/m²     Gen: Well Appearing, NAD  HEENT: PERR, EOMI  Neck: FROM, no thyromegaly, no cervical adenopathy  CVD: RRR, no M/R/G  Pulm: Normal work of breathing, CTAB, no wheezing  Abd:  Soft, NT, ND non TTP, no mass  MSK: no LE edema  Neuro: A&Ox3, gait normal, speech normal  Mood; Mood normal, behavior normal, thought process linear   Diabetic Foot exam  Skin intact 2+ pedal pulse, no skin breakdown or ulcers between toes, proprioception and monofilament test normal.      LABS     Labs reviewed; Notable for  Lab Results   Component Value Date    HGBA1C 9.0 (H) 07/25/2023       Chemistry        Component Value Date/Time     07/25/2023 0939    K 4.4 07/25/2023 0939     07/25/2023 0939    CO2 23 07/25/2023 0939    BUN 15 07/25/2023 0939    CREATININE 1.1 07/25/2023 0939     (H) 07/25/2023 0939        Component Value Date/Time    CALCIUM 9.6 07/25/2023 0939    ALKPHOS 77 07/25/2023 0939    AST 37 07/25/2023 0939    ALT 66 (H) 07/25/2023 0939    BILITOT 0.6 07/25/2023 0939    ESTGFRAFRICA >60.0 03/14/2022 1607    " EGFRNONAA >60.0 03/14/2022 1607          ASSESSMENT     1. Hypertension, uncontrolled  amlodipine-olmesartan (JAMES) 10-40 mg per tablet      2. Type 2 diabetes mellitus with microalbuminuria, without long-term current use of insulin  Hemoglobin A1C    Comprehensive Metabolic Panel              Plan     Mark Rivera is a 47 y.o. male with hypertension type 2 diabetes as well as fatty liver disease.  Will get MRI report from [facility]    1. Hypertension, uncontrolled  Improved but not at goal  Will switch from lotrel to James to see if we can get improved diastolic BP  - amlodipine-olmesartan (JAMES) 10-40 mg per tablet; Take 1 tablet by mouth once daily.  Dispense: 90 tablet; Refill: 3    2. Type 2 diabetes mellitus with microalbuminuria, without long-term current use of insulin  Continue metformin 1000mg BID and farxiga 5mg  Recheck a1c in 6 weeks.  - Hemoglobin A1C; Future  - Comprehensive Metabolic Panel; Future      Yonas Najera MD

## 2023-08-21 NOTE — LETTER
AUTHORIZATION FOR RELEASE OF   CONFIDENTIAL INFORMATION    Dear [facility],    We are seeing Mark Rivera, date of birth 1975, in the clinic at [facility]. Yonas Najera MD is the patient's PCP. Mark Rivera has an outstanding lab/procedure at the time we reviewed his chart. In order to help keep his health information updated, he has authorized us to request the following medical record(s):        (  )  MAMMOGRAM                                      (  )  COLONOSCOPY      (  )  PAP SMEAR                                          (  )  OUTSIDE LAB RESULTS     (  )  DEXA SCAN                                          (  )  EYE EXAM            (  )  FOOT EXAM                                          (  )  ENTIRE RECORD     (  )  OUTSIDE IMMUNIZATIONS                 (  x)  Abdominal MRI       Please fax records to [facility], [place], Samuel T., MD, [phone]     If you have any questions, please contact Reta Telles LPN at [phone].           Patient Name: Mark Rivera  : 1975  Patient Phone #: [phone]

## 2023-08-22 ENCOUNTER — PATIENT OUTREACH (OUTPATIENT)
Dept: ADMINISTRATIVE | Facility: HOSPITAL | Age: 48
End: 2023-08-22
Payer: COMMERCIAL

## 2023-08-22 NOTE — PROGRESS NOTES
Health Maintenance Due   Topic Date Due    Colorectal Cancer Screening  Never done    COVID-19 Vaccine (6 - Moderna series) 04/21/2022    Foot Exam  03/14/2023    Eye Exam  03/14/2023      updated. MRI of Liver imported to chart and sent to PCP.    Reta Telles LPN   Clinical Care Coordinator  Primary Care and Wellness

## 2023-08-23 ENCOUNTER — OFFICE VISIT (OUTPATIENT)
Dept: HEPATOLOGY | Facility: CLINIC | Age: 48
End: 2023-08-23
Payer: COMMERCIAL

## 2023-08-23 VITALS — HEIGHT: 67 IN | BODY MASS INDEX: 28.52 KG/M2 | WEIGHT: 181.69 LBS

## 2023-08-23 DIAGNOSIS — K76.0 NAFLD (NONALCOHOLIC FATTY LIVER DISEASE): Primary | ICD-10-CM

## 2023-08-23 DIAGNOSIS — R74.8 ELEVATED LIVER ENZYMES: ICD-10-CM

## 2023-08-23 DIAGNOSIS — R80.9 TYPE 2 DIABETES MELLITUS WITH MICROALBUMINURIA, WITHOUT LONG-TERM CURRENT USE OF INSULIN: ICD-10-CM

## 2023-08-23 DIAGNOSIS — E11.29 TYPE 2 DIABETES MELLITUS WITH MICROALBUMINURIA, WITHOUT LONG-TERM CURRENT USE OF INSULIN: ICD-10-CM

## 2023-08-23 DIAGNOSIS — E66.3 OVERWEIGHT (BMI 25.0-29.9): ICD-10-CM

## 2023-08-23 DIAGNOSIS — E78.1 HYPERTRIGLYCERIDEMIA: ICD-10-CM

## 2023-08-23 DIAGNOSIS — R16.0 LIVER MASS: ICD-10-CM

## 2023-08-23 PROCEDURE — 99999 PR PBB SHADOW E&M-EST. PATIENT-LVL III: ICD-10-PCS | Mod: PBBFAC,,, | Performed by: NURSE PRACTITIONER

## 2023-08-23 PROCEDURE — 3052F PR MOST RECENT HEMOGLOBIN A1C LEVEL 8.0 - < 9.0%: ICD-10-PCS | Mod: CPTII,S$GLB,, | Performed by: NURSE PRACTITIONER

## 2023-08-23 PROCEDURE — 4010F ACE/ARB THERAPY RXD/TAKEN: CPT | Mod: CPTII,S$GLB,, | Performed by: NURSE PRACTITIONER

## 2023-08-23 PROCEDURE — 3052F HG A1C>EQUAL 8.0%<EQUAL 9.0%: CPT | Mod: CPTII,S$GLB,, | Performed by: NURSE PRACTITIONER

## 2023-08-23 PROCEDURE — 3062F POS MACROALBUMINURIA REV: CPT | Mod: CPTII,S$GLB,, | Performed by: NURSE PRACTITIONER

## 2023-08-23 PROCEDURE — 3066F NEPHROPATHY DOC TX: CPT | Mod: CPTII,S$GLB,, | Performed by: NURSE PRACTITIONER

## 2023-08-23 PROCEDURE — 1159F MED LIST DOCD IN RCRD: CPT | Mod: CPTII,S$GLB,, | Performed by: NURSE PRACTITIONER

## 2023-08-23 PROCEDURE — 99214 PR OFFICE/OUTPT VISIT, EST, LEVL IV, 30-39 MIN: ICD-10-PCS | Mod: S$GLB,,, | Performed by: NURSE PRACTITIONER

## 2023-08-23 PROCEDURE — 1159F PR MEDICATION LIST DOCUMENTED IN MEDICAL RECORD: ICD-10-PCS | Mod: CPTII,S$GLB,, | Performed by: NURSE PRACTITIONER

## 2023-08-23 PROCEDURE — 99214 OFFICE O/P EST MOD 30 MIN: CPT | Mod: S$GLB,,, | Performed by: NURSE PRACTITIONER

## 2023-08-23 PROCEDURE — 3008F BODY MASS INDEX DOCD: CPT | Mod: CPTII,S$GLB,, | Performed by: NURSE PRACTITIONER

## 2023-08-23 PROCEDURE — 99999 PR PBB SHADOW E&M-EST. PATIENT-LVL III: CPT | Mod: PBBFAC,,, | Performed by: NURSE PRACTITIONER

## 2023-08-23 PROCEDURE — 3062F PR POS MACROALBUMINURIA RESULT DOCUMENTED/REVIEW: ICD-10-PCS | Mod: CPTII,S$GLB,, | Performed by: NURSE PRACTITIONER

## 2023-08-23 PROCEDURE — 3066F PR DOCUMENTATION OF TREATMENT FOR NEPHROPATHY: ICD-10-PCS | Mod: CPTII,S$GLB,, | Performed by: NURSE PRACTITIONER

## 2023-08-23 PROCEDURE — 4010F PR ACE/ARB THEARPY RXD/TAKEN: ICD-10-PCS | Mod: CPTII,S$GLB,, | Performed by: NURSE PRACTITIONER

## 2023-08-23 PROCEDURE — 3008F PR BODY MASS INDEX (BMI) DOCUMENTED: ICD-10-PCS | Mod: CPTII,S$GLB,, | Performed by: NURSE PRACTITIONER

## 2023-08-23 NOTE — PROGRESS NOTES
[facility] - Established Patient    Last Clinic Visit: 8/11/22    Chief Complaint: Follow-up for fatty liver, liver mass         HISTORY     This is a 47 y.o. male with PMH noted below, here for follow-up of fatty liver and liver mass.     Fatty liver first noted on abd US in 2019.    His transaminases have been elevated since 7/2018, ALT>AST (max 125). Serologic workup has been negative for other etiologies of liver disease. Liver enzymes have previously normalized with good control of fatty liver risk factors.      Fibrosis staging:   Fibroscan 7/2019 = F0-F1, S3  Fibroscan 7/2020 = F0-F1, S2  Fibroscan 8/2022 = F0-F1 (kPa 5.1), S3     We have also been following an indeterminate liver mass, first noted on imaging since 3/2019.      Follow-up imaging:  -TPCT 3/2019 - 1.8 cm nodular in R lobe, indeterminate  -MRI at outside facility ([facility]) - 1.8 cm lesion in R lobe, ? cavernous hemangioma though still indeterminate  -MRI 1/2020 - nonspecific 2.5 cm lesion in R hepatic lobe. No concerning features to suggest HCC.   *AFP normal    Previously reviewed at IR conference-- stable 2.5 cm lesion, recommendation for surveillance with US.    Abd US 2/2022- stable 2.2 cm liver lesion  Abd US 2/2023- stable 2.7 cm liver lesion. Reviewed by IR, recommended MRI w/ Eovist for possible FNH     Interval history:  Feels well today, no concerns.   Denies symptoms of hepatic decompensation including jaundice, ascites, cognitive problems to suggest hepatic encephalopathy, or GI bleeding.     MRI w/ Eovist 8/7/23- no liver masses or abnormal areas of enhancement     Was off some of his meds briefly, has restarted these.      Updates on risk factors for fatty liver:  Weight -- Body mass index is 28.45 kg/m².  Weight relatively stable  Dyslipidemia -- TG 400s  On statin                                Insulin resistance / diabetes -- HgbA1c up to 9  Alcohol use -- very minimal (h/o heavy alcohol use)    With higher blood  "sugar, ALT slightly up (66).         Past medical history, surgical history, problem list, family history, social history, allergies: Reviewed and updated in the appropriate section of the electronic medical record.      Current Outpatient Medications   Medication Sig Dispense Refill    amlodipine-olmesartan (JAMES) 10-40 mg per tablet Take 1 tablet by mouth once daily. 90 tablet 3    atorvastatin (LIPITOR) 20 MG tablet Take 1 tablet (20 mg total) by mouth once daily. 90 tablet 3    BD ULTRA-FINE ORIG PEN NEEDLE 29 gauge x 1/2" Ndle       dapagliflozin propanediol (FARXIGA) 5 mg Tab tablet Take 1 tablet (5 mg total) by mouth once daily. 90 tablet 3    metFORMIN (GLUCOPHAGE) 1000 MG tablet Take 1 tablet (1,000 mg total) by mouth 2 (two) times daily with meals. 180 tablet 3    MICROLET LANCET Misc       TRUE METRIX GLUCOSE METER Misc USE AS DIRECTED      TRUE METRIX GLUCOSE TEST STRIP Strp        No current facility-administered medications for this visit.     Medication list reviewed and updated.      Review of Systems - as per HPI  Constitutional: Negative for fatigue or unexpected weight change.   Respiratory: Negative for shortness of breath.    Cardiovascular: Negative for leg swelling.  Gastrointestinal: Negative for abdominal distention or abdominal pain. Negative for melena or hematemesis.  Musculoskeletal: Negative for myalgias.    Skin: Negative for jaundice or itching.  Neurological: Negative for confusion or slowed mentation. Negative for tremors.   Hematological: Does not bruise/bleed easily.   Psychiatric: Negative for sleep disturbance.      Physical Exam   Constitutional: Well-nourished. No distress. Alert and oriented.  Eyes: No scleral icterus.   Pulmonary/Chest: Respiratory effort normal. No respiratory distress.   Abdominal: No distension, no ascites appreciated.   Extremities: No edema.   Neurological: No tremor.   Skin: No jaundice. No spider telangiectasias.  Psychiatric: Normal mood and affect. " Speech, behavior, and thought content normal.       LABS & DIAGNOSTIC STUDIES     I have personally reviewed pertinent laboratory findings:    Lab Results   Component Value Date    ALT 66 (H) 07/25/2023    AST 37 07/25/2023    ALKPHOS 77 07/25/2023    BILITOT 0.6 07/25/2023    ALBUMIN 3.8 07/25/2023    INR 0.9 04/19/2018       Lab Results   Component Value Date    WBC 8.20 07/25/2023    HGB 18.2 (H) 07/25/2023    HCT 55.8 (H) 07/25/2023    MCV 90 07/25/2023     07/25/2023       Lab Results   Component Value Date     07/25/2023    K 4.4 07/25/2023    BUN 15 07/25/2023    CREATININE 1.1 07/25/2023    ESTGFRAFRICA >60.0 03/14/2022    EGFRNONAA >60.0 03/14/2022       Lab Results   Component Value Date    SMOOTHMUSCAB Negative 1:40 06/12/2019    ANASCREEN Negative <1:160 06/12/2019    FERRITIN 434 (H) 05/22/2019    FESATURATED 23 05/22/2019    LIDYP6RIBXHY MM 07/03/2019    LRRAN4BNWAKJ 104 07/03/2019    CERULOPLSM 28.0 07/03/2019    HEPBSAG Negative 03/20/2019    HEPBIGM Negative 03/20/2019    HEPBCAB Negative 07/03/2019    HEPCAB Negative 03/20/2019    HEPAIGM Negative 03/20/2019    AVN14EYCV Negative 06/12/2019       Lab Results   Component Value Date    AFP 3.6 07/03/2019       I have personally reviewed the following result reports:  Abdominal US - 2/13/23  CT - 3/27/19  MRI - 8/7/23        ASSESSMENT & PLAN     47 y.o. male with:    1. NAFLD, elevated liver enzymes  -- ALT remains mildly elevated, anticipate improvement with better blood sugar/cholesterol now that he has restarted his meds   -- Recommend labs to monitor liver enzymes/LFTs 1-2 times per year with PCP  -- Fibroscans previously reassuring, no-minimal fibrosis (F0-F1), repeat next year     Reminded that the only treatment for fatty liver is weight loss, maintaining good control of metabolic risk factors (blood pressure, cholesterol, and blood sugar), and moderating alcohol use.      2. Body mass index is 28.45 kg/m²., HLD, DM  -- Reviewed  weight loss strategies including dietary changes. Recommend low carbohydrate/sugar, high protein/fiber diet. Recommend long-term weight loss goal of 10% (about 18 lb).     3. Liver lesion  -- Stable on imaging since 3/2019  -- No findings on CT or MRIs to suggest HCC, AFP WNL  -- MRI w/ Eovist this month did not show any liver masses. Will having imaging uploaded for IR review.       Orders Placed This Encounter   Procedures    FibroScan [place] (Vibration Controlled Transient Elastography)       *See AVS for patient education and instructions.      Return to clinic in 1 year with Fibroscan.      Thank you for allowing me to participate in the care of OLMAN Perez-C  Hepatology        Duration of encounter: 30 min  This includes face to face time and non-face to face time preparing to see the patient (eg, review of tests), obtaining and/or reviewing separately obtained history, documenting clinical information in the electronic or other health record, independently interpreting results and communicating results to the patient/family/caregiver, or care coordination.

## 2023-08-23 NOTE — PATIENT INSTRUCTIONS
Routine labs with PCP  Follow-up in 1 year with Fibroscan  Liver enzymes should improve with lower blood sugar/cholesterol

## 2023-08-24 ENCOUNTER — TELEPHONE (OUTPATIENT)
Dept: HEPATOLOGY | Facility: CLINIC | Age: 48
End: 2023-08-24
Payer: COMMERCIAL

## 2023-08-24 DIAGNOSIS — R16.0 LIVER MASS: Primary | ICD-10-CM

## 2023-09-26 ENCOUNTER — PATIENT OUTREACH (OUTPATIENT)
Dept: ADMINISTRATIVE | Facility: HOSPITAL | Age: 48
End: 2023-09-26
Payer: COMMERCIAL

## 2023-09-26 ENCOUNTER — TELEPHONE (OUTPATIENT)
Dept: INTERNAL MEDICINE | Facility: CLINIC | Age: 48
End: 2023-09-26
Payer: COMMERCIAL

## 2023-09-26 VITALS — DIASTOLIC BLOOD PRESSURE: 80 MMHG | SYSTOLIC BLOOD PRESSURE: 130 MMHG

## 2023-09-26 NOTE — PROGRESS NOTES
Health Maintenance Due   Topic Date Due    Colorectal Cancer Screening  Never done    COVID-19 Vaccine (6 - Moderna series) 04/21/2022    Foot Exam  03/14/2023    Eye Exam  03/14/2023    Influenza Vaccine (1) 09/01/2023    Hemoglobin A1c  10/25/2023      Chart reviewed. Triggered LINKS. Updated Care Everywhere. Pt has upcoming pcp appt.     Tanika Flores CMA  Population Health Care Coordinator  Primary Care Team

## 2023-10-23 DIAGNOSIS — R80.9 TYPE 2 DIABETES MELLITUS WITH MICROALBUMINURIA, WITHOUT LONG-TERM CURRENT USE OF INSULIN: ICD-10-CM

## 2023-10-23 DIAGNOSIS — E11.29 TYPE 2 DIABETES MELLITUS WITH MICROALBUMINURIA, WITHOUT LONG-TERM CURRENT USE OF INSULIN: ICD-10-CM

## 2023-10-23 RX ORDER — ATORVASTATIN CALCIUM 20 MG/1
20 TABLET, FILM COATED ORAL DAILY
Qty: 90 TABLET | Refills: 3 | Status: SHIPPED | OUTPATIENT
Start: 2023-10-23 | End: 2024-10-22

## 2023-10-23 NOTE — TELEPHONE ENCOUNTER
No care due was identified.  [facility] Embedded Care Due Messages. Reference number: [id number].   10/23/2023 3:11:28 AM CDT

## 2023-10-24 NOTE — TELEPHONE ENCOUNTER
Refill Decision Note   Mark Miguel  is requesting a refill authorization.  Brief Assessment and Rationale for Refill:  Approve     Medication Therapy Plan:       Medication Reconciliation Completed: No   Comments:     No Care Gaps recommended.     Note composed:7:50 PM 10/23/2023

## 2023-11-14 ENCOUNTER — OFFICE VISIT (OUTPATIENT)
Dept: INTERNAL MEDICINE | Facility: CLINIC | Age: 48
End: 2023-11-14
Payer: COMMERCIAL

## 2023-11-14 VITALS
BODY MASS INDEX: 24.94 KG/M2 | HEART RATE: 88 BPM | DIASTOLIC BLOOD PRESSURE: 86 MMHG | SYSTOLIC BLOOD PRESSURE: 126 MMHG | HEIGHT: 67 IN | WEIGHT: 158.94 LBS | OXYGEN SATURATION: 97 %

## 2023-11-14 DIAGNOSIS — Z12.11 COLON CANCER SCREENING: ICD-10-CM

## 2023-11-14 DIAGNOSIS — I10 HYPERTENSION, WELL CONTROLLED: ICD-10-CM

## 2023-11-14 DIAGNOSIS — R80.9 TYPE 2 DIABETES MELLITUS WITH MICROALBUMINURIA, WITHOUT LONG-TERM CURRENT USE OF INSULIN: Primary | ICD-10-CM

## 2023-11-14 DIAGNOSIS — E11.29 TYPE 2 DIABETES MELLITUS WITH MICROALBUMINURIA, WITHOUT LONG-TERM CURRENT USE OF INSULIN: Primary | ICD-10-CM

## 2023-11-14 PROCEDURE — 99214 PR OFFICE/OUTPT VISIT, EST, LEVL IV, 30-39 MIN: ICD-10-PCS | Mod: S$GLB,,, | Performed by: INTERNAL MEDICINE

## 2023-11-14 PROCEDURE — 4010F ACE/ARB THERAPY RXD/TAKEN: CPT | Mod: CPTII,S$GLB,, | Performed by: INTERNAL MEDICINE

## 2023-11-14 PROCEDURE — 99999 PR PBB SHADOW E&M-EST. PATIENT-LVL IV: ICD-10-PCS | Mod: PBBFAC,,, | Performed by: INTERNAL MEDICINE

## 2023-11-14 PROCEDURE — 99999 PR PBB SHADOW E&M-EST. PATIENT-LVL IV: CPT | Mod: PBBFAC,,, | Performed by: INTERNAL MEDICINE

## 2023-11-14 PROCEDURE — 3074F SYST BP LT 130 MM HG: CPT | Mod: CPTII,S$GLB,, | Performed by: INTERNAL MEDICINE

## 2023-11-14 PROCEDURE — 3079F DIAST BP 80-89 MM HG: CPT | Mod: CPTII,S$GLB,, | Performed by: INTERNAL MEDICINE

## 2023-11-14 PROCEDURE — 1159F PR MEDICATION LIST DOCUMENTED IN MEDICAL RECORD: ICD-10-PCS | Mod: CPTII,S$GLB,, | Performed by: INTERNAL MEDICINE

## 2023-11-14 PROCEDURE — 3008F BODY MASS INDEX DOCD: CPT | Mod: CPTII,S$GLB,, | Performed by: INTERNAL MEDICINE

## 2023-11-14 PROCEDURE — 99214 OFFICE O/P EST MOD 30 MIN: CPT | Mod: S$GLB,,, | Performed by: INTERNAL MEDICINE

## 2023-11-14 PROCEDURE — 4010F PR ACE/ARB THEARPY RXD/TAKEN: ICD-10-PCS | Mod: CPTII,S$GLB,, | Performed by: INTERNAL MEDICINE

## 2023-11-14 PROCEDURE — 3066F NEPHROPATHY DOC TX: CPT | Mod: CPTII,S$GLB,, | Performed by: INTERNAL MEDICINE

## 2023-11-14 PROCEDURE — 3062F PR POS MACROALBUMINURIA RESULT DOCUMENTED/REVIEW: ICD-10-PCS | Mod: CPTII,S$GLB,, | Performed by: INTERNAL MEDICINE

## 2023-11-14 PROCEDURE — 3052F HG A1C>EQUAL 8.0%<EQUAL 9.0%: CPT | Mod: CPTII,S$GLB,, | Performed by: INTERNAL MEDICINE

## 2023-11-14 PROCEDURE — 3066F PR DOCUMENTATION OF TREATMENT FOR NEPHROPATHY: ICD-10-PCS | Mod: CPTII,S$GLB,, | Performed by: INTERNAL MEDICINE

## 2023-11-14 PROCEDURE — 3074F PR MOST RECENT SYSTOLIC BLOOD PRESSURE < 130 MM HG: ICD-10-PCS | Mod: CPTII,S$GLB,, | Performed by: INTERNAL MEDICINE

## 2023-11-14 PROCEDURE — 3062F POS MACROALBUMINURIA REV: CPT | Mod: CPTII,S$GLB,, | Performed by: INTERNAL MEDICINE

## 2023-11-14 PROCEDURE — 1159F MED LIST DOCD IN RCRD: CPT | Mod: CPTII,S$GLB,, | Performed by: INTERNAL MEDICINE

## 2023-11-14 PROCEDURE — 3052F PR MOST RECENT HEMOGLOBIN A1C LEVEL 8.0 - < 9.0%: ICD-10-PCS | Mod: CPTII,S$GLB,, | Performed by: INTERNAL MEDICINE

## 2023-11-14 PROCEDURE — 3079F PR MOST RECENT DIASTOLIC BLOOD PRESSURE 80-89 MM HG: ICD-10-PCS | Mod: CPTII,S$GLB,, | Performed by: INTERNAL MEDICINE

## 2023-11-14 PROCEDURE — 3008F PR BODY MASS INDEX (BMI) DOCUMENTED: ICD-10-PCS | Mod: CPTII,S$GLB,, | Performed by: INTERNAL MEDICINE

## 2023-11-14 NOTE — PROGRESS NOTES
"    CHIEF COMPLAINT     Chief Complaint   Patient presents with    Follow-up     6 mth       HPI     Mark Rivera is a 48 y.o. male dm2 HTN  here today for 6m follow up    Patient has lost 20 lbs intentionally.    For DM  Taking metformin 1000mg BID and farxiga 5mg daily  BP- take vamshi 10-40    Personally Reviewed Patient's Medical, surgical, family and social hx. Changes updated in [facility].  Care Team updated in Epic    Review of Systems:  Review of Systems    Health Maintenance:   Reviewed with patient  Due for the following:      PHYSICAL EXAM     /86 (BP Location: Right arm, Patient Position: Sitting, BP Method: Medium (Manual))   Pulse 88   Ht 5' 7" (1.702 m)   Wt 72.1 kg (158 lb 15.2 oz)   SpO2 97%   BMI 24.90 kg/m²     Gen: Well Appearing, NAD  HEENT: PERR, EOMI  Neck: FROM, no thyromegaly, no cervical adenopathy  CVD: RRR, no M/R/G  Pulm: Normal work of breathing, CTAB, no wheezing  Abd:  Soft, NT, ND non TTP, no mass  MSK: no LE edema  Neuro: A&Ox3, gait normal, speech normal  Mood; Mood normal, behavior normal, thought process linear   Diabetic Foot exam  Skin intact 2+ pedal pulse, no skin breakdown or ulcers between toes, proprioception and monofilament test normal.      LABS     Labs reviewed; ordered    ASSESSMENT     1. Type 2 diabetes mellitus with microalbuminuria, without long-term current use of insulin  Ambulatory referral/consult to Optometry      2. Hypertension, well controlled        3. Colon cancer screening  Fecal Immunochemical Test (iFOBT)              Plan     Mark Rivera is a 48 y.o. male with  1. Type 2 diabetes mellitus with microalbuminuria, without long-term current use of insulin  Recheck A1c  Continue current regimen.  - Ambulatory referral/consult to Optometry; Future    2. Hypertension, well controlled  At goal continue vamshi 10-40    3. Colon cancer screening  - Fecal Immunochemical Test (iFOBT); Future      Yonas Najera MD      "

## 2023-11-28 ENCOUNTER — LAB VISIT (OUTPATIENT)
Dept: LAB | Facility: HOSPITAL | Age: 48
End: 2023-11-28
Attending: INTERNAL MEDICINE
Payer: COMMERCIAL

## 2023-11-28 DIAGNOSIS — Z12.11 COLON CANCER SCREENING: ICD-10-CM

## 2023-11-28 LAB — HEMOCCULT STL QL IA: NEGATIVE

## 2023-11-28 PROCEDURE — 82274 ASSAY TEST FOR BLOOD FECAL: CPT | Performed by: INTERNAL MEDICINE

## 2023-12-11 ENCOUNTER — TELEPHONE (OUTPATIENT)
Dept: HEPATOLOGY | Facility: CLINIC | Age: 48
End: 2023-12-11
Payer: COMMERCIAL

## 2023-12-11 DIAGNOSIS — K76.0 NAFLD (NONALCOHOLIC FATTY LIVER DISEASE): ICD-10-CM

## 2023-12-11 DIAGNOSIS — R16.0 LIVER MASS: Primary | ICD-10-CM

## 2024-01-23 ENCOUNTER — PATIENT MESSAGE (OUTPATIENT)
Dept: ADMINISTRATIVE | Facility: HOSPITAL | Age: 49
End: 2024-01-23
Payer: COMMERCIAL

## 2024-03-10 DIAGNOSIS — R80.9 TYPE 2 DIABETES MELLITUS WITH MICROALBUMINURIA, WITHOUT LONG-TERM CURRENT USE OF INSULIN: ICD-10-CM

## 2024-03-10 DIAGNOSIS — E11.29 TYPE 2 DIABETES MELLITUS WITH MICROALBUMINURIA, WITHOUT LONG-TERM CURRENT USE OF INSULIN: ICD-10-CM

## 2024-03-10 DIAGNOSIS — I10 HYPERTENSION, UNCONTROLLED: ICD-10-CM

## 2024-03-11 RX ORDER — AMLODIPINE AND OLMESARTAN MEDOXOMIL 10; 40 MG/1; MG/1
1 TABLET ORAL DAILY
Qty: 90 TABLET | Refills: 3 | Status: SHIPPED | OUTPATIENT
Start: 2024-03-11 | End: 2025-03-11

## 2024-03-11 RX ORDER — DAPAGLIFLOZIN 5 MG/1
5 TABLET, FILM COATED ORAL DAILY
Qty: 90 TABLET | Refills: 1 | Status: SHIPPED | OUTPATIENT
Start: 2024-03-11

## 2024-03-11 NOTE — TELEPHONE ENCOUNTER
Care Due:                  Date            Visit Type   Department     Provider  --------------------------------------------------------------------------------                                EP -                              PRIMARY      [facility] INTERNAL  Last Visit: 11-      CARE ([facility])   MEDICINE       TRINITY BETTS                              EP -                              PRIMARY      [facility] INTERNAL  Next Visit: 05-      CARE ([facility])   MEDICINE       TRINITY BETTS                                                            Last  Test          Frequency    Reason                     Performed    Due Date  --------------------------------------------------------------------------------    HBA1C.......  6 months...  dapagliflozin, metFORMIN.  07- 01-    [facility] Embedded Care Due Messages. Reference number: [id number].   3/10/2024 10:54:09 PM CDT

## 2024-03-19 ENCOUNTER — OFFICE VISIT (OUTPATIENT)
Dept: OPTOMETRY | Facility: CLINIC | Age: 49
End: 2024-03-19
Payer: COMMERCIAL

## 2024-03-19 DIAGNOSIS — R80.9 TYPE 2 DIABETES MELLITUS WITH MICROALBUMINURIA, WITHOUT LONG-TERM CURRENT USE OF INSULIN: ICD-10-CM

## 2024-03-19 DIAGNOSIS — E11.9 TYPE 2 DIABETES MELLITUS WITHOUT RETINOPATHY: Primary | ICD-10-CM

## 2024-03-19 DIAGNOSIS — H25.13 NUCLEAR SCLEROSIS OF BOTH EYES: ICD-10-CM

## 2024-03-19 DIAGNOSIS — H40.1134 PRIMARY OPEN ANGLE GLAUCOMA (POAG) OF BOTH EYES, INDETERMINATE STAGE: ICD-10-CM

## 2024-03-19 DIAGNOSIS — H52.7 REFRACTIVE ERROR: ICD-10-CM

## 2024-03-19 DIAGNOSIS — E11.29 TYPE 2 DIABETES MELLITUS WITH MICROALBUMINURIA, WITHOUT LONG-TERM CURRENT USE OF INSULIN: ICD-10-CM

## 2024-03-19 PROCEDURE — 4010F ACE/ARB THERAPY RXD/TAKEN: CPT | Mod: CPTII,S$GLB,, | Performed by: OPTOMETRIST

## 2024-03-19 PROCEDURE — 92015 DETERMINE REFRACTIVE STATE: CPT | Mod: S$GLB,,, | Performed by: OPTOMETRIST

## 2024-03-19 PROCEDURE — 99999 PR PBB SHADOW E&M-EST. PATIENT-LVL III: CPT | Mod: PBBFAC,,, | Performed by: OPTOMETRIST

## 2024-03-19 PROCEDURE — 2023F DILAT RTA XM W/O RTNOPTHY: CPT | Mod: CPTII,S$GLB,, | Performed by: OPTOMETRIST

## 2024-03-19 PROCEDURE — 1159F MED LIST DOCD IN RCRD: CPT | Mod: CPTII,S$GLB,, | Performed by: OPTOMETRIST

## 2024-03-19 PROCEDURE — 92004 COMPRE OPH EXAM NEW PT 1/>: CPT | Mod: S$GLB,,, | Performed by: OPTOMETRIST

## 2024-03-19 NOTE — PROGRESS NOTES
HPI    CC: Diabetic eye exam. Patient states his vision is cloudy and blurry.   Patient did not check BS this morning.    SERENA: 3 years ago    (+) Changes in vision   (-) Pain  (-) Irritation   (-) Itching   (-) Flashes  (-) Floaters  (-) Glasses wearer  (-) CL wearer  (-) Uses eye gtts    Does patient want a refraction today? yes    (-) Eye injury  (+) Eye surgery, accident OS when 15-16 years old   (-)POHx  (-)FOHx    (+)DM  Hemoglobin A1C       Date                     Value               Ref Range             Status                07/25/2023               9.0 (H)             4.0 - 5.6 %           Final                  03/14/2022               6.0 (H)             4.0 - 5.6 %           Final                  01/21/2021               5.8 (H)             4.0 - 5.6 %           Final                 Last edited by Melba Cisse, OD on 3/19/2024  8:43 AM.            Assessment /Plan     For exam results, see Encounter Report.    Type 2 diabetes mellitus without retinopathy    Type 2 diabetes mellitus with microalbuminuria, without long-term current use of insulin  -     Ambulatory referral/consult to Optometry    Primary open angle glaucoma (POAG) of both eyes, indeterminate stage    Nuclear sclerosis of both eyes    Refractive error      1-2. No retinopathy noted, OU. Discussed high A1C and risk for LOV/blindness if BS continues to be uncontrolled. Recommend screening eyes separately at home. If distortion or changes in vision noted, RTC ASAP. Monitor 1 year unless changes noted sooner.      3. Educated pt on findings. Large c/d OS>OD. Thin rim OU. Low IOP OS. (-)Fhx. Refer to Dr. Irvin for further eval.     4. Educated pt on findings. Not visually significant. No need for removal at this time. Monitor yearly.      5. Updated SRx. Monitor yearly.        RTC with Dr. Irvin as directed, me prn.

## 2024-04-17 ENCOUNTER — TELEPHONE (OUTPATIENT)
Dept: PHARMACY | Facility: CLINIC | Age: 49
End: 2024-04-17
Payer: COMMERCIAL

## 2024-05-14 ENCOUNTER — PATIENT MESSAGE (OUTPATIENT)
Dept: INTERNAL MEDICINE | Facility: CLINIC | Age: 49
End: 2024-05-14

## 2024-05-14 ENCOUNTER — LAB VISIT (OUTPATIENT)
Dept: LAB | Facility: HOSPITAL | Age: 49
End: 2024-05-14
Attending: INTERNAL MEDICINE
Payer: COMMERCIAL

## 2024-05-14 ENCOUNTER — OFFICE VISIT (OUTPATIENT)
Dept: INTERNAL MEDICINE | Facility: CLINIC | Age: 49
End: 2024-05-14
Payer: COMMERCIAL

## 2024-05-14 VITALS
HEART RATE: 58 BPM | DIASTOLIC BLOOD PRESSURE: 74 MMHG | SYSTOLIC BLOOD PRESSURE: 138 MMHG | OXYGEN SATURATION: 97 % | BODY MASS INDEX: 29.07 KG/M2 | WEIGHT: 185.19 LBS | HEIGHT: 67 IN

## 2024-05-14 DIAGNOSIS — I10 HYPERTENSION, WELL CONTROLLED: ICD-10-CM

## 2024-05-14 DIAGNOSIS — Z00.00 ANNUAL PHYSICAL EXAM: ICD-10-CM

## 2024-05-14 DIAGNOSIS — R80.9 TYPE 2 DIABETES MELLITUS WITH MICROALBUMINURIA, WITHOUT LONG-TERM CURRENT USE OF INSULIN: Primary | ICD-10-CM

## 2024-05-14 DIAGNOSIS — E11.29 TYPE 2 DIABETES MELLITUS WITH MICROALBUMINURIA, WITHOUT LONG-TERM CURRENT USE OF INSULIN: Primary | ICD-10-CM

## 2024-05-14 DIAGNOSIS — R80.9 TYPE 2 DIABETES MELLITUS WITH MICROALBUMINURIA, WITHOUT LONG-TERM CURRENT USE OF INSULIN: ICD-10-CM

## 2024-05-14 DIAGNOSIS — E78.5 HYPERLIPIDEMIA ASSOCIATED WITH TYPE 2 DIABETES MELLITUS: ICD-10-CM

## 2024-05-14 DIAGNOSIS — Z00.00 ANNUAL PHYSICAL EXAM: Primary | ICD-10-CM

## 2024-05-14 DIAGNOSIS — E11.69 HYPERLIPIDEMIA ASSOCIATED WITH TYPE 2 DIABETES MELLITUS: ICD-10-CM

## 2024-05-14 DIAGNOSIS — E11.29 TYPE 2 DIABETES MELLITUS WITH MICROALBUMINURIA, WITHOUT LONG-TERM CURRENT USE OF INSULIN: ICD-10-CM

## 2024-05-14 LAB
ALBUMIN SERPL BCP-MCNC: 3.9 G/DL (ref 3.5–5.2)
ALP SERPL-CCNC: 90 U/L (ref 55–135)
ALT SERPL W/O P-5'-P-CCNC: 49 U/L (ref 10–44)
ANION GAP SERPL CALC-SCNC: 10 MMOL/L (ref 8–16)
AST SERPL-CCNC: 33 U/L (ref 10–40)
BASOPHILS # BLD AUTO: 0.09 K/UL (ref 0–0.2)
BASOPHILS NFR BLD: 1.2 % (ref 0–1.9)
BILIRUB SERPL-MCNC: 0.6 MG/DL (ref 0.1–1)
BUN SERPL-MCNC: 21 MG/DL (ref 6–20)
CALCIUM SERPL-MCNC: 9.4 MG/DL (ref 8.7–10.5)
CHLORIDE SERPL-SCNC: 107 MMOL/L (ref 95–110)
CHOLEST SERPL-MCNC: 193 MG/DL (ref 120–199)
CHOLEST/HDLC SERPL: 4.8 {RATIO} (ref 2–5)
CO2 SERPL-SCNC: 21 MMOL/L (ref 23–29)
CREAT SERPL-MCNC: 1.2 MG/DL (ref 0.5–1.4)
DIFFERENTIAL METHOD BLD: NORMAL
EOSINOPHIL # BLD AUTO: 0.3 K/UL (ref 0–0.5)
EOSINOPHIL NFR BLD: 4.3 % (ref 0–8)
ERYTHROCYTE [DISTWIDTH] IN BLOOD BY AUTOMATED COUNT: 13.2 % (ref 11.5–14.5)
EST. GFR  (NO RACE VARIABLE): >60 ML/MIN/1.73 M^2
ESTIMATED AVG GLUCOSE: 189 MG/DL (ref 68–131)
GLUCOSE SERPL-MCNC: 178 MG/DL (ref 70–110)
HBA1C MFR BLD: 8.2 % (ref 4–5.6)
HCT VFR BLD AUTO: 51.6 % (ref 40–54)
HDLC SERPL-MCNC: 40 MG/DL (ref 40–75)
HDLC SERPL: 20.7 % (ref 20–50)
HGB BLD-MCNC: 16.6 G/DL (ref 14–18)
IMM GRANULOCYTES # BLD AUTO: 0.04 K/UL (ref 0–0.04)
IMM GRANULOCYTES NFR BLD AUTO: 0.5 % (ref 0–0.5)
LDLC SERPL CALC-MCNC: 78.2 MG/DL (ref 63–159)
LYMPHOCYTES # BLD AUTO: 2.4 K/UL (ref 1–4.8)
LYMPHOCYTES NFR BLD: 31.7 % (ref 18–48)
MCH RBC QN AUTO: 29.2 PG (ref 27–31)
MCHC RBC AUTO-ENTMCNC: 32.2 G/DL (ref 32–36)
MCV RBC AUTO: 91 FL (ref 82–98)
MONOCYTES # BLD AUTO: 0.7 K/UL (ref 0.3–1)
MONOCYTES NFR BLD: 8.7 % (ref 4–15)
NEUTROPHILS # BLD AUTO: 4.1 K/UL (ref 1.8–7.7)
NEUTROPHILS NFR BLD: 53.6 % (ref 38–73)
NONHDLC SERPL-MCNC: 153 MG/DL
NRBC BLD-RTO: 0 /100 WBC
PLATELET # BLD AUTO: 241 K/UL (ref 150–450)
PMV BLD AUTO: 12.5 FL (ref 9.2–12.9)
POTASSIUM SERPL-SCNC: 4.1 MMOL/L (ref 3.5–5.1)
PROT SERPL-MCNC: 7.5 G/DL (ref 6–8.4)
RBC # BLD AUTO: 5.68 M/UL (ref 4.6–6.2)
SODIUM SERPL-SCNC: 138 MMOL/L (ref 136–145)
TRIGL SERPL-MCNC: 374 MG/DL (ref 30–150)
WBC # BLD AUTO: 7.61 K/UL (ref 3.9–12.7)

## 2024-05-14 PROCEDURE — 3008F BODY MASS INDEX DOCD: CPT | Mod: CPTII,S$GLB,, | Performed by: INTERNAL MEDICINE

## 2024-05-14 PROCEDURE — 1159F MED LIST DOCD IN RCRD: CPT | Mod: CPTII,S$GLB,, | Performed by: INTERNAL MEDICINE

## 2024-05-14 PROCEDURE — 83036 HEMOGLOBIN GLYCOSYLATED A1C: CPT | Performed by: INTERNAL MEDICINE

## 2024-05-14 PROCEDURE — 3078F DIAST BP <80 MM HG: CPT | Mod: CPTII,S$GLB,, | Performed by: INTERNAL MEDICINE

## 2024-05-14 PROCEDURE — 4010F ACE/ARB THERAPY RXD/TAKEN: CPT | Mod: CPTII,S$GLB,, | Performed by: INTERNAL MEDICINE

## 2024-05-14 PROCEDURE — 80061 LIPID PANEL: CPT | Performed by: INTERNAL MEDICINE

## 2024-05-14 PROCEDURE — 99999 PR PBB SHADOW E&M-EST. PATIENT-LVL IV: CPT | Mod: PBBFAC,,, | Performed by: INTERNAL MEDICINE

## 2024-05-14 PROCEDURE — 36415 COLL VENOUS BLD VENIPUNCTURE: CPT | Performed by: INTERNAL MEDICINE

## 2024-05-14 PROCEDURE — 3075F SYST BP GE 130 - 139MM HG: CPT | Mod: CPTII,S$GLB,, | Performed by: INTERNAL MEDICINE

## 2024-05-14 PROCEDURE — 85025 COMPLETE CBC W/AUTO DIFF WBC: CPT | Performed by: INTERNAL MEDICINE

## 2024-05-14 PROCEDURE — 80053 COMPREHEN METABOLIC PANEL: CPT | Performed by: INTERNAL MEDICINE

## 2024-05-14 PROCEDURE — 99396 PREV VISIT EST AGE 40-64: CPT | Mod: S$GLB,,, | Performed by: INTERNAL MEDICINE

## 2024-05-14 RX ORDER — SEMAGLUTIDE 0.68 MG/ML
0.5 INJECTION, SOLUTION SUBCUTANEOUS
Qty: 4 EACH | Refills: 3 | Status: SHIPPED | OUTPATIENT
Start: 2024-05-14

## 2024-05-14 NOTE — PROGRESS NOTES
"    CHIEF COMPLAINT     Chief Complaint   Patient presents with    Follow-up       HPI     Mark Rivera is a 48 y.o. male Dm2, HTN and HLDhere today for annual    Taking   Metformin 1000mg BID and farxiga 5mg daily.       HTN  Taking vamshi 10-40    Plain to [place] in [place] for a month    Personally Reviewed Patient's Medical, surgical, family and social hx. Changes updated in [facility].  Care Team updated in Epic    Review of Systems:  Review of Systems   Endocrine: Negative for cold intolerance, heat intolerance, polyphagia and polyuria.       Health Maintenance:   Reviewed with patient  Due for the following:      PHYSICAL EXAM     /74   Pulse (!) 58   Ht 5' 7" (1.702 m)   Wt 84 kg (185 lb 3 oz)   SpO2 97%   BMI 29.00 kg/m²     Gen: Well Appearing, NAD  HEENT: PERR, EOMI  Neck: FROM, no thyromegaly, no cervical adenopathy  CVD: RRR, no M/R/G  Pulm: Normal work of breathing, CTAB, no wheezing  Abd:  Soft, NT, ND non TTP, no mass  MSK: no LE edema  Neuro: A&Ox3, gait normal, speech normal  Mood; Mood normal, behavior normal, thought process linear       LABS     Labs reviewed; ordered    ASSESSMENT     1. Annual physical exam  CBC Auto Differential    Comprehensive Metabolic Panel    Hemoglobin A1C    Lipid Panel      2. Type 2 diabetes mellitus with microalbuminuria, without long-term current use of insulin  Hemoglobin A1C      3. Hypertension, well controlled  CBC Auto Differential    Comprehensive Metabolic Panel      4. Hyperlipidemia associated with type 2 diabetes mellitus  Lipid Panel              Plan     Mark Rivera is a 48 y.o. male with type 2 diabetes hypertension hyperlipidemia  1. Annual physical exam  Updated problem list, medical history, care team and discussed HM.       2. Type 2 diabetes mellitus with microalbuminuria, without long-term current use of insulin  Continue Farxiga and metformin  Would add G LP 1 agonist as next agent if indicated  - Hemoglobin A1C; Future    3. Hypertension, well " controlled  Controlled continue Adrienne 10-40  - CBC Auto Differential; Future  - Comprehensive Metabolic Panel; Future    4. Hyperlipidemia associated with type 2 diabetes mellitus  Continue atorvastatin 20  - Lipid Panel; Future      Yonas Najera MD

## 2024-05-15 ENCOUNTER — TELEPHONE (OUTPATIENT)
Dept: INTERNAL MEDICINE | Facility: CLINIC | Age: 49
End: 2024-05-15
Payer: COMMERCIAL

## 2024-05-15 NOTE — TELEPHONE ENCOUNTER
Portal message sent.  Would like to start ozempic .5mg weekly to see if we can get A1c back to goal.     Yonas Najera

## 2024-06-10 ENCOUNTER — PATIENT MESSAGE (OUTPATIENT)
Dept: INTERNAL MEDICINE | Facility: CLINIC | Age: 49
End: 2024-06-10
Payer: COMMERCIAL

## 2024-07-12 DIAGNOSIS — E11.29 TYPE 2 DIABETES MELLITUS WITH MICROALBUMINURIA, WITHOUT LONG-TERM CURRENT USE OF INSULIN: ICD-10-CM

## 2024-07-12 DIAGNOSIS — I10 HYPERTENSION, UNCONTROLLED: ICD-10-CM

## 2024-07-12 DIAGNOSIS — R80.9 TYPE 2 DIABETES MELLITUS WITH MICROALBUMINURIA, WITHOUT LONG-TERM CURRENT USE OF INSULIN: ICD-10-CM

## 2024-07-12 RX ORDER — DAPAGLIFLOZIN 5 MG/1
5 TABLET, FILM COATED ORAL DAILY
Qty: 90 TABLET | Refills: 1 | Status: SHIPPED | OUTPATIENT
Start: 2024-07-12

## 2024-07-12 NOTE — TELEPHONE ENCOUNTER
Refill Routing Note   Medication(s) are not appropriate for processing by [facility] for the following reason(s):        Drug-disease interaction    [facility] action(s):  Defer  Approve        Medication Therapy Plan: amlodipine-olmesartan and Liver mass      Appointments  past 12m or future 3m with PCP    Date Provider   Last Visit   5/14/2024 Yonas Najera MD   Next Visit   Visit date not found Yonas Najera MD   ED visits in past 90 days: 0        Note composed:6:27 PM 07/12/2024

## 2024-07-15 RX ORDER — AMLODIPINE AND OLMESARTAN MEDOXOMIL 10; 40 MG/1; MG/1
1 TABLET ORAL DAILY
Qty: 90 TABLET | Refills: 3 | Status: SHIPPED | OUTPATIENT
Start: 2024-07-15

## 2024-07-19 ENCOUNTER — TELEPHONE (OUTPATIENT)
Dept: HEPATOLOGY | Facility: CLINIC | Age: 49
End: 2024-07-19
Payer: COMMERCIAL

## 2024-07-19 NOTE — TELEPHONE ENCOUNTER
Pt was called to Rs 8/15 appt due to provider relocating to [place]. No answer. Voice mail was  left and letter mailed to patient.    Altagracia

## 2024-07-31 DIAGNOSIS — E11.9 TYPE 2 DIABETES MELLITUS WITHOUT COMPLICATION: ICD-10-CM

## 2024-08-08 ENCOUNTER — PATIENT MESSAGE (OUTPATIENT)
Dept: HEPATOLOGY | Facility: CLINIC | Age: 49
End: 2024-08-08

## 2024-08-08 ENCOUNTER — PROCEDURE VISIT (OUTPATIENT)
Dept: HEPATOLOGY | Facility: CLINIC | Age: 49
End: 2024-08-08
Payer: COMMERCIAL

## 2024-08-08 ENCOUNTER — HOSPITAL ENCOUNTER (OUTPATIENT)
Dept: RADIOLOGY | Facility: HOSPITAL | Age: 49
Discharge: HOME | End: 2024-08-08
Attending: NURSE PRACTITIONER
Payer: COMMERCIAL

## 2024-08-08 DIAGNOSIS — K76.0 NAFLD (NONALCOHOLIC FATTY LIVER DISEASE): ICD-10-CM

## 2024-08-08 DIAGNOSIS — R16.0 LIVER MASS: ICD-10-CM

## 2024-08-08 PROCEDURE — 76705 ECHO EXAM OF ABDOMEN: CPT | Mod: TC

## 2024-08-08 PROCEDURE — 76705 ECHO EXAM OF ABDOMEN: CPT | Mod: 26,,, | Performed by: RADIOLOGY

## 2024-09-10 DIAGNOSIS — K76.0 METABOLIC DYSFUNCTION-ASSOCIATED STEATOTIC LIVER DISEASE (MASLD): Primary | ICD-10-CM

## 2024-09-10 DIAGNOSIS — R16.0 LIVER MASS: ICD-10-CM

## 2024-11-12 ENCOUNTER — PATIENT MESSAGE (OUTPATIENT)
Dept: ADMINISTRATIVE | Facility: HOSPITAL | Age: 49
End: 2024-11-12
Payer: COMMERCIAL

## 2024-12-02 ENCOUNTER — TELEPHONE (OUTPATIENT)
Dept: PHARMACY | Facility: CLINIC | Age: 49
End: 2024-12-02
Payer: COMMERCIAL

## 2024-12-03 NOTE — TELEPHONE ENCOUNTER
[facility]/Population Health Chart Review & Patient Outreach Details For Medication Adherence Project    Reason for Outreach Encounter: 3rd Party payor non-compliance report ([employer], BC[employer], C, etc)  2.  Patient Outreach Method: Reviewed Patient Chart  3.   Medication in question: james    LAST FILLED: 10/16/24 for 90 day supply  Hypertension Medications               amlodipine-olmesartan (JAMES) 10-40 mg per tablet Take 1 tablet by mouth once daily.              4.  Reviewed and or Updates Made To: Patient Chart  5. Outreach Outcomes and/or actions taken: Patient filled medication and is on track to be adherent

## 2025-01-31 ENCOUNTER — TELEPHONE (OUTPATIENT)
Dept: PHARMACY | Facility: CLINIC | Age: 50
End: 2025-01-31
Payer: COMMERCIAL

## 2025-02-01 NOTE — TELEPHONE ENCOUNTER
[facility]/Population Health Chart Review & Patient Outreach Details For Medication Adherence Project    Reason for Outreach Encounter: 3rd Party payor non-compliance report ([employer], BCBS, C, etc)  2.  Patient Outreach Method: [employer] message  3.   Medication in question: atorvastatin   LAST FILLED: 7/14/24 for 90 day supply  Hyperlipidemia Medications               atorvastatin (LIPITOR) 20 MG tablet Take 1 tablet (20 mg total) by mouth once daily.               4.  Reviewed and or Updates Made To: Patient Chart  5. Outreach Outcomes and/or actions taken: Sent inquiry to patient: Waiting for response.

## 2025-02-11 ENCOUNTER — PATIENT MESSAGE (OUTPATIENT)
Dept: ADMINISTRATIVE | Facility: HOSPITAL | Age: 50
End: 2025-02-11
Payer: COMMERCIAL

## 2025-03-18 ENCOUNTER — LAB VISIT (OUTPATIENT)
Dept: LAB | Facility: HOSPITAL | Age: 50
End: 2025-03-18
Attending: INTERNAL MEDICINE
Payer: COMMERCIAL

## 2025-03-18 ENCOUNTER — OFFICE VISIT (OUTPATIENT)
Dept: INTERNAL MEDICINE | Facility: CLINIC | Age: 50
End: 2025-03-18
Payer: COMMERCIAL

## 2025-03-18 VITALS
HEART RATE: 94 BPM | WEIGHT: 167.13 LBS | DIASTOLIC BLOOD PRESSURE: 82 MMHG | HEIGHT: 67 IN | BODY MASS INDEX: 26.23 KG/M2 | OXYGEN SATURATION: 97 % | SYSTOLIC BLOOD PRESSURE: 136 MMHG

## 2025-03-18 DIAGNOSIS — Z00.00 ANNUAL PHYSICAL EXAM: ICD-10-CM

## 2025-03-18 DIAGNOSIS — E11.69 HYPERLIPIDEMIA ASSOCIATED WITH TYPE 2 DIABETES MELLITUS: ICD-10-CM

## 2025-03-18 DIAGNOSIS — E78.5 HYPERLIPIDEMIA ASSOCIATED WITH TYPE 2 DIABETES MELLITUS: ICD-10-CM

## 2025-03-18 DIAGNOSIS — R80.9 TYPE 2 DIABETES MELLITUS WITH MICROALBUMINURIA, WITHOUT LONG-TERM CURRENT USE OF INSULIN: Primary | ICD-10-CM

## 2025-03-18 DIAGNOSIS — I10 HYPERTENSION, WELL CONTROLLED: ICD-10-CM

## 2025-03-18 DIAGNOSIS — E11.29 TYPE 2 DIABETES MELLITUS WITH MICROALBUMINURIA, WITHOUT LONG-TERM CURRENT USE OF INSULIN: Primary | ICD-10-CM

## 2025-03-18 LAB
ALBUMIN SERPL BCP-MCNC: 4 G/DL (ref 3.5–5.2)
ALP SERPL-CCNC: 93 U/L (ref 40–150)
ALT SERPL W/O P-5'-P-CCNC: 42 U/L (ref 10–44)
ANION GAP SERPL CALC-SCNC: 11 MMOL/L (ref 8–16)
AST SERPL-CCNC: 27 U/L (ref 10–40)
BASOPHILS # BLD AUTO: 0.1 K/UL (ref 0–0.2)
BASOPHILS NFR BLD: 1.2 % (ref 0–1.9)
BILIRUB SERPL-MCNC: 0.8 MG/DL (ref 0.1–1)
BUN SERPL-MCNC: 19 MG/DL (ref 6–20)
CALCIUM SERPL-MCNC: 9.9 MG/DL (ref 8.7–10.5)
CHLORIDE SERPL-SCNC: 104 MMOL/L (ref 95–110)
CHOLEST SERPL-MCNC: 197 MG/DL (ref 120–199)
CHOLEST/HDLC SERPL: 4.9 {RATIO} (ref 2–5)
CO2 SERPL-SCNC: 24 MMOL/L (ref 23–29)
CREAT SERPL-MCNC: 1.3 MG/DL (ref 0.5–1.4)
DIFFERENTIAL METHOD BLD: ABNORMAL
EOSINOPHIL # BLD AUTO: 0.2 K/UL (ref 0–0.5)
EOSINOPHIL NFR BLD: 2.2 % (ref 0–8)
ERYTHROCYTE [DISTWIDTH] IN BLOOD BY AUTOMATED COUNT: 12.8 % (ref 11.5–14.5)
EST. GFR  (NO RACE VARIABLE): >60 ML/MIN/1.73 M^2
ESTIMATED AVG GLUCOSE: 352 MG/DL (ref 68–131)
GLUCOSE SERPL-MCNC: 267 MG/DL (ref 70–110)
HBA1C MFR BLD: 13.9 % (ref 4–5.6)
HCT VFR BLD AUTO: 54.8 % (ref 40–54)
HDLC SERPL-MCNC: 40 MG/DL (ref 40–75)
HDLC SERPL: 20.3 % (ref 20–50)
HGB BLD-MCNC: 18.1 G/DL (ref 14–18)
IMM GRANULOCYTES # BLD AUTO: 0.04 K/UL (ref 0–0.04)
IMM GRANULOCYTES NFR BLD AUTO: 0.5 % (ref 0–0.5)
LDLC SERPL CALC-MCNC: ABNORMAL MG/DL (ref 63–159)
LYMPHOCYTES # BLD AUTO: 2.2 K/UL (ref 1–4.8)
LYMPHOCYTES NFR BLD: 27.1 % (ref 18–48)
MCH RBC QN AUTO: 29.3 PG (ref 27–31)
MCHC RBC AUTO-ENTMCNC: 33 G/DL (ref 32–36)
MCV RBC AUTO: 89 FL (ref 82–98)
MONOCYTES # BLD AUTO: 0.5 K/UL (ref 0.3–1)
MONOCYTES NFR BLD: 6.1 % (ref 4–15)
NEUTROPHILS # BLD AUTO: 5.1 K/UL (ref 1.8–7.7)
NEUTROPHILS NFR BLD: 62.9 % (ref 38–73)
NONHDLC SERPL-MCNC: 157 MG/DL
NRBC BLD-RTO: 0 /100 WBC
PLATELET # BLD AUTO: 257 K/UL (ref 150–450)
PMV BLD AUTO: 12.8 FL (ref 9.2–12.9)
POTASSIUM SERPL-SCNC: 4.1 MMOL/L (ref 3.5–5.1)
PROT SERPL-MCNC: 7.9 G/DL (ref 6–8.4)
RBC # BLD AUTO: 6.17 M/UL (ref 4.6–6.2)
SODIUM SERPL-SCNC: 139 MMOL/L (ref 136–145)
TRIGL SERPL-MCNC: 653 MG/DL (ref 30–150)
WBC # BLD AUTO: 8.18 K/UL (ref 3.9–12.7)

## 2025-03-18 PROCEDURE — 1159F MED LIST DOCD IN RCRD: CPT | Mod: CPTII,S$GLB,, | Performed by: INTERNAL MEDICINE

## 2025-03-18 PROCEDURE — 3072F LOW RISK FOR RETINOPATHY: CPT | Mod: CPTII,S$GLB,, | Performed by: INTERNAL MEDICINE

## 2025-03-18 PROCEDURE — 80061 LIPID PANEL: CPT | Performed by: INTERNAL MEDICINE

## 2025-03-18 PROCEDURE — 3008F BODY MASS INDEX DOCD: CPT | Mod: CPTII,S$GLB,, | Performed by: INTERNAL MEDICINE

## 2025-03-18 PROCEDURE — 85025 COMPLETE CBC W/AUTO DIFF WBC: CPT | Performed by: INTERNAL MEDICINE

## 2025-03-18 PROCEDURE — 3079F DIAST BP 80-89 MM HG: CPT | Mod: CPTII,S$GLB,, | Performed by: INTERNAL MEDICINE

## 2025-03-18 PROCEDURE — 3075F SYST BP GE 130 - 139MM HG: CPT | Mod: CPTII,S$GLB,, | Performed by: INTERNAL MEDICINE

## 2025-03-18 PROCEDURE — 99999 PR PBB SHADOW E&M-EST. PATIENT-LVL III: CPT | Mod: PBBFAC,,, | Performed by: INTERNAL MEDICINE

## 2025-03-18 PROCEDURE — 83036 HEMOGLOBIN GLYCOSYLATED A1C: CPT | Performed by: INTERNAL MEDICINE

## 2025-03-18 PROCEDURE — 1160F RVW MEDS BY RX/DR IN RCRD: CPT | Mod: CPTII,S$GLB,, | Performed by: INTERNAL MEDICINE

## 2025-03-18 PROCEDURE — 99396 PREV VISIT EST AGE 40-64: CPT | Mod: S$GLB,,, | Performed by: INTERNAL MEDICINE

## 2025-03-18 PROCEDURE — 80053 COMPREHEN METABOLIC PANEL: CPT | Performed by: INTERNAL MEDICINE

## 2025-03-18 RX ORDER — ATORVASTATIN CALCIUM 20 MG/1
20 TABLET, FILM COATED ORAL DAILY
Qty: 90 TABLET | Refills: 3 | Status: SHIPPED | OUTPATIENT
Start: 2025-03-18 | End: 2026-03-18

## 2025-03-18 RX ORDER — AMLODIPINE AND OLMESARTAN MEDOXOMIL 10; 40 MG/1; MG/1
1 TABLET ORAL DAILY
Qty: 90 TABLET | Refills: 3 | Status: SHIPPED | OUTPATIENT
Start: 2025-03-18

## 2025-03-18 RX ORDER — SEMAGLUTIDE 0.68 MG/ML
0.5 INJECTION, SOLUTION SUBCUTANEOUS
Qty: 4 EACH | Refills: 3 | Status: SHIPPED | OUTPATIENT
Start: 2025-03-18

## 2025-03-18 RX ORDER — METFORMIN HYDROCHLORIDE 1000 MG/1
1000 TABLET ORAL
Qty: 180 TABLET | Refills: 3 | Status: SHIPPED | OUTPATIENT
Start: 2025-03-18

## 2025-03-18 NOTE — PROGRESS NOTES
"    CHIEF COMPLAINT     Chief Complaint   Patient presents with    Follow-up     Diabetes        HPI     Mark Rivera is a 49 y.o. male diabetes hypertension hyperlipidemia here today for annual exam    Type 2 diabetes  Has been taking his metformin in his Farxiga.  Did not start Ozempic.  Reports nocturia 3 times    Hypertension  Taking James 10-40 pressure has been controlled    Hyperlipidemia taking atorvastatin 20    Personally Reviewed Patient's Medical, surgical, family and social hx. Changes updated in [facility].  Care Team updated in Epic    Review of Systems:  Review of Systems    Health Maintenance:   Reviewed with patient  Due for the following:      PHYSICAL EXAM     /82   Pulse 94   Ht 5' 7" (1.702 m)   Wt 75.8 kg (167 lb 1.7 oz)   SpO2 97%   BMI 26.17 kg/m²     Gen: Well Appearing, NAD  HEENT: PERR, EOMI  Neck: FROM, no thyromegaly, no cervical adenopathy  CVD: RRR, no M/R/G  Pulm: Normal work of breathing, CTAB, no wheezing  Abd:  Soft, NT, ND non TTP, no mass  MSK: no LE edema  Neuro: A&Ox3, gait normal, speech normal  Mood; Mood normal, behavior normal, thought process linear       LABS     Labs reviewed; Notable for    ASSESSMENT     1. Type 2 diabetes mellitus with microalbuminuria, without long-term current use of insulin  semaglutide (OZEMPIC) 0.25 mg or 0.5 mg (2 mg/3 mL) pen injector    metFORMIN (GLUCOPHAGE) 1000 MG tablet    atorvastatin (LIPITOR) 20 MG tablet    empagliflozin (JARDIANCE) 10 mg tablet    Microalbumin/Creatinine Ratio, Urine      2. Annual physical exam  CBC Auto Differential    Comprehensive Metabolic Panel    Hemoglobin A1C    Lipid Panel      3. Hyperlipidemia associated with type 2 diabetes mellitus  atorvastatin (LIPITOR) 20 MG tablet      4. Hypertension, well controlled  amlodipine-olmesartan (JAMES) 10-40 mg per tablet              Plan     Mark Rivera is a 49 y.o. male with dm2 HTN and HLD  1. Annual physical exam  Updated problem list, medical history, care team " and discussed HM.     - CBC Auto Differential; Future  - Comprehensive Metabolic Panel; Future  - Hemoglobin A1C; Future  - Lipid Panel; Future    2. Hyperlipidemia associated with type 2 diabetes mellitus  Recheck lipid panel target LDL less than 70  - atorvastatin (LIPITOR) 20 MG tablet; Take 1 tablet (20 mg total) by mouth once daily.  Dispense: 90 tablet; Refill: 3    3. Type 2 diabetes mellitus with microalbuminuria, without long-term current use of insulin  Will represcribed Ozempic  Continue metformin and Jardiance  Anticipate A1c being elevated  Will have him follow up with me in 3 months  - semaglutide (OZEMPIC) 0.25 mg or 0.5 mg (2 mg/3 mL) pen injector; Inject 0.5 mg into the skin every 7 days.  Dispense: 4 each; Refill: 3  - metFORMIN (GLUCOPHAGE) 1000 MG tablet; Take 1 tablet (1,000 mg total) by mouth daily with breakfast.  Dispense: 180 tablet; Refill: 3  - atorvastatin (LIPITOR) 20 MG tablet; Take 1 tablet (20 mg total) by mouth once daily.  Dispense: 90 tablet; Refill: 3  - empagliflozin (JARDIANCE) 10 mg tablet; Take 1 tablet (10 mg total) by mouth once daily.  Dispense: 90 tablet; Refill: 3  - Microalbumin/Creatinine Ratio, Urine; Future    4. Hypertension, well controlled  At goal continue current regimen  - amlodipine-olmesartan (JAMES) 10-40 mg per tablet; Take 1 tablet by mouth once daily.  Dispense: 90 tablet; Refill: 3      Yonas Najera MD

## 2025-03-26 ENCOUNTER — RESULTS FOLLOW-UP (OUTPATIENT)
Dept: INTERNAL MEDICINE | Facility: CLINIC | Age: 50
End: 2025-03-26

## 2025-03-28 ENCOUNTER — TELEPHONE (OUTPATIENT)
Dept: INTERNAL MEDICINE | Facility: CLINIC | Age: 50
End: 2025-03-28
Payer: COMMERCIAL

## 2025-03-28 NOTE — TELEPHONE ENCOUNTER
I called the  line [phone]. Shone is the  and her ID # [id number].  Pt did re-start the Ozempic and was informed of the elevated A1c level.

## 2025-03-28 NOTE — TELEPHONE ENCOUNTER
----- Message from Yonas Najera MD sent at 3/26/2025 10:47 AM CDT -----  A1c out of control  Can we confirm that he restarted the Ozempic since our visit?  ----- Message -----  From: Kavin, [employer] [facility]  Sent: 3/18/2025   6:54 PM CDT  To: Yonas Najera MD

## 2025-03-28 NOTE — TELEPHONE ENCOUNTER
----- Message from Yonas Najera MD sent at 3/26/2025 10:47 AM CDT -----  A1c out of control  Can we confirm that he restarted the Ozempic since our visit?  ----- Message -----  From: Kavin, [employer] [facility]  Sent: 3/18/2025   6:54 PM CDT  To: Yonas Najera MD

## 2025-04-03 ENCOUNTER — TELEPHONE (OUTPATIENT)
Dept: PHARMACY | Facility: CLINIC | Age: 50
End: 2025-04-03
Payer: COMMERCIAL

## 2025-04-03 NOTE — TELEPHONE ENCOUNTER
[facility]/Population Health Chart Review & Patient Outreach Details For Medication Adherence Project    Reason for Outreach Encounter: 3rd Party payor non-compliance report ([employer], BCBS, C, etc)  2.  Patient Outreach Method: Reviewed Patient Chart  3.   Medication in question: james    LAST FILLED: 3/19/25 for 90 day supply  Hypertension Medications              amlodipine-olmesartan (JAMES) 10-40 mg per tablet Take 1 tablet by mouth once daily.              4.  Reviewed and or Updates Made To: Patient Chart  5. Outreach Outcomes and/or actions taken: Patient filled medication and is on track to be adherent

## 2025-04-25 DIAGNOSIS — R80.9 TYPE 2 DIABETES MELLITUS WITH MICROALBUMINURIA, WITHOUT LONG-TERM CURRENT USE OF INSULIN: ICD-10-CM

## 2025-04-25 DIAGNOSIS — E11.29 TYPE 2 DIABETES MELLITUS WITH MICROALBUMINURIA, WITHOUT LONG-TERM CURRENT USE OF INSULIN: ICD-10-CM

## 2025-04-25 NOTE — TELEPHONE ENCOUNTER
No care due was identified.  [facility] Embedded Care Due Messages. Reference number: [id number].   4/25/2025 5:24:01 PM CDT

## 2025-04-28 RX ORDER — SEMAGLUTIDE 0.68 MG/ML
0.5 INJECTION, SOLUTION SUBCUTANEOUS
Qty: 4 EACH | Refills: 3 | Status: SHIPPED | OUTPATIENT
Start: 2025-04-28

## 2025-05-03 ENCOUNTER — PATIENT MESSAGE (OUTPATIENT)
Dept: INTERNAL MEDICINE | Facility: CLINIC | Age: 50
End: 2025-05-03
Payer: COMMERCIAL

## 2025-05-03 NOTE — TELEPHONE ENCOUNTER
Pt asking if he should continue the farxiga, chart shows this was 3/18/25. Pt states he will need refill if he is to continue med.     LOV with Yonas Najera MD , 3/18/2025

## 2025-05-05 DIAGNOSIS — E11.29 TYPE 2 DIABETES MELLITUS WITH MICROALBUMINURIA, WITHOUT LONG-TERM CURRENT USE OF INSULIN: ICD-10-CM

## 2025-05-05 DIAGNOSIS — R80.9 TYPE 2 DIABETES MELLITUS WITH MICROALBUMINURIA, WITHOUT LONG-TERM CURRENT USE OF INSULIN: ICD-10-CM

## 2025-05-21 ENCOUNTER — TELEPHONE (OUTPATIENT)
Dept: PHARMACY | Facility: CLINIC | Age: 50
End: 2025-05-21
Payer: COMMERCIAL

## 2025-05-21 NOTE — TELEPHONE ENCOUNTER
[facility]/Population Health Chart Review & Patient Outreach Details For Medication Adherence Project    Reason for Outreach Encounter: 3rd Party payor non-compliance report ([employer], BCBS, [employer], etc)  2.  Patient Outreach Method: Reviewed patient chart   3.   Medication in question:    Hyperlipidemia Medications              atorvastatin (LIPITOR) 20 MG tablet Take 1 tablet (20 mg total) by mouth once daily.                  LF 90 ds 3/18/25    4.  Reviewed and or Updates Made To: Patient Chart  5. Outreach Outcomes and/or actions taken: Patient filled medication and is on track to be adherent  Additional Notes:

## 2025-07-09 ENCOUNTER — TELEPHONE (OUTPATIENT)
Dept: PHARMACY | Facility: CLINIC | Age: 50
End: 2025-07-09
Payer: COMMERCIAL

## 2025-07-09 NOTE — TELEPHONE ENCOUNTER
[facility]/Population Health Chart Review & Patient Outreach Details For Medication Adherence Project    Reason for Outreach Encounter: 3rd Party payor non-compliance report ([employer], BCBS, C, etc)  2.  Patient Outreach Method: Reviewed Patient Chart  3.   Medication in question: metformin    LAST FILLED: 6/15/25 for 90 day supply  Diabetes Medications              empagliflozin (JARDIANCE) 10 mg tablet Take 1 tablet (10 mg total) by mouth once daily.    metFORMIN (GLUCOPHAGE) 1000 MG tablet Take 1 tablet (1,000 mg total) by mouth daily with breakfast.    semaglutide (OZEMPIC) 0.25 mg or 0.5 mg (2 mg/3 mL) pen injector Inject 0.5 mg into the skin every 7 days.              4.  Reviewed and or Updates Made To: Patient Chart  5. Outreach Outcomes and/or actions taken: Patient filled medication and is on track to be adherent

## 2025-07-24 ENCOUNTER — TELEPHONE (OUTPATIENT)
Dept: PHARMACY | Facility: CLINIC | Age: 50
End: 2025-07-24
Payer: COMMERCIAL

## 2025-07-24 NOTE — TELEPHONE ENCOUNTER
[facility]/Population Health Chart Review & Patient Outreach Details For Medication Adherence Project    Reason for Outreach Encounter: 3rd Party payor non-compliance report ([employer], BCBS, C, etc)  2.  Patient Outreach Method: Reviewed Patient Chart  3.   Medication in question: james    LAST FILLED: 6/17/25 for 90 day supply  Hypertension Medications              amlodipine-olmesartan (JAMES) 10-40 mg per tablet Take 1 tablet by mouth once daily.              4.  Reviewed and or Updates Made To: Patient Chart  5. Outreach Outcomes and/or actions taken: Patient filled medication and is on track to be adherent

## 2025-08-11 DIAGNOSIS — R80.9 TYPE 2 DIABETES MELLITUS WITH MICROALBUMINURIA, WITHOUT LONG-TERM CURRENT USE OF INSULIN: ICD-10-CM

## 2025-08-11 DIAGNOSIS — E11.29 TYPE 2 DIABETES MELLITUS WITH MICROALBUMINURIA, WITHOUT LONG-TERM CURRENT USE OF INSULIN: ICD-10-CM

## 2025-08-27 ENCOUNTER — TELEPHONE (OUTPATIENT)
Dept: PHARMACY | Facility: CLINIC | Age: 50
End: 2025-08-27
Payer: COMMERCIAL

## 2025-08-29 DIAGNOSIS — R80.9 TYPE 2 DIABETES MELLITUS WITH MICROALBUMINURIA, WITHOUT LONG-TERM CURRENT USE OF INSULIN: ICD-10-CM

## 2025-08-29 DIAGNOSIS — E11.29 TYPE 2 DIABETES MELLITUS WITH MICROALBUMINURIA, WITHOUT LONG-TERM CURRENT USE OF INSULIN: ICD-10-CM

## 2025-08-29 RX ORDER — SEMAGLUTIDE 0.68 MG/ML
0.5 INJECTION, SOLUTION SUBCUTANEOUS
Qty: 12 EACH | Refills: 0 | Status: SHIPPED | OUTPATIENT
Start: 2025-08-29